# Patient Record
Sex: FEMALE | URBAN - NONMETROPOLITAN AREA
[De-identification: names, ages, dates, MRNs, and addresses within clinical notes are randomized per-mention and may not be internally consistent; named-entity substitution may affect disease eponyms.]

---

## 2017-03-22 ENCOUNTER — NURSE TRIAGE (OUTPATIENT)
Dept: ADMINISTRATIVE | Age: 62
End: 2017-03-22

## 2023-11-14 ENCOUNTER — TELEPHONE (OUTPATIENT)
Dept: NEUROSURGERY | Age: 68
End: 2023-11-14

## 2024-01-12 ENCOUNTER — HOSPITAL ENCOUNTER (OUTPATIENT)
Dept: CT IMAGING | Age: 69
Discharge: HOME | End: 2024-01-12
Attending: RADIOLOGY

## 2024-01-12 ENCOUNTER — HOSPITAL ENCOUNTER (OUTPATIENT)
Dept: MRI IMAGING | Age: 69
Discharge: HOME | End: 2024-01-12

## 2024-01-12 DIAGNOSIS — Z00.6 EXAMINATION FOR NORMAL COMPARISON FOR CLINICAL RESEARCH: ICD-10-CM

## 2024-01-15 ENCOUNTER — OFFICE VISIT (OUTPATIENT)
Dept: NEUROSURGERY | Age: 69
End: 2024-01-15
Payer: COMMERCIAL

## 2024-01-15 VITALS
DIASTOLIC BLOOD PRESSURE: 70 MMHG | SYSTOLIC BLOOD PRESSURE: 116 MMHG | WEIGHT: 107.2 LBS | HEIGHT: 65 IN | BODY MASS INDEX: 17.86 KG/M2

## 2024-01-15 DIAGNOSIS — Z98.1 ADJACENT SEGMENT DISEASE OF LUMBAR SPINE WITH HISTORY OF FUSION PROCEDURE: Primary | ICD-10-CM

## 2024-01-15 DIAGNOSIS — M51.36 ADJACENT SEGMENT DISEASE OF LUMBAR SPINE WITH HISTORY OF FUSION PROCEDURE: Primary | ICD-10-CM

## 2024-01-15 DIAGNOSIS — M51.26 LUMBAR DISC HERNIATION: ICD-10-CM

## 2024-01-15 DIAGNOSIS — M43.17 SPONDYLOLISTHESIS OF LUMBOSACRAL REGION: ICD-10-CM

## 2024-01-15 PROCEDURE — 99204 OFFICE O/P NEW MOD 45 MIN: CPT | Performed by: PHYSICIAN ASSISTANT

## 2024-01-15 PROCEDURE — 1123F ACP DISCUSS/DSCN MKR DOCD: CPT | Performed by: PHYSICIAN ASSISTANT

## 2024-01-15 RX ORDER — GABAPENTIN 300 MG/1
900 CAPSULE ORAL NIGHTLY
COMMUNITY
Start: 2021-05-06

## 2024-01-15 RX ORDER — ROPINIROLE 0.25 MG/1
TABLET, FILM COATED ORAL
COMMUNITY
Start: 2021-09-16

## 2024-01-15 RX ORDER — ASCORBIC ACID 500 MG
500 TABLET ORAL DAILY
COMMUNITY

## 2024-01-15 RX ORDER — FOLIC ACID 1 MG/1
1 TABLET ORAL DAILY
COMMUNITY

## 2024-01-15 RX ORDER — VITAMIN B COMPLEX
1 CAPSULE ORAL DAILY
COMMUNITY

## 2024-01-15 RX ORDER — DIPHENOXYLATE HYDROCHLORIDE AND ATROPINE SULFATE 2.5; .025 MG/1; MG/1
1 TABLET ORAL DAILY
COMMUNITY

## 2024-01-15 ASSESSMENT — ENCOUNTER SYMPTOMS
BACK PAIN: 1
SHORTNESS OF BREATH: 0
APNEA: 0
CHEST TIGHTNESS: 0

## 2024-01-15 NOTE — PROGRESS NOTES
[facility] NEUROSURGERY DEPARTMENT  [address] [place] [address]  [address]  [place] [id number]  Dept: [phone]  Dept Fax: [phone]      Patient Name:  Alpa Marte  Visit Date:  1/15/2024    HPI:     Ms. Marte is a 68 y.o. female that presents today at [facility] for evaluation of the following: A referral for service for evaluation of symptoms consistent with lumbar spinal stenosis    Chief Complaint   Patient presents with    New Patient     Lumbar stenosis         HPI   Mrs. Marte is a pleasant, active 68-year-old female, an every day smoker tobacco and 1/2 pack day history who admits to regular alcohol use and has a medical history significant for restless leg syndrome and urinary incontinence with a surgical history that includes prior lumbar fusions in 1989 and again in 2003 performed at [facility] resulting in pedicle screw and garima instrumentation at lumbar 4 spanning to S1.  She presents today unaccompanied and ambulating without assistance with complaints of chronic worsening low back pain radiating primarily to the left lower extremity all the way to the foot including chronic weakness and chronic partial left foot drop.  She does not take any medication other than over-the-counter medications and treats with ice and heat and rest.  In the past she has been treated by pain specialist who provided some injection therapies (greater than 20 years prior) that provided only transient relief.  She is not currently being seen or evaluated by pain specialist and is not currently undergoing any formal physical therapy.  She does relate that the first attempt fusion, in her words, did not take necessitating a follow-up procedure.  The procedures were performed by 2 different surgeons, neither of which does she follow with currently.       Medications:    Current Outpatient Medications:     gabapentin (NEURONTIN) 300 MG capsule, Take 3 capsules by

## 2024-03-11 ENCOUNTER — HOSPITAL ENCOUNTER (OUTPATIENT)
Dept: GENERAL RADIOLOGY | Age: 69
Discharge: HOME | End: 2024-03-11
Attending: RADIOLOGY

## 2024-03-11 ENCOUNTER — TELEPHONE (OUTPATIENT)
Dept: NEUROSURGERY | Age: 69
End: 2024-03-11

## 2024-03-11 ENCOUNTER — HOSPITAL ENCOUNTER (OUTPATIENT)
Dept: CT IMAGING | Age: 69
Discharge: HOME | End: 2024-03-11
Attending: RADIOLOGY

## 2024-03-11 ENCOUNTER — OFFICE VISIT (OUTPATIENT)
Dept: NEUROSURGERY | Age: 69
End: 2024-03-11
Payer: COMMERCIAL

## 2024-03-11 VITALS
SYSTOLIC BLOOD PRESSURE: 115 MMHG | WEIGHT: 107.14 LBS | BODY MASS INDEX: 17.85 KG/M2 | HEART RATE: 77 BPM | DIASTOLIC BLOOD PRESSURE: 71 MMHG | HEIGHT: 65 IN

## 2024-03-11 DIAGNOSIS — Z01.818 PRE-OP TESTING: ICD-10-CM

## 2024-03-11 DIAGNOSIS — R52 PAIN: ICD-10-CM

## 2024-03-11 DIAGNOSIS — Z98.1 ADJACENT SEGMENT DISEASE OF LUMBAR SPINE WITH HISTORY OF FUSION PROCEDURE: Primary | ICD-10-CM

## 2024-03-11 DIAGNOSIS — M51.36 ADJACENT SEGMENT DISEASE OF LUMBAR SPINE WITH HISTORY OF FUSION PROCEDURE: Primary | ICD-10-CM

## 2024-03-11 DIAGNOSIS — M51.26 LUMBAR DISC HERNIATION: ICD-10-CM

## 2024-03-11 DIAGNOSIS — M43.17 SPONDYLOLISTHESIS OF LUMBOSACRAL REGION: ICD-10-CM

## 2024-03-11 PROCEDURE — 99215 OFFICE O/P EST HI 40 MIN: CPT | Performed by: PHYSICIAN ASSISTANT

## 2024-03-11 PROCEDURE — 1123F ACP DISCUSS/DSCN MKR DOCD: CPT | Performed by: PHYSICIAN ASSISTANT

## 2024-03-11 RX ORDER — DULOXETIN HYDROCHLORIDE 20 MG/1
20 CAPSULE, DELAYED RELEASE ORAL DAILY
COMMUNITY
Start: 2024-02-20

## 2024-03-11 NOTE — TELEPHONE ENCOUNTER
SURGERY SCHEDULING FORM   [address] [address]      Phone *[phone] *[phone]   Surgical Scheduling Direct Line Phone *[phone] Fax *[phone]      Alpa Marte   1955   female    [address]  [place] [address]              Home Phone: [phone]    Cell Phone:    Telephone Information:   Mobile [phone]          Surgeon: Verenice   Surgery Date: 3/26/24  Time: 10:00 am    Procedure: Reexploration and revision of lumbar instrumented fusion for extension of the decompression and instrumentation to L3-Pelvis plus possible interbody implants     Diagnosis: Adjacent segment disease of lumbar spine with history of fusion procedure     Important Medical History:       Special Inst/Equip: Position: Prone, roxy table, microscope, neuropsyology, fluoro, neuronavigation, cell saver     Anesthesia:  Gen            Admission Type:  inpatient     Case Location:      Main OR         Need Preop Cardiac Clearance:       Does Patient have Cardiologist/physician?         Surgery Confirmation #: __________________________________________________    : ________________________   Date: __________________________     Insurance Company Name: [employer], Medicaid [place]  CPT: [id number]

## 2024-03-19 DIAGNOSIS — Z98.1 ADJACENT SEGMENT DISEASE OF LUMBAR SPINE WITH HISTORY OF FUSION PROCEDURE: Primary | ICD-10-CM

## 2024-03-19 DIAGNOSIS — M51.26 LUMBAR DISC HERNIATION: ICD-10-CM

## 2024-03-19 DIAGNOSIS — M43.17 SPONDYLOLISTHESIS OF LUMBOSACRAL REGION: ICD-10-CM

## 2024-03-19 DIAGNOSIS — M51.36 ADJACENT SEGMENT DISEASE OF LUMBAR SPINE WITH HISTORY OF FUSION PROCEDURE: Primary | ICD-10-CM

## 2024-03-19 DIAGNOSIS — Z01.818 PRE-OP TESTING: ICD-10-CM

## 2024-03-19 NOTE — PROGRESS NOTES
PAT Call Date:  3/19   Surgery Date: 3/26    Surgeon: Verenice   Surgery: revision of lumbar fusion    Any Isolation Precautions? No      Type of Isolation Precaution: Not Applicable   Is patient from a nursing home? No  Name of Nursing Home:   Any equipment assist needed for moving patient? No   Type of Equipment: Not Applicable  Patient last weight: 107 lb     Hard Copy on Chart  In EPIC Pending/Notes   Consent -   Within 30 days; signed, dated & timed by patient and physician     [] On Arrival     [] Blood      [] DNR   H&P -   Within 30 days  3/11 Santino  [] Physician To Do     Clearance -      []Medical     []Cardiac     [] Pulmonary    Orders -   Signed and Dated      [] Physician To Do    Labs -   Within 3 months  Doing pre op testing [facility] ORDERED  [x] CBC    [x] BMP   [x] GFR   [x] INR    [x] PTT    [] Urine    [] Liver Enzymes    [x] MRSA Nasal      Others:     Radiology Studies -   Within 1 year  ORDERED    2/21/24  [x] Chest X-Ray   [] MRI    [x] CT LUMBAR   [] Vascular   [] US     Pulmonary -     [] DARIA   [] CPAP     Cardiac Workup -   Stress Test, Echo, Cath within 18 months  ORDERED  [x] EKG      [] Cath                 [] Stress Test                      [] Echo/FABIOLA   [] CABG   [] Holter Monitor      [] Pacemaker/ICD        Brand:        Where does patient have checked:         Last check:         Rep Notified:

## 2024-03-19 NOTE — PROGRESS NOTES
PAT call attempted, patient unavailable, left message to please call us back at your earliest convenience; [phone]    Reminded to get pre op testing done as well.    Send message to  asking if they wanted to include nasal Mrsa swab and urinalysis with her pre op testing.    10:19 AM  responded that urine not needed but will order nasal Mrsa, labs [facility]

## 2024-03-20 NOTE — FLOWSHEET NOTE
Pt is having pain in  back that is .  It is 7/10 today.      Pain scale and pain management review with patient.     Follow all instructions given by your physician  Do not eat or drink anything after midnight prior to surgery(includes water, chewing gum, mints and ice chips)  Sips of water am of surgery with allowed medications  Do not use chewing tobacco after midnight prior to surgery  May brush teeth do not swallow water  Do not smoke, drink alcoholic beverages or use any illicit drugs for 24 hours prior to surgery  Bring insurance info and photo ID  Bring pertinent paperwork with you from Doctor or surgeons's office  Wear clean comfortable, loose-fitting clothing  No make-up, nail polish, jewelry, piercings, or contact lenses to be worn day of surgery  No glue on dentures morning of surgery; you will be asked to remove them for surgery. Case for glasses.  Shower the night before and the morning of surgery with cleansing soap provided or a liquid antibacterial soap, dry with new fresh clean towel after each shower, no lotions, creams or powder.  Clean sheets and pillowcase on bed night before surgery  Bring medications in original bottles, Bring rescue inhalers with you  Bring CPAP/BIPAP machine if you have one ( you may be charged if one is needed in recovery room ) no  CPAP machine  no   Pacemaker no    Our pharmacy has a Meds to Beds program where they will deliver any new prescriptions you may have to your room before you leave. Our Pharmacy will clear it through your insurance; for example (same co pay). This enables you to take your new RX as soon as you need when you get home and avoids stop/wait delays on the way home.  Please have a form of payment with you and have someone designated as your Pharmacy contact with their phone # as you may not feel well or still be under the influence of anesthesia.    Please refer to the SSI-Surgical Site Infection Flyer you hopefully received in the mail-together we

## 2024-03-25 ENCOUNTER — ANESTHESIA EVENT (OUTPATIENT)
Dept: OPERATING ROOM | Age: 69
End: 2024-03-25
Payer: MEDICARE

## 2024-03-25 NOTE — PROGRESS NOTES
Patient was seen and examined in the pre op area in conjunction with neurosurgery PA (Santino Stewart PA-C and Akilah Handley PA-C).      This is a 68-year-old female with long history of a progressive severe low back pain.  Patient underwent 2 lumbar spine surgeries in the past (that included lumbar spine instrumentation and fusion) the last 1 was in 2003.  Currently patient has severe low back pain that is up to 10/10.  The pain increases with standing and activity.  The pain goes to to both legs especially the left leg all the way down to her toes.  The pain is associated with numbness and tingling.  The pain is associated with weakness in her left leg.  Patient patient underwent several conservative treatment modalities including lumbar spine injections but without significant help.  Today neurological  exam was significant for anatlgic gait, spine postural abnormality,  resterction in spine movements,  bilateral positive Wood'stest. Tenderness over SI joints.  Patient also has general weakness in her left leg about 4/5 especially of the dorsiflexion of her foot (patient stated she has this weakness prolonged time and since her previous lumbar spine surgeries).       Patient underwent lumbar spine imaging studies that were:     1- Previous to lumbar spine instrumentation and fusion from L4-S1 (L5  screws were skipped in the previous surgery seems because of difficulty of placing pedical screws because of the degree of deformity and the spondylolisthesis).  2-lumbar spine disc disease at the level of L3-L4.  3-bilateral foraminal stenosis at the level of L3-L4.  4-adjustment segment disease at the level L3-L4.  5-high degree spondylolisthesis at the level of L4-L5.  6- degenerative change involving the sacroiliac joints                    Decision making:        Based on the patient's symptoms, her medical history, the finding her neurological exam and the findings of pateints spine imaging studies, I would

## 2024-03-25 NOTE — H&P
Alpa Marte is an 68 y.o.  female, an every day smoker tobacco and 1/2 pack day history who admits to regular alcohol use and has a medical history significant for restless leg syndrome and urinary incontinence with a surgical history that includes prior lumbar fusions in 1989 and again in 2003 performed at [facility] resulting in pedicle screw and garima instrumentation at lumbar 4 spanning to S1.  She presents today unaccompanied and ambulating without assistance with complaints of chronic worsening low back pain radiating primarily to the left lower extremity all the way to the foot including chronic weakness and chronic partial left foot drop.  She does not take any medication other than over-the-counter medications and treats with ice and heat and rest.  In the past she has been treated by pain specialist who provided some injection therapies (greater than 20 years prior) that provided only transient relief. A CT scan imaged on 2/21/2024 that reflect pedicle screws from L4-S1 along with laminectomies and a grade 3 anterior listhesis of L5 on S1 unchanged. The flexion-extension x-ray also shows the grade 3 to grade 4 anterior listhesis of L5 on S1 with no evidence of additional change in flexion or extension. SI joint imaging reveals some narrowing along with sclerosis for SI joints bilaterally.     Past Medical History:   Diagnosis Date    Arthritis     back    Restless leg syndrome        Allergies: No Known Allergies    Active Problems:    * No active hospital problems. *  Resolved Problems:    * No resolved hospital problems. *    Height 1.651 m (5' 5\"), weight 47.6 kg (105 lb).    Review of Systems    Constitutional:  Negative for activity change.   Respiratory:  Negative for apnea, chest tightness and shortness of breath.    Cardiovascular:  Negative for chest pain.   Musculoskeletal:  Positive for back pain and gait problem.   Neurological:  Positive for weakness and numbness. Negative  even death.  Understanding the risk associated with the procedure she is anxious and amicable to move forward with a consent offered for signature and the surgery scheduled at [facility] with Dr. Caldera/neurosurgeon on 3/26/2024.    Santino Stewart PA-C  3/25/2024

## 2024-03-26 ENCOUNTER — APPOINTMENT (OUTPATIENT)
Dept: GENERAL RADIOLOGY | Age: 69
End: 2024-03-26
Attending: NEUROLOGICAL SURGERY
Payer: MEDICARE

## 2024-03-26 ENCOUNTER — HOSPITAL ENCOUNTER (INPATIENT)
Age: 69
LOS: 24 days | Discharge: NURSING, REHAB OR LONG-TERM CARE | End: 2024-04-19
Attending: NEUROLOGICAL SURGERY | Admitting: INTERNAL MEDICINE
Payer: MEDICARE

## 2024-03-26 ENCOUNTER — ANESTHESIA (OUTPATIENT)
Dept: OPERATING ROOM | Age: 69
End: 2024-03-26
Payer: MEDICARE

## 2024-03-26 DIAGNOSIS — Z98.1 STATUS POST LUMBAR SPINAL FUSION: ICD-10-CM

## 2024-03-26 LAB
ABO: NORMAL
ANTIBODY SCREEN: NORMAL
RH FACTOR: NORMAL

## 2024-03-26 PROCEDURE — 0SG30K0 FUSION OF LUMBOSACRAL JOINT WITH NONAUTOLOGOUS TISSUE SUBSTITUTE, ANTERIOR APPROACH, ANTERIOR COLUMN, OPEN APPROACH: ICD-10-PCS | Performed by: NEUROLOGICAL SURGERY

## 2024-03-26 PROCEDURE — 7100000000 HC PACU RECOVERY - FIRST 15 MIN: Performed by: NEUROLOGICAL SURGERY

## 2024-03-26 PROCEDURE — 01NB0ZZ RELEASE LUMBAR NERVE, OPEN APPROACH: ICD-10-PCS | Performed by: PHYSICIAN ASSISTANT

## 2024-03-26 PROCEDURE — 3700000001 HC ADD 15 MINUTES (ANESTHESIA): Performed by: NEUROLOGICAL SURGERY

## 2024-03-26 PROCEDURE — 0QH104Z INSERTION OF INTERNAL FIXATION DEVICE INTO SACRUM, OPEN APPROACH: ICD-10-PCS | Performed by: PHYSICIAN ASSISTANT

## 2024-03-26 PROCEDURE — 6370000000 HC RX 637 (ALT 250 FOR IP): Performed by: NEUROLOGICAL SURGERY

## 2024-03-26 PROCEDURE — 3600000014 HC SURGERY LEVEL 4 ADDTL 15MIN: Performed by: NEUROLOGICAL SURGERY

## 2024-03-26 PROCEDURE — 0QH304Z INSERTION OF INTERNAL FIXATION DEVICE INTO LEFT PELVIC BONE, OPEN APPROACH: ICD-10-PCS | Performed by: PHYSICIAN ASSISTANT

## 2024-03-26 PROCEDURE — 2060000000 HC ICU INTERMEDIATE R&B

## 2024-03-26 PROCEDURE — 0QP004Z REMOVAL OF INTERNAL FIXATION DEVICE FROM LUMBAR VERTEBRA, OPEN APPROACH: ICD-10-PCS | Performed by: PHYSICIAN ASSISTANT

## 2024-03-26 PROCEDURE — 86850 RBC ANTIBODY SCREEN: CPT

## 2024-03-26 PROCEDURE — 63710 GRAFT REPAIR OF SPINE DEFECT: CPT | Performed by: PHYSICIAN ASSISTANT

## 2024-03-26 PROCEDURE — 86900 BLOOD TYPING SEROLOGIC ABO: CPT

## 2024-03-26 PROCEDURE — 3600000004 HC SURGERY LEVEL 4 BASE: Performed by: NEUROLOGICAL SURGERY

## 2024-03-26 PROCEDURE — 6360000002 HC RX W HCPCS: Performed by: PHYSICIAN ASSISTANT

## 2024-03-26 PROCEDURE — 0QH004Z INSERTION OF INTERNAL FIXATION DEVICE INTO LUMBAR VERTEBRA, OPEN APPROACH: ICD-10-PCS | Performed by: PHYSICIAN ASSISTANT

## 2024-03-26 PROCEDURE — 2720000010 HC SURG SUPPLY STERILE: Performed by: NEUROLOGICAL SURGERY

## 2024-03-26 PROCEDURE — 2580000003 HC RX 258: Performed by: NURSE ANESTHETIST, CERTIFIED REGISTERED

## 2024-03-26 PROCEDURE — 0QP104Z REMOVAL OF INTERNAL FIXATION DEVICE FROM SACRUM, OPEN APPROACH: ICD-10-PCS | Performed by: PHYSICIAN ASSISTANT

## 2024-03-26 PROCEDURE — 6360000002 HC RX W HCPCS: Performed by: NURSE ANESTHETIST, CERTIFIED REGISTERED

## 2024-03-26 PROCEDURE — 6360000002 HC RX W HCPCS: Performed by: NEUROLOGICAL SURGERY

## 2024-03-26 PROCEDURE — 22842 INSERT SPINE FIXATION DEVICE: CPT | Performed by: NEUROLOGICAL SURGERY

## 2024-03-26 PROCEDURE — 22842 INSERT SPINE FIXATION DEVICE: CPT | Performed by: PHYSICIAN ASSISTANT

## 2024-03-26 PROCEDURE — 0SG0071 FUSION OF LUMBAR VERTEBRAL JOINT WITH AUTOLOGOUS TISSUE SUBSTITUTE, POSTERIOR APPROACH, POSTERIOR COLUMN, OPEN APPROACH: ICD-10-PCS | Performed by: PHYSICIAN ASSISTANT

## 2024-03-26 PROCEDURE — 2500000003 HC RX 250 WO HCPCS: Performed by: NURSE ANESTHETIST, CERTIFIED REGISTERED

## 2024-03-26 PROCEDURE — 2580000003 HC RX 258: Performed by: PHYSICIAN ASSISTANT

## 2024-03-26 PROCEDURE — 36415 COLL VENOUS BLD VENIPUNCTURE: CPT

## 2024-03-26 PROCEDURE — 63710 GRAFT REPAIR OF SPINE DEFECT: CPT | Performed by: NEUROLOGICAL SURGERY

## 2024-03-26 PROCEDURE — C1713 ANCHOR/SCREW BN/BN,TIS/BN: HCPCS | Performed by: NEUROLOGICAL SURGERY

## 2024-03-26 PROCEDURE — 6360000002 HC RX W HCPCS

## 2024-03-26 PROCEDURE — 7100000001 HC PACU RECOVERY - ADDTL 15 MIN: Performed by: NEUROLOGICAL SURGERY

## 2024-03-26 PROCEDURE — 86901 BLOOD TYPING SEROLOGIC RH(D): CPT

## 2024-03-26 PROCEDURE — 6360000002 HC RX W HCPCS: Performed by: ANESTHESIOLOGY

## 2024-03-26 PROCEDURE — 2709999900 HC NON-CHARGEABLE SUPPLY: Performed by: NEUROLOGICAL SURGERY

## 2024-03-26 PROCEDURE — 61783 SCAN PROC SPINAL: CPT | Performed by: NEUROLOGICAL SURGERY

## 2024-03-26 PROCEDURE — 3700000000 HC ANESTHESIA ATTENDED CARE: Performed by: NEUROLOGICAL SURGERY

## 2024-03-26 PROCEDURE — 72100 X-RAY EXAM L-S SPINE 2/3 VWS: CPT

## 2024-03-26 PROCEDURE — 00UT07Z SUPPLEMENT SPINAL MENINGES WITH AUTOLOGOUS TISSUE SUBSTITUTE, OPEN APPROACH: ICD-10-PCS | Performed by: PHYSICIAN ASSISTANT

## 2024-03-26 PROCEDURE — 0SG10K0 FUSION OF 2 OR MORE LUMBAR VERTEBRAL JOINTS WITH NONAUTOLOGOUS TISSUE SUBSTITUTE, ANTERIOR APPROACH, ANTERIOR COLUMN, OPEN APPROACH: ICD-10-PCS | Performed by: NEUROLOGICAL SURGERY

## 2024-03-26 PROCEDURE — 63047 LAM FACETEC & FORAMOT LUMBAR: CPT | Performed by: PHYSICIAN ASSISTANT

## 2024-03-26 PROCEDURE — 63047 LAM FACETEC & FORAMOT LUMBAR: CPT | Performed by: NEUROLOGICAL SURGERY

## 2024-03-26 PROCEDURE — 0QH204Z INSERTION OF INTERNAL FIXATION DEVICE INTO RIGHT PELVIC BONE, OPEN APPROACH: ICD-10-PCS | Performed by: PHYSICIAN ASSISTANT

## 2024-03-26 DEVICE — POLYAXIAL SCREW
Type: IMPLANTABLE DEVICE | Site: SPINE LUMBAR | Status: FUNCTIONAL
Brand: XIA 3

## 2024-03-26 DEVICE — I-FACTOR™ PUTTY, 5.0 CC SYRINGE
Type: IMPLANTABLE DEVICE | Site: SPINE LUMBAR | Status: FUNCTIONAL
Brand: I-FACTOR™ PEPTIDE ENHANCED BONE GRAFT

## 2024-03-26 DEVICE — BIO DBM GEL
Type: IMPLANTABLE DEVICE | Site: SPINE LUMBAR | Status: FUNCTIONAL
Brand: BIO DBM

## 2024-03-26 DEVICE — FOAM STRIP
Type: IMPLANTABLE DEVICE | Site: SPINE LUMBAR | Status: FUNCTIONAL
Brand: VITOSS BIMODAL

## 2024-03-26 DEVICE — BLOCKER
Type: IMPLANTABLE DEVICE | Site: SPINE LUMBAR | Status: FUNCTIONAL
Brand: XIA 3

## 2024-03-26 DEVICE — POLYAXIAL SCREW
Type: IMPLANTABLE DEVICE | Site: SPINE LUMBAR | Status: FUNCTIONAL
Brand: XIA 3 SYSTEM - SERRATO

## 2024-03-26 DEVICE — TI ALLOY RAD ROD
Type: IMPLANTABLE DEVICE | Site: SPINE LUMBAR | Status: FUNCTIONAL
Brand: XIA 3

## 2024-03-26 DEVICE — STIMULAN® RAPID CURE PROVIDED STERILE FOR SINGLE PATIENT USE. STIMULAN® RAPID CURE CONTAINS CALCIUM SULFATE POWDER AND MIXING SOLUTION IN PRE-MEASURED QUANTITIES SO THAT WHEN MIXED TOGETHER IN A STERILE MIXING BOWL, THE RESULTANT PASTE IS TO BE DIGITALLY PACKED INTO OPEN BONE VOID/GAP TO SET INSITU OR PLACED INTO THE MOULD PROVIDED, THE MIXTURE SETS TO FORM BEADS. THE BIODEGRADABLE, RADIOPAQUE BEADS ARE RESORBED IN APPROXIMATELY 30 – 60 DAYS WHEN USED IN ACCORDANCE WITH THE DEVICE LABELLING. STIMULAN® RAPID CURE IS MANUFACTURED FROM SYNTHETIC IMPLANT GRADE CALCIUM SULFATE DIHYDRATE(CASO4.2H2O) THAT RESORBS AND IS REPLACED WITH BONE DURING THE HEALING PROCESS. ALSO, AS THE BONE VOID FILLER BEADS ARE BIODEGRADABLE AND BIOCOMPATIBLE, THEY MAY BE USED AT AN INFECTED SITE.
Type: IMPLANTABLE DEVICE | Site: SPINE LUMBAR | Status: FUNCTIONAL
Brand: STIMULAN® RAPID CURE

## 2024-03-26 RX ORDER — LABETALOL HYDROCHLORIDE 5 MG/ML
INJECTION INTRAVENOUS PRN
Status: DISCONTINUED | OUTPATIENT
Start: 2024-03-26 | End: 2024-03-26 | Stop reason: SDUPTHER

## 2024-03-26 RX ORDER — FENTANYL CITRATE 50 UG/ML
50 INJECTION, SOLUTION INTRAMUSCULAR; INTRAVENOUS EVERY 5 MIN PRN
Status: COMPLETED | OUTPATIENT
Start: 2024-03-26 | End: 2024-03-26

## 2024-03-26 RX ORDER — MORPHINE SULFATE 2 MG/ML
2 INJECTION, SOLUTION INTRAMUSCULAR; INTRAVENOUS
Status: DISCONTINUED | OUTPATIENT
Start: 2024-03-26 | End: 2024-04-02

## 2024-03-26 RX ORDER — ONDANSETRON 2 MG/ML
4 INJECTION INTRAMUSCULAR; INTRAVENOUS
Status: DISCONTINUED | OUTPATIENT
Start: 2024-03-26 | End: 2024-03-26 | Stop reason: HOSPADM

## 2024-03-26 RX ORDER — MORPHINE SULFATE 4 MG/ML
4 INJECTION, SOLUTION INTRAMUSCULAR; INTRAVENOUS
Status: DISCONTINUED | OUTPATIENT
Start: 2024-03-26 | End: 2024-04-02

## 2024-03-26 RX ORDER — HYDROMORPHONE HYDROCHLORIDE 2 MG/ML
INJECTION, SOLUTION INTRAMUSCULAR; INTRAVENOUS; SUBCUTANEOUS PRN
Status: DISCONTINUED | OUTPATIENT
Start: 2024-03-26 | End: 2024-03-26 | Stop reason: SDUPTHER

## 2024-03-26 RX ORDER — SODIUM CHLORIDE 0.9 % (FLUSH) 0.9 %
5-40 SYRINGE (ML) INJECTION EVERY 12 HOURS SCHEDULED
Status: DISCONTINUED | OUTPATIENT
Start: 2024-03-26 | End: 2024-03-26 | Stop reason: HOSPADM

## 2024-03-26 RX ORDER — LABETALOL HYDROCHLORIDE 5 MG/ML
10 INJECTION, SOLUTION INTRAVENOUS
Status: DISCONTINUED | OUTPATIENT
Start: 2024-03-26 | End: 2024-03-26 | Stop reason: HOSPADM

## 2024-03-26 RX ORDER — DULOXETIN HYDROCHLORIDE 20 MG/1
20 CAPSULE, DELAYED RELEASE ORAL NIGHTLY
Status: DISCONTINUED | OUTPATIENT
Start: 2024-03-26 | End: 2024-04-03

## 2024-03-26 RX ORDER — ROPINIROLE 1 MG/1
1 TABLET, FILM COATED ORAL NIGHTLY
Status: DISCONTINUED | OUTPATIENT
Start: 2024-03-26 | End: 2024-04-03

## 2024-03-26 RX ORDER — SODIUM CHLORIDE 9 MG/ML
INJECTION, SOLUTION INTRAVENOUS PRN
Status: DISCONTINUED | OUTPATIENT
Start: 2024-03-26 | End: 2024-03-26 | Stop reason: HOSPADM

## 2024-03-26 RX ORDER — GABAPENTIN 300 MG/1
900 CAPSULE ORAL NIGHTLY
Status: DISCONTINUED | OUTPATIENT
Start: 2024-03-27 | End: 2024-03-27

## 2024-03-26 RX ORDER — GINSENG 100 MG
CAPSULE ORAL PRN
Status: DISCONTINUED | OUTPATIENT
Start: 2024-03-26 | End: 2024-03-26 | Stop reason: ALTCHOICE

## 2024-03-26 RX ORDER — HYDRALAZINE HYDROCHLORIDE 20 MG/ML
10 INJECTION INTRAMUSCULAR; INTRAVENOUS
Status: DISCONTINUED | OUTPATIENT
Start: 2024-03-26 | End: 2024-03-26 | Stop reason: HOSPADM

## 2024-03-26 RX ORDER — NALOXONE HYDROCHLORIDE 0.4 MG/ML
INJECTION, SOLUTION INTRAMUSCULAR; INTRAVENOUS; SUBCUTANEOUS PRN
Status: DISCONTINUED | OUTPATIENT
Start: 2024-03-26 | End: 2024-03-26 | Stop reason: HOSPADM

## 2024-03-26 RX ORDER — SODIUM CHLORIDE 0.9 % (FLUSH) 0.9 %
5-40 SYRINGE (ML) INJECTION PRN
Status: DISCONTINUED | OUTPATIENT
Start: 2024-03-26 | End: 2024-03-26 | Stop reason: HOSPADM

## 2024-03-26 RX ORDER — SODIUM CHLORIDE 9 MG/ML
INJECTION, SOLUTION INTRAVENOUS PRN
Status: DISCONTINUED | OUTPATIENT
Start: 2024-03-26 | End: 2024-04-19 | Stop reason: HOSPADM

## 2024-03-26 RX ORDER — ROCURONIUM BROMIDE 10 MG/ML
INJECTION, SOLUTION INTRAVENOUS PRN
Status: DISCONTINUED | OUTPATIENT
Start: 2024-03-26 | End: 2024-03-26 | Stop reason: SDUPTHER

## 2024-03-26 RX ORDER — GLYCOPYRROLATE 0.2 MG/ML
INJECTION INTRAMUSCULAR; INTRAVENOUS PRN
Status: DISCONTINUED | OUTPATIENT
Start: 2024-03-26 | End: 2024-03-26 | Stop reason: SDUPTHER

## 2024-03-26 RX ORDER — FENTANYL CITRATE 50 UG/ML
INJECTION, SOLUTION INTRAMUSCULAR; INTRAVENOUS PRN
Status: DISCONTINUED | OUTPATIENT
Start: 2024-03-26 | End: 2024-03-26 | Stop reason: SDUPTHER

## 2024-03-26 RX ORDER — EPHEDRINE SULFATE/0.9% NACL/PF 25 MG/5 ML
SYRINGE (ML) INTRAVENOUS PRN
Status: DISCONTINUED | OUTPATIENT
Start: 2024-03-26 | End: 2024-03-26 | Stop reason: SDUPTHER

## 2024-03-26 RX ORDER — DEXAMETHASONE SODIUM PHOSPHATE 10 MG/ML
INJECTION, EMULSION INTRAMUSCULAR; INTRAVENOUS PRN
Status: DISCONTINUED | OUTPATIENT
Start: 2024-03-26 | End: 2024-03-26 | Stop reason: SDUPTHER

## 2024-03-26 RX ORDER — DEXAMETHASONE SODIUM PHOSPHATE 4 MG/ML
4 INJECTION, SOLUTION INTRA-ARTICULAR; INTRALESIONAL; INTRAMUSCULAR; INTRAVENOUS; SOFT TISSUE EVERY 6 HOURS
Status: DISCONTINUED | OUTPATIENT
Start: 2024-03-26 | End: 2024-04-05

## 2024-03-26 RX ORDER — CYCLOBENZAPRINE HCL 10 MG
10 TABLET ORAL EVERY 12 HOURS PRN
Status: DISCONTINUED | OUTPATIENT
Start: 2024-03-26 | End: 2024-04-12

## 2024-03-26 RX ORDER — SODIUM CHLORIDE 0.9 % (FLUSH) 0.9 %
5-40 SYRINGE (ML) INJECTION PRN
Status: DISCONTINUED | OUTPATIENT
Start: 2024-03-26 | End: 2024-04-19 | Stop reason: HOSPADM

## 2024-03-26 RX ORDER — MIDAZOLAM HYDROCHLORIDE 1 MG/ML
INJECTION INTRAMUSCULAR; INTRAVENOUS PRN
Status: DISCONTINUED | OUTPATIENT
Start: 2024-03-26 | End: 2024-03-26 | Stop reason: SDUPTHER

## 2024-03-26 RX ORDER — VANCOMYCIN HYDROCHLORIDE 1 G/20ML
INJECTION, POWDER, LYOPHILIZED, FOR SOLUTION INTRAVENOUS PRN
Status: DISCONTINUED | OUTPATIENT
Start: 2024-03-26 | End: 2024-03-26 | Stop reason: ALTCHOICE

## 2024-03-26 RX ORDER — PHENYLEPHRINE HCL IN 0.9% NACL 1 MG/10 ML
SYRINGE (ML) INTRAVENOUS PRN
Status: DISCONTINUED | OUTPATIENT
Start: 2024-03-26 | End: 2024-03-26 | Stop reason: SDUPTHER

## 2024-03-26 RX ORDER — PROPOFOL 10 MG/ML
INJECTION, EMULSION INTRAVENOUS PRN
Status: DISCONTINUED | OUTPATIENT
Start: 2024-03-26 | End: 2024-03-26 | Stop reason: SDUPTHER

## 2024-03-26 RX ORDER — FENTANYL CITRATE 50 UG/ML
INJECTION, SOLUTION INTRAMUSCULAR; INTRAVENOUS
Status: COMPLETED
Start: 2024-03-26 | End: 2024-03-26

## 2024-03-26 RX ORDER — SODIUM CHLORIDE 0.9 % (FLUSH) 0.9 %
5-40 SYRINGE (ML) INJECTION EVERY 12 HOURS SCHEDULED
Status: DISCONTINUED | OUTPATIENT
Start: 2024-03-26 | End: 2024-04-19 | Stop reason: HOSPADM

## 2024-03-26 RX ORDER — HYDROCODONE BITARTRATE AND ACETAMINOPHEN 5; 325 MG/1; MG/1
1 TABLET ORAL EVERY 4 HOURS PRN
Status: DISCONTINUED | OUTPATIENT
Start: 2024-03-26 | End: 2024-04-03

## 2024-03-26 RX ORDER — LIDOCAINE HCL/PF 100 MG/5ML
SYRINGE (ML) INJECTION PRN
Status: DISCONTINUED | OUTPATIENT
Start: 2024-03-26 | End: 2024-03-26 | Stop reason: SDUPTHER

## 2024-03-26 RX ORDER — SODIUM CHLORIDE 9 MG/ML
INJECTION, SOLUTION INTRAVENOUS CONTINUOUS PRN
Status: DISCONTINUED | OUTPATIENT
Start: 2024-03-26 | End: 2024-03-26 | Stop reason: SDUPTHER

## 2024-03-26 RX ADMIN — EPHEDRINE SULFATE 10 MG: 5 INJECTION INTRAVENOUS at 14:01

## 2024-03-26 RX ADMIN — ROCURONIUM BROMIDE 40 MG: 10 INJECTION INTRAVENOUS at 13:17

## 2024-03-26 RX ADMIN — SODIUM CHLORIDE 50 ML/HR: 9 INJECTION, SOLUTION INTRAVENOUS at 09:47

## 2024-03-26 RX ADMIN — MIDAZOLAM 2 MG: 1 INJECTION INTRAMUSCULAR; INTRAVENOUS at 13:13

## 2024-03-26 RX ADMIN — SODIUM CHLORIDE: 9 INJECTION, SOLUTION INTRAVENOUS at 14:58

## 2024-03-26 RX ADMIN — FENTANYL CITRATE 50 MCG: 50 INJECTION INTRAMUSCULAR; INTRAVENOUS at 20:58

## 2024-03-26 RX ADMIN — Medication 80 MG: at 13:16

## 2024-03-26 RX ADMIN — SODIUM CHLORIDE: 9 INJECTION, SOLUTION INTRAVENOUS at 11:53

## 2024-03-26 RX ADMIN — HYDROMORPHONE HYDROCHLORIDE 0.2 MG: 2 INJECTION INTRAMUSCULAR; INTRAVENOUS; SUBCUTANEOUS at 20:39

## 2024-03-26 RX ADMIN — SODIUM CHLORIDE, PRESERVATIVE FREE 10 ML: 5 INJECTION INTRAVENOUS at 23:38

## 2024-03-26 RX ADMIN — GLYCOPYRROLATE 0.2 MG: 0.2 INJECTION INTRAMUSCULAR; INTRAVENOUS at 14:04

## 2024-03-26 RX ADMIN — ROCURONIUM BROMIDE 10 MG: 10 INJECTION INTRAVENOUS at 15:51

## 2024-03-26 RX ADMIN — EPHEDRINE SULFATE 10 MG: 5 INJECTION INTRAVENOUS at 14:04

## 2024-03-26 RX ADMIN — ROCURONIUM BROMIDE 25 MG: 10 INJECTION INTRAVENOUS at 14:51

## 2024-03-26 RX ADMIN — Medication 100 MCG: at 14:17

## 2024-03-26 RX ADMIN — DEXAMETHASONE SODIUM PHOSPHATE 10 MG: 10 INJECTION, EMULSION INTRAMUSCULAR; INTRAVENOUS at 13:49

## 2024-03-26 RX ADMIN — Medication 100 MCG: at 14:10

## 2024-03-26 RX ADMIN — HYDROMORPHONE HYDROCHLORIDE 0.5 MG: 2 INJECTION INTRAMUSCULAR; INTRAVENOUS; SUBCUTANEOUS at 14:56

## 2024-03-26 RX ADMIN — FENTANYL CITRATE 50 MCG: 50 INJECTION INTRAMUSCULAR; INTRAVENOUS at 21:03

## 2024-03-26 RX ADMIN — MORPHINE SULFATE 4 MG: 4 INJECTION, SOLUTION INTRAMUSCULAR; INTRAVENOUS at 23:33

## 2024-03-26 RX ADMIN — HYDROMORPHONE HYDROCHLORIDE 0.2 MG: 2 INJECTION INTRAMUSCULAR; INTRAVENOUS; SUBCUTANEOUS at 19:16

## 2024-03-26 RX ADMIN — ROCURONIUM BROMIDE 10 MG: 10 INJECTION INTRAVENOUS at 13:56

## 2024-03-26 RX ADMIN — ROCURONIUM BROMIDE 20 MG: 10 INJECTION INTRAVENOUS at 17:41

## 2024-03-26 RX ADMIN — HYDROMORPHONE HYDROCHLORIDE 0.2 MG: 2 INJECTION INTRAMUSCULAR; INTRAVENOUS; SUBCUTANEOUS at 19:44

## 2024-03-26 RX ADMIN — SUGAMMADEX 200 MG: 100 INJECTION, SOLUTION INTRAVENOUS at 20:13

## 2024-03-26 RX ADMIN — SODIUM CHLORIDE: 9 INJECTION, SOLUTION INTRAVENOUS at 13:54

## 2024-03-26 RX ADMIN — HYDROMORPHONE HYDROCHLORIDE 0.5 MG: 2 INJECTION INTRAMUSCULAR; INTRAVENOUS; SUBCUTANEOUS at 15:28

## 2024-03-26 RX ADMIN — FENTANYL CITRATE 100 MCG: 50 INJECTION INTRAMUSCULAR; INTRAVENOUS at 13:58

## 2024-03-26 RX ADMIN — FENTANYL CITRATE 50 MCG: 50 INJECTION INTRAMUSCULAR; INTRAVENOUS at 14:40

## 2024-03-26 RX ADMIN — HYDROMORPHONE HYDROCHLORIDE 0.2 MG: 2 INJECTION INTRAMUSCULAR; INTRAVENOUS; SUBCUTANEOUS at 20:31

## 2024-03-26 RX ADMIN — FENTANYL CITRATE 100 MCG: 50 INJECTION INTRAMUSCULAR; INTRAVENOUS at 13:14

## 2024-03-26 RX ADMIN — SODIUM CHLORIDE: 9 INJECTION, SOLUTION INTRAVENOUS at 19:42

## 2024-03-26 RX ADMIN — GLYCOPYRROLATE 0.1 MG: 0.2 INJECTION INTRAMUSCULAR; INTRAVENOUS at 19:01

## 2024-03-26 RX ADMIN — LABETALOL HYDROCHLORIDE 5 MG: 5 INJECTION INTRAVENOUS at 15:58

## 2024-03-26 RX ADMIN — PROPOFOL 150 MG: 10 INJECTION, EMULSION INTRAVENOUS at 13:17

## 2024-03-26 RX ADMIN — HYDROMORPHONE HYDROCHLORIDE 0.5 MG: 1 INJECTION, SOLUTION INTRAMUSCULAR; INTRAVENOUS; SUBCUTANEOUS at 20:48

## 2024-03-26 RX ADMIN — HYDROMORPHONE HYDROCHLORIDE 0.5 MG: 1 INJECTION, SOLUTION INTRAMUSCULAR; INTRAVENOUS; SUBCUTANEOUS at 20:53

## 2024-03-26 RX ADMIN — HYDROMORPHONE HYDROCHLORIDE 0.2 MG: 2 INJECTION INTRAMUSCULAR; INTRAVENOUS; SUBCUTANEOUS at 19:24

## 2024-03-26 ASSESSMENT — PAIN SCALES - GENERAL
PAINLEVEL_OUTOF10: 10
PAINLEVEL_OUTOF10: 10
PAINLEVEL_OUTOF10: 0
PAINLEVEL_OUTOF10: 10

## 2024-03-26 ASSESSMENT — LIFESTYLE VARIABLES: SMOKING_STATUS: 1

## 2024-03-26 ASSESSMENT — PAIN DESCRIPTION - LOCATION
LOCATION: BACK

## 2024-03-26 ASSESSMENT — PAIN DESCRIPTION - ORIENTATION: ORIENTATION: LOWER

## 2024-03-26 ASSESSMENT — PAIN - FUNCTIONAL ASSESSMENT: PAIN_FUNCTIONAL_ASSESSMENT: 0-10

## 2024-03-26 NOTE — ANESTHESIA PRE PROCEDURE
Department of Anesthesiology  Preprocedure Note       Name:  Alpa Marte   Age:  68 y.o.  :  1955                                          MRN:  [id number]         Date:  3/26/2024      Surgeon: Surgeon(s):  Charlie Caldera MD    Procedure: Procedure(s):  Reexploration and Revision of Lumbar Instrumented Fusion for Extension of the Decompression and Instrumentation to L3-Pelvis Plus Poss Interbody Implants    Medications prior to admission:   Prior to Admission medications    Medication Sig Start Date End Date Taking? Authorizing Provider   DULoxetine (CYMBALTA) 20 MG extended release capsule Take 1 capsule by mouth at bedtime 24   Ruba Kerns MD   gabapentin (NEURONTIN) 300 MG capsule Take 3 capsules by mouth nightly. 21   Ruba Kerns MD   rOPINIRole (REQUIP) 0.25 MG tablet TAKE 1 TABLET BY MOUTH ONCE DAILY AT BEDTIME FOR 30 DAYS 21   Ruba Kerns MD   Multiple Vitamin (MULTI-VITAMINS) TABS Take 1 tablet by mouth daily    Ruba Kerns MD   folic acid (FOLVITE) 1 MG tablet Take 1 tablet by mouth daily    Ruba Kerns MD   vitamin D (CHOLECALCIFEROL) 125 MCG (5000 UT) CAPS capsule Take 1 capsule by mouth daily    Ruba Kerns MD   B Complex CAPS Take 1 capsule by mouth daily    Ruba Kerns MD   ascorbic acid (VITAMIN C) 500 MG tablet Take 1 tablet by mouth daily    Ruba Kerns MD       Current medications:    Current Facility-Administered Medications   Medication Dose Route Frequency Provider Last Rate Last Admin   • sodium chloride flush 0.9 % injection 5-40 mL  5-40 mL IntraVENous 2 times per day Santino Stewart PA-C       • sodium chloride flush 0.9 % injection 5-40 mL  5-40 mL IntraVENous PRN Santino Stewart PA-C       • 0.9 % sodium chloride infusion   IntraVENous PRN Santino Stewart PA-C 50 mL/hr at 24 0947 50 mL/hr at 24 0947   • ceFAZolin (ANCEF) 2,000 mg in sterile water 20 mL IV syringe

## 2024-03-26 NOTE — ANESTHESIA PROCEDURE NOTES
Arterial Line:    An arterial line was placed using surface landmarks, in the OR for the following indication(s): continuous blood pressure monitoring and blood sampling needed.    A 20 gauge (size) (length), Arrow (type) catheter was placed, Seldinger technique used, into the right radial artery, secured by tape and Tegaderm.  Anesthesia type: General    Events:  patient tolerated procedure well with no complications.  Anesthesiologist: Glynn Harry MD  Performed: Anesthesiologist   Preanesthetic Checklist  Completed: patient identified, IV checked, site marked, risks and benefits discussed, surgical/procedural consents, equipment checked, pre-op evaluation, timeout performed, anesthesia consent given, oxygen available, monitors applied/VS acknowledged, fire risk safety assessment completed and verbalized and blood product R/B/A discussed and consented

## 2024-03-27 PROBLEM — E44.0 MODERATE MALNUTRITION (HCC): Status: ACTIVE | Noted: 2024-03-27

## 2024-03-27 LAB
ALBUMIN SERPL BCG-MCNC: 4 G/DL (ref 3.5–5.1)
ALP SERPL-CCNC: 75 U/L (ref 38–126)
ALT SERPL W/O P-5'-P-CCNC: 44 U/L (ref 11–66)
ANION GAP SERPL CALC-SCNC: 16 MEQ/L (ref 8–16)
AST SERPL-CCNC: 39 U/L (ref 5–40)
BASOPHILS ABSOLUTE: 0 THOU/MM3 (ref 0–0.1)
BASOPHILS NFR BLD AUTO: 0.2 %
BILIRUB SERPL-MCNC: 0.5 MG/DL (ref 0.3–1.2)
BUN SERPL-MCNC: 17 MG/DL (ref 7–22)
CALCIUM SERPL-MCNC: 8.1 MG/DL (ref 8.5–10.5)
CHLORIDE SERPL-SCNC: 105 MEQ/L (ref 98–111)
CO2 SERPL-SCNC: 16 MEQ/L (ref 23–33)
CREAT SERPL-MCNC: 0.7 MG/DL (ref 0.4–1.2)
DEPRECATED RDW RBC AUTO: 47.8 FL (ref 35–45)
EOSINOPHIL NFR BLD AUTO: 1.8 %
EOSINOPHILS ABSOLUTE: 0.4 THOU/MM3 (ref 0–0.4)
ERYTHROCYTE [DISTWIDTH] IN BLOOD BY AUTOMATED COUNT: 12.9 % (ref 11.5–14.5)
GFR SERPL CREATININE-BSD FRML MDRD: > 90 ML/MIN/1.73M2
GLUCOSE SERPL-MCNC: 246 MG/DL (ref 70–108)
HCT VFR BLD AUTO: 37.4 % (ref 37–47)
HGB BLD-MCNC: 12.5 GM/DL (ref 12–16)
IMM GRANULOCYTES # BLD AUTO: 0.11 THOU/MM3 (ref 0–0.07)
IMM GRANULOCYTES NFR BLD AUTO: 0.5 %
LYMPHOCYTES ABSOLUTE: 0.9 THOU/MM3 (ref 1–4.8)
LYMPHOCYTES NFR BLD AUTO: 4.1 %
MCH RBC QN AUTO: 33.5 PG (ref 26–33)
MCHC RBC AUTO-ENTMCNC: 33.4 GM/DL (ref 32.2–35.5)
MCV RBC AUTO: 100.3 FL (ref 81–99)
MONOCYTES ABSOLUTE: 0.9 THOU/MM3 (ref 0.4–1.3)
MONOCYTES NFR BLD AUTO: 4 %
NEUTROPHILS NFR BLD AUTO: 89.4 %
NRBC BLD AUTO-RTO: 0 /100 WBC
PLATELET # BLD AUTO: 223 THOU/MM3 (ref 130–400)
PMV BLD AUTO: 11.5 FL (ref 9.4–12.4)
POTASSIUM SERPL-SCNC: 4.2 MEQ/L (ref 3.5–5.2)
PROT SERPL-MCNC: 6.6 G/DL (ref 6.1–8)
RBC # BLD AUTO: 3.73 MILL/MM3 (ref 4.2–5.4)
SEGMENTED NEUTROPHILS ABSOLUTE COUNT: 19.2 THOU/MM3 (ref 1.8–7.7)
SODIUM SERPL-SCNC: 137 MEQ/L (ref 135–145)
WBC # BLD AUTO: 21.5 THOU/MM3 (ref 4.8–10.8)

## 2024-03-27 PROCEDURE — 85025 COMPLETE CBC W/AUTO DIFF WBC: CPT

## 2024-03-27 PROCEDURE — APPSS30 APP SPLIT SHARED TIME 16-30 MINUTES

## 2024-03-27 PROCEDURE — 99223 1ST HOSP IP/OBS HIGH 75: CPT | Performed by: INTERNAL MEDICINE

## 2024-03-27 PROCEDURE — 2580000003 HC RX 258: Performed by: PHYSICIAN ASSISTANT

## 2024-03-27 PROCEDURE — 1200000003 HC TELEMETRY R&B

## 2024-03-27 PROCEDURE — 6370000000 HC RX 637 (ALT 250 FOR IP): Performed by: PHYSICIAN ASSISTANT

## 2024-03-27 PROCEDURE — 6360000002 HC RX W HCPCS: Performed by: NURSE PRACTITIONER

## 2024-03-27 PROCEDURE — 6360000002 HC RX W HCPCS: Performed by: PHYSICIAN ASSISTANT

## 2024-03-27 PROCEDURE — 80053 COMPREHEN METABOLIC PANEL: CPT

## 2024-03-27 PROCEDURE — 6370000000 HC RX 637 (ALT 250 FOR IP): Performed by: NURSE PRACTITIONER

## 2024-03-27 PROCEDURE — 36415 COLL VENOUS BLD VENIPUNCTURE: CPT

## 2024-03-27 RX ORDER — LABETALOL HYDROCHLORIDE 5 MG/ML
5 INJECTION, SOLUTION INTRAVENOUS EVERY 4 HOURS PRN
Status: DISCONTINUED | OUTPATIENT
Start: 2024-03-27 | End: 2024-04-19 | Stop reason: HOSPADM

## 2024-03-27 RX ORDER — ONDANSETRON 2 MG/ML
4 INJECTION INTRAMUSCULAR; INTRAVENOUS EVERY 6 HOURS PRN
Status: DISCONTINUED | OUTPATIENT
Start: 2024-03-27 | End: 2024-04-19 | Stop reason: HOSPADM

## 2024-03-27 RX ORDER — FAMOTIDINE 20 MG/1
20 TABLET, FILM COATED ORAL 2 TIMES DAILY
Status: DISCONTINUED | OUTPATIENT
Start: 2024-03-27 | End: 2024-04-03 | Stop reason: ALTCHOICE

## 2024-03-27 RX ORDER — GABAPENTIN 300 MG/1
900 CAPSULE ORAL NIGHTLY
Status: DISCONTINUED | OUTPATIENT
Start: 2024-03-27 | End: 2024-04-19 | Stop reason: HOSPADM

## 2024-03-27 RX ORDER — GABAPENTIN 300 MG/1
600 CAPSULE ORAL EVERY MORNING
Status: DISCONTINUED | OUTPATIENT
Start: 2024-03-27 | End: 2024-04-19 | Stop reason: HOSPADM

## 2024-03-27 RX ADMIN — DEXAMETHASONE SODIUM PHOSPHATE 4 MG: 4 INJECTION, SOLUTION INTRA-ARTICULAR; INTRALESIONAL; INTRAMUSCULAR; INTRAVENOUS; SOFT TISSUE at 05:15

## 2024-03-27 RX ADMIN — WATER 2000 MG: 1 INJECTION INTRAMUSCULAR; INTRAVENOUS; SUBCUTANEOUS at 01:43

## 2024-03-27 RX ADMIN — DULOXETINE HYDROCHLORIDE 20 MG: 20 CAPSULE, DELAYED RELEASE ORAL at 21:01

## 2024-03-27 RX ADMIN — SODIUM CHLORIDE, PRESERVATIVE FREE 10 ML: 5 INJECTION INTRAVENOUS at 21:01

## 2024-03-27 RX ADMIN — MORPHINE SULFATE 4 MG: 4 INJECTION, SOLUTION INTRAMUSCULAR; INTRAVENOUS at 09:08

## 2024-03-27 RX ADMIN — DEXAMETHASONE SODIUM PHOSPHATE 4 MG: 4 INJECTION, SOLUTION INTRA-ARTICULAR; INTRALESIONAL; INTRAMUSCULAR; INTRAVENOUS; SOFT TISSUE at 17:08

## 2024-03-27 RX ADMIN — GABAPENTIN 600 MG: 300 CAPSULE ORAL at 09:08

## 2024-03-27 RX ADMIN — ROPINIROLE HYDROCHLORIDE 1 MG: 1 TABLET, FILM COATED ORAL at 21:00

## 2024-03-27 RX ADMIN — WATER 2000 MG: 1 INJECTION INTRAMUSCULAR; INTRAVENOUS; SUBCUTANEOUS at 17:08

## 2024-03-27 RX ADMIN — DEXAMETHASONE SODIUM PHOSPHATE 4 MG: 4 INJECTION, SOLUTION INTRA-ARTICULAR; INTRALESIONAL; INTRAMUSCULAR; INTRAVENOUS; SOFT TISSUE at 13:10

## 2024-03-27 RX ADMIN — DEXAMETHASONE SODIUM PHOSPHATE 4 MG: 4 INJECTION, SOLUTION INTRA-ARTICULAR; INTRALESIONAL; INTRAMUSCULAR; INTRAVENOUS; SOFT TISSUE at 22:41

## 2024-03-27 RX ADMIN — DEXAMETHASONE SODIUM PHOSPHATE 4 MG: 4 INJECTION, SOLUTION INTRA-ARTICULAR; INTRALESIONAL; INTRAMUSCULAR; INTRAVENOUS; SOFT TISSUE at 00:12

## 2024-03-27 RX ADMIN — FAMOTIDINE 20 MG: 20 TABLET, FILM COATED ORAL at 21:01

## 2024-03-27 RX ADMIN — GABAPENTIN 900 MG: 300 CAPSULE ORAL at 21:00

## 2024-03-27 RX ADMIN — FAMOTIDINE 20 MG: 20 TABLET, FILM COATED ORAL at 09:08

## 2024-03-27 RX ADMIN — MORPHINE SULFATE 4 MG: 4 INJECTION, SOLUTION INTRAMUSCULAR; INTRAVENOUS at 03:38

## 2024-03-27 RX ADMIN — LABETALOL HYDROCHLORIDE 5 MG: 5 INJECTION, SOLUTION INTRAVENOUS at 01:57

## 2024-03-27 RX ADMIN — MORPHINE SULFATE 4 MG: 4 INJECTION, SOLUTION INTRAMUSCULAR; INTRAVENOUS at 06:26

## 2024-03-27 RX ADMIN — SODIUM CHLORIDE, PRESERVATIVE FREE 10 ML: 5 INJECTION INTRAVENOUS at 09:08

## 2024-03-27 RX ADMIN — CYCLOBENZAPRINE 10 MG: 10 TABLET, FILM COATED ORAL at 03:35

## 2024-03-27 RX ADMIN — ONDANSETRON 4 MG: 2 INJECTION INTRAMUSCULAR; INTRAVENOUS at 01:42

## 2024-03-27 RX ADMIN — WATER 2000 MG: 1 INJECTION INTRAMUSCULAR; INTRAVENOUS; SUBCUTANEOUS at 09:07

## 2024-03-27 RX ADMIN — MORPHINE SULFATE 2 MG: 2 INJECTION, SOLUTION INTRAMUSCULAR; INTRAVENOUS at 01:45

## 2024-03-27 ASSESSMENT — PAIN SCALES - GENERAL
PAINLEVEL_OUTOF10: 3
PAINLEVEL_OUTOF10: 3
PAINLEVEL_OUTOF10: 5
PAINLEVEL_OUTOF10: 10
PAINLEVEL_OUTOF10: 10
PAINLEVEL_OUTOF10: 8
PAINLEVEL_OUTOF10: 5
PAINLEVEL_OUTOF10: 0
PAINLEVEL_OUTOF10: 3

## 2024-03-27 ASSESSMENT — PAIN DESCRIPTION - FREQUENCY: FREQUENCY: CONTINUOUS

## 2024-03-27 ASSESSMENT — PAIN DESCRIPTION - LOCATION
LOCATION: BACK

## 2024-03-27 ASSESSMENT — PAIN DESCRIPTION - ORIENTATION: ORIENTATION: LOWER

## 2024-03-27 ASSESSMENT — PAIN - FUNCTIONAL ASSESSMENT: PAIN_FUNCTIONAL_ASSESSMENT: PREVENTS OR INTERFERES WITH MANY ACTIVE NOT PASSIVE ACTIVITIES

## 2024-03-27 ASSESSMENT — PAIN DESCRIPTION - PAIN TYPE: TYPE: CHRONIC PAIN

## 2024-03-27 ASSESSMENT — PAIN DESCRIPTION - ONSET: ONSET: ON-GOING

## 2024-03-27 ASSESSMENT — PAIN DESCRIPTION - DESCRIPTORS: DESCRIPTORS: ACHING;STABBING

## 2024-03-27 NOTE — CARE COORDINATION
Case Management Assessment  Initial Evaluation    Date/Time of Evaluation: 3/27/2024 12:03 PM  Assessment Completed by: Chata Plunkett RN    If patient is discharged prior to next notation, then this note serves as note for discharge by case management.    Patient Name: Alpa Marte                   YOB: 1955  Diagnosis: Adjacent segment disease of lumbar spine with history of fusion procedure [M51.36, Z98.1]  S/P lumbar fusion [Z98.1]                   Date / Time: 3/26/2024  7:58 AM  Location: [address] [place] [address]     Patient Admission Status: Inpatient   Readmission Risk Low 0-14, Mod 15-19), High > 20: Readmission Risk Score: 9.1    Current PCP: Connie Augustin PA  PCP verified by CM? Yes (updated in EPIC)    Chart Reviewed: Yes      History Provided by: Patient  Patient Orientation: Alert and Oriented    Patient Cognition: Alert    Hospitalization in the last 30 days (Readmission):  No    If yes, Readmission Assessment in CM Navigator will be completed.    Advance Directives:      Code Status: Full Code   Patient's Primary Decision Maker is: Legal Next of Kin (States she has HCPOA paperwork and her brother Ja Montoya is her HCPOA)      Discharge Planning:    Patient lives with: Alone Type of Home: Trailer/Mobile Home  Primary Care Giver: Self  Patient Support Systems include: Family Members, Friends/Neighbors   Current Financial resources: Medicare, Medicaid  Current community resources: None  Current services prior to admission: None            Current DME:              Type of Home Care services:  None    ADLS  Prior functional level: Independent in ADLs/IADLs  Current functional level: Other (see comment) (Therapy to eval)    Family can provide assistance at DC: No (States she has friends/neighbors that can help)  Would you like Case Management to discuss the discharge plan with any other family members/significant others, and if so, who? No  Plans to Return to Present Housing: Yes  Other  needs.     Transportation/Food Security/Housekeeping Addressed: No issues identified.     Chata Plunkett RN  Case Management Department

## 2024-03-27 NOTE — PLAN OF CARE
Problem: Discharge Planning  Goal: Discharge to home or other facility with appropriate resources  3/27/2024 1224 by Bonny Kennedy RN  Outcome: Progressing  Flowsheets (Taken 3/26/2024 2233 by Eloisa Aguirre, RN)  Discharge to home or other facility with appropriate resources:   Identify barriers to discharge with patient and caregiver   Arrange for needed discharge resources and transportation as appropriate   Identify discharge learning needs (meds, wound care, etc)     Problem: Pain  Goal: Verbalizes/displays adequate comfort level or baseline comfort level  3/27/2024 1224 by Bonny Kennedy RN  Outcome: Progressing     Problem: ABCDS Injury Assessment  Goal: Absence of physical injury  3/27/2024 1224 by Bonny Kennedy RN  Outcome: Progressing     Problem: Safety - Adult  Goal: Free from fall injury  3/27/2024 1224 by Bonny Kennedy RN  Outcome: Progressing    Care plan reviewed with patient.  Patient verbalizes understanding of the care plan and contributed to goal setting.

## 2024-03-27 NOTE — OP NOTE
[facility]  RECORD OF OPERATION    Patient name: Alpa Marte  Medical Record Number: [id number]  Account Number: [id number]      Date of Procedure: 3/26/2024      Pre-operative Diagnosis:       Previous to lumbar spine instrumentation and fusion from L4-S1 (L5  screws were skipped in the previous surgery seems because of difficulty of placing pedical screws because of the degree of deformity and the spondylolisthesis).  Lumbar spine disc disease at the level of L3-L4.  Bilateral foraminal stenosis at the level of L3-L4.  Adjustment segment disease at the level L3-L4.  high degree spondylolisthesis at the level of L4-L5.   degenerative change involving the sacroiliac joints  Lumbar spine deformity.  SI joint degenerative disease.  Progressive severe low back pain.  Progressive severe bilateral leg pain.  Failed back syndrome.  Postlaminectomy syndrome.  Left leg  leg weakness  chronic pain disorders.    Post-operative Diagnosis: Same    PROCEDURE:    1- Posterior lumbar spine decompression from L3-L4.  2-partial bilateral facetectomy at the level of L3-L4.  3-bilateral foraminotomy at the level of L3-L4.  4-micro surgical repair of 2 dural lacerations due too severe dural adhesions scar tissues utilizing muscle autograft and dura stitches.  5-reexploration and removal of previous lumbar spine instrumentation and fusion (from L4-S1).  8-new posterior instrumentation and fusion from L3 to the S1 as follow ( by utilizing [employer] posterior lumbar spine instrumentation system):    Placement of 5.5 x 50 mm pedicle screw at the level of L3 in both sides.  Placement of 6.5 x 50 mm pedicle screw at the level of L4 in both sides.  Placement of 6.5 x 50 mm pedicle screw at the level of L5 in both sides.   Placement of 7.5 x 35 mm pedicle screw at the level of S1 in both sides.   Placement of 8.5 x 80 mm screw at the level of S2/alar(pelvis) in both sides.  Placement of 120 mm rods in both sides.     9-  that required extra time for microsurgical repair.  Hence, modifier 22 needs to be applied.ered.     Charlie Caldera MD, MD  Electronically signed by me on 3/27/2024 at 8:30 AM

## 2024-03-27 NOTE — PROGRESS NOTES
2028: Pt arrives to pacu, unresponsive post anesthesia. Pt on simple mask with opa in place, respirations easy and unlabored. VSS  2031: Pt awakens on her own and opa removed. Pt groaning and moving in bed, dilaudid administered per crna  2039: Pt continues to move around in bed, moving all 4 extremities, dilaudid given per crna  2048: Pt screaming out and pain and yelling, 0.5mg of dilaudid given  2053: No change in patient, 0.5mg of dilaudid given  2058: Pt continues to yell and c/o pain, 50mcg of fentanyl given  2103: No change, 50mcg of fentanyl given  2105: Art line removed from right radial  2115: Pt sleeping at this time, awakens to voice and immediately drifts back to sleep  2125: Report given to Eloisa on 4B 2135: Pt transported to  in stable condition. VSS

## 2024-03-27 NOTE — PROGRESS NOTES
Neurosurgery Progress Note     Date: 3/27/2024   Patient:  Alpa Marte  YOB: 1955  Age: 68 y.o.  MRN: [id number]       Chief Complaint:   No chief complaint on file.    Subjective     HPI -  68 y.o. female who presented to [facility] for a planned surgical intervention. She presented with complaints of chronic worsening low back pain radiating primarily to the left lower extremity all the way to the foot including chronic weakness and chronic partial left foot drop.  She does not take any medication other than over-the-counter medications and treats with ice and heat and rest.  In the past she has been treated by pain specialist who provided some injection therapies (greater than 20 years prior) that provided only transient relief. A CT scan imaged on 2/21/2024 that reflect pedicle screws from L4-S1 along with laminectomies and a grade 3 anterior listhesis of L5 on S1 unchanged. The flexion-extension x-ray also shows the grade 3 to grade 4 anterior listhesis of L5 on S1 with no evidence of additional change in flexion or extension. SI joint imaging reveals some narrowing along with sclerosis for SI joints bilaterally.      Interval History -   3/27/24: POD #1 s/p lumbar fusion. Resting in bed. Notes her pain is 8/10 at the moment, localized to her back without radiation. She continues to feel week, specifically in her left leg. She also notes some numbness, which was present prior to surgery. Of note, patient was having some odd behaviors and was messing with her portable tele-monitor stating \"its hot in her, I'm trying to get it to zero\". She was redirectable.     Review of Systems   Review of Systems   Constitutional:  Negative for chills and fever.   Musculoskeletal:  Positive for back pain and gait problem.   Skin:  Positive for wound.   Neurological:  Positive for weakness and numbness.   Psychiatric/Behavioral:  Negative for agitation.       Medications     Current  Facility-Administered Medications:     famotidine (PEPCID) tablet 20 mg, 20 mg, Oral, BID, Kelly Lassiter APRN - CNP    ondansetron (ZOFRAN) injection 4 mg, 4 mg, IntraVENous, Q6H PRN, Kelly Lassiter APRN - CNP, 4 mg at 03/27/24 0142    labetalol (NORMODYNE;TRANDATE) injection 5 mg, 5 mg, IntraVENous, Q4H PRN, Kelly Lassiter APRN - CNP, 5 mg at 03/27/24 0157    gabapentin (NEURONTIN) capsule 900 mg, 900 mg, Oral, Nightly, Santino Stewart PA-C    rOPINIRole (REQUIP) tablet 0.25 mg, 0.25 mg, Oral, Nightly, Santino Stewart PA-C    DULoxetine (CYMBALTA) extended release capsule 20 mg, 20 mg, Oral, Nightly, Santino Stewart PA-C    sodium chloride flush 0.9 % injection 5-40 mL, 5-40 mL, IntraVENous, 2 times per day, Santino Stewart PA-C, 10 mL at 03/26/24 2338    sodium chloride flush 0.9 % injection 5-40 mL, 5-40 mL, IntraVENous, PRN, Santino Stewart PA-C    0.9 % sodium chloride infusion, , IntraVENous, PRN, Santino Stewart PA-C    morphine (PF) injection 2 mg, 2 mg, IntraVENous, Q2H PRN, 2 mg at 03/27/24 0145 **OR** morphine injection 4 mg, 4 mg, IntraVENous, Q2H PRN, Santino tSewart PA-C, 4 mg at 03/27/24 0626    HYDROcodone-acetaminophen (NORCO) 5-325 MG per tablet 1 tablet, 1 tablet, Oral, Q4H PRN, Santino Stewart PA-C    cyclobenzaprine (FLEXERIL) tablet 10 mg, 10 mg, Oral, Q12H PRN, Santino Stewart PA-C, 10 mg at 03/27/24 0335    dexAMETHasone (DECADRON) injection 4 mg, 4 mg, IntraVENous, Q6H, Santino Stewart PA-C, 4 mg at 03/27/24 0515    ceFAZolin (ANCEF) 2,000 mg in sterile water 20 mL IV syringe, 2,000 mg, IntraVENous, Q8H, Santino Stewart PA-C, 2,000 mg at 03/27/24 0143     Past History      Past Medical History:          Diagnosis Date    Arthritis     back    Restless leg syndrome        Past Surgical History:          Procedure Laterality Date    APPENDECTOMY  1978    [facility]    CHOLECYSTECTOMY  2006    [facility]     LUMBAR FUSION  1998    [facility]    LUMBAR FUSION  2003    [facility]    WRIST SURGERY Left 2004    [facility]       Family History:        Problem Relation Age of Onset    High Blood Pressure Mother     Diabetes Mother     Cancer Father     High Blood Pressure Brother     High Cholesterol Brother     Cancer Paternal Grandmother        Medications Prior to Admission:      Prior to Admission medications    Medication Sig Start Date End Date Taking? Authorizing Provider   DULoxetine (CYMBALTA) 20 MG extended release capsule Take 1 capsule by mouth at bedtime 2/20/24   Ruba Kerns MD   gabapentin (NEURONTIN) 300 MG capsule Take 2 capsules by mouth in the morning and 3 capsules by mouth in the evening. 5/6/21   Ruba Kerns MD   rOPINIRole (REQUIP) 1 MG tablet Take 1 tablet by mouth nightly 9/16/21   Ruba Kerns MD   Multiple Vitamin (MULTI-VITAMINS) TABS Take 1 tablet by mouth daily    Ruba Kerns MD   folic acid (FOLVITE) 1 MG tablet Take 1 tablet by mouth daily    Ruba Kerns MD   vitamin D (CHOLECALCIFEROL) 125 MCG (5000 UT) CAPS capsule Take 1 capsule by mouth daily    Ruba Kerns MD   B Complex CAPS Take 1 capsule by mouth daily    Ruba Kerns MD   ascorbic acid (VITAMIN C) 500 MG tablet Take 1 tablet by mouth daily    Ruba Kerns MD       Allergies:  Patient has no known allergies.    Social History:    TOBACCO:   reports that she has been smoking cigarettes. She has never used smokeless tobacco.  ETOH:   reports current alcohol use.      Physical Examination      /87   Pulse 83   Temp 96.8 °F (36 °C) (Axillary)   Resp 18   Ht 1.651 m (5' 5\")   Wt 47 kg (103 lb 9.6 oz)   SpO2 99%   BMI 17.24 kg/m²   I/O last 3 completed shifts:  In: 3161 [I.V.:3000; Blood:161]  Out: 1985 [Urine:1145; Drains:440; Blood:400]  No intake/output data recorded.    Physical Exam   General: Lying flat in bed.   Eyes:

## 2024-03-27 NOTE — PROGRESS NOTES
Pharmacy Medication History Note      List of current medications patient is taking is complete.    Source of information: patient, dispense history    Changes made to medication list:  Medications removed (include reason, ex. therapy complete or physician discontinued):  None     Medications added/doses adjusted:  Adjust gabapentin to 2 capsules in the morning and 3 capsules in the evening  Adjust dose of ropinirole from 0.25 mg to 1 mg at bedtime    Other notes (ex. Recent course of antibiotics, Coumadin dosing):  Patient reports last taking her medications the night of 3/25/24  Patient had a pill bottle of the duloxetine 20 mg daily; could not find the rest  Denies use of other OTC or herbal medications.    Allergies reviewed    Electronically signed by Janneth Awad on 3/27/2024 at 7:57 AM     Gonzalez MattsonD   Pharmacy Resident  3/27/2024 8:41 AM

## 2024-03-27 NOTE — ANESTHESIA POSTPROCEDURE EVALUATION
Department of Anesthesiology  Postprocedure Note    Patient: Alpa Marte  MRN: [id number]  YOB: 1955  Date of evaluation: 3/27/2024    Procedure Summary       Date: 03/26/24 Room / Location: [facility] OR  / [facility] OR    Anesthesia Start: 1311 Anesthesia Stop: 2040    Procedure: Reexploration and Revision of Lumbar Instrumented Fusion for Extension of the Decompression and Instrumentation to L3-Pelvis (Head) Diagnosis:       Adjacent segment disease of lumbar spine with history of fusion procedure      (Adjacent segment disease of lumbar spine with history of fusion procedure [M51.36, Z98.1])    Surgeons: Charlie Caldera MD Responsible Provider: David Christy DO    Anesthesia Type: general ASA Status: 3            Anesthesia Type: No value filed.    Katharina Phase I: Katharina Score: 8    Katharina Phase II:      Anesthesia Post Evaluation    Patient location during evaluation: ICU  Patient participation: complete - patient participated  Level of consciousness: responsive to verbal stimuli  Airway patency: patent  Nausea & Vomiting: no vomiting and no nausea  Cardiovascular status: hemodynamically stable  Respiratory status: spontaneous ventilation  Hydration status: stable  Pain management: adequate        No notable events documented.

## 2024-03-27 NOTE — CONSULTS
Department of Critical Care Medicine                  Consult Notes        Patient:  Alpa Marte    Unit/Bed:4B-07/007-A  YOB: 1955  MRN: [id number]   PCP: Marimar Gastelum APRN - CNP  Date of Admission: 3/26/2024  Chief Complaint: Post-Op state    Assessment and Plan:    Reexploration and revision of Lumbar instrumented fusion for extension of the decompression and instrumentation to L3-Pelvis, POD # 0: Secondary to chronic lumbar back pain. Pt had lumbar fusion in 1989 and again in 2003 done at [facility] resulting in pedicle screw and garima instrumentation at lumbar 4 spanning to S1.  ccVSS. No fever. Pain is relatively controlled. Hemodynamically stable. Neurologically intact. 1 closed wound HemoVac drainage system in placed with moderate amount of bloody output. Continue/Received Ancef for surgical prophylaxis. Continue Decadron. Incentive spirometry Q4H while awakeMonitor lab works in AM. Control pain. Remove anaya cath when appropriate. Neurosurgery team following. Follow up morning lab works.  Hypertension: Could be related to pain. SBP >180s. PRN Labetalol with SBP parameter. Control pain. Closely monitor BP trends.  Restless leg syndrome: Noted. Continue home dose Requip.  Tobacco abuse: Reported everyday 1/2 pack per day smoker tobacco. Tobacco cessation education, cessation advised.  Alcohol. Regularly user: Noted.  Arthritis: Noted.                                                                                                                                                                                                                                                                                                                    HISTORY OF PRESENT ILLNESS AND ICU COURSE:    From H&P:    Alpa Marte is a 68 y.o. female with past medical history of chronic lumbar back pain, restless leg syndrome, urinary incontinence, tobacco abuse, regular  tenderness.   GI:  -Abdomen is soft, round, non-distended, no significant tenderness. No masses or guarding. + BS in all quadrants.   :  -Lama catheter is present.  LYMPH:  -No palpable cervical lymphadenopathy and no hepatosplenomegaly. No petechiae or ecchymoses.  MS:  -B/L extremities strong muscles strength. Full movements. No gross joint deformities. No swelling, intact sensation symmetrical. 1 closed wound HemoVac drainage system with moderate amount of bloody output.  SKIN:  -Normal coloration, warm, dry. No open wounds or ulcers.  NEURO:  -CN 2-12 appear grossly intact, normal speech, no lateralizing weakness. No focal deficit. No seizures.  PSYC:  -Awake, alert, oriented x 4. Appropriate affect.     Data: (All radiographs, tracings, PFTs, and imaging are personally viewed and interpreted unless otherwise noted).   Telemetry: bedside monitor (03/27/24): shows SR-ST, rate 90s-100s    DIET: Diet NPO Exceptions are: Sips of Water with Meds   DVT prophylaxis: on SCDs  GI prophylaxis: on Famotidine  CODE STATUS: Full Code     Seen with multidisciplinary ICU team.  Meets Continued ICU Level Care Criteria:    [x] Yes   [] No - Transfer Planned to listed location:  [] HOSPITALIST CONTACTED-        Case and plan discussed with Dr. Brannon Castaneda.    Electronically signed by JOSE MANUEL Anderson - [facility]  CRITICAL CARE SPECIALIST  Patient seen by me including key components of medical care.  Case discussed with Np.  Estimated NP time is 30 minutes.  Case discussed with Dr. Caldera.  WBC 21.5, Hgb 12.5, Plt 223.  BUN 17, creatinine 0.7. Bicarb 16. AG 16.  Continue with IVF administration.  Trending acidosis.  Trending H/H..  Italicized font, if present,  represents changes to the note made by me.  CC time 35 minutes.  Time was discontiguous. Time does not include procedure. Time does include my direct assessment of the patient and coordination of care.  Time represents more than 50% of the time involved with patient care  by the CC team.  Electronically signed by Brannon Castaneda MD.

## 2024-03-27 NOTE — BRIEF OP NOTE
Brief Postoperative Note      Patient: Alpa Marte  YOB: 1955  MRN: [id number]    Date of Procedure: 3/26/2024    Pre-Op Diagnosis Codes:     * Adjacent segment disease of lumbar spine with history of fusion procedure [M51.36, Z98.1]    Post-Op Diagnosis: Same       Procedure(s):  Reexploration and Revision of Lumbar Instrumented Fusion for Extension of the Decompression and Instrumentation to L3-Pelvis    Surgeon(s):  Charlie Caldera MD    Assistant:  Physician Assistant: Santino Stewart PA-C    Anesthesia: General    Estimated Blood Loss (mL): 500    Complications: None    Specimens:   * No specimens in log *    Implants:  Implant Name Type Inv. Item Serial No.  Lot No. LRB No. Used Action   BLOCKER SPNL L50MM DIA6MM TI 1 LEV GILBERTO 3 - [id number]  BLOCKER SPNL L50MM DIA6MM TI 1 LEV GILBERTO 3  [employer] HOW-  N/A 10 Implanted   SCREW SPNL L50MM DIA5.5MM POLYAX ARAIZA - [id number]  SCREW SPNL L50MM DIA5.5MM POLYAX ARAIZA  [employer] HOW-WD  N/A 2 Implanted   SCREW SPNL L50MM DIA6.5MM POLYAX ARAIZA - [id number]  SCREW SPNL L50MM DIA6.5MM POLYAX ARAIZA  [employer] HOW-WD  N/A 4 Implanted   SCREW SPNL L35MM DIA7.5MM POLYAX ARAIZA - [id number]  SCREW SPNL L35MM DIA7.5MM POLYAX ARAIZA  [employer] HOW-WD  N/A 2 Implanted   SCREW SPNL L80MM OD85MM TI CANC POLYAX ST DAVID ANG - [id number]  SCREW SPNL L80MM OD85MM TI CANC POLYAX ST DAVID ANG  [employer] HOW-WD  N/A 2 Implanted   GRAFT BNE SUB 10CC BEAD 25CC CA SULPHATE RAP SET W/ INDIV - [id number]  GRAFT BNE SUB 10CC BEAD 25CC CA SULPHATE RAP SET W/ INDIV  [employer]WD [id number] N/A 1 Implanted   ALLOGRAFT BNE GEL 10 CC DBM BIO - [id number]  ALLOGRAFT BNE GEL 10 CC DBM BIO  [employer] HOW- [id number] N/A 1 Implanted   PUTTY BONE GRAFT FACTOR 5ML PEPTIDE ENHANCED W/SYRINGE - [id number]  PUTTY BONE GRAFT FACTOR 5ML PEPTIDE ENHANCED W/SYRINGE  [facility] [id number] N/A 1 Implanted   GRAFT BNE SUB 20CC  [id number] FOAM STRP COMPR RESIST FLX - [id number]  GRAFT BNE SUB 20CC [id number] FOAM STRP COMPR RESIST FLX  [employer] [facility] [id number] N/A 1 Implanted   MARITA SPNL L120MM DIA6MM ANT POST TI SMOOTH CRV GILBERTO III - [id number]  MARITA SPNL L120MM DIA6MM ANT POST TI SMOOTH CRV GILBERTO III  [employer] [facility]  N/A 2 Implanted         Drains: Hemovac  Closed/Suction Drain Left Back Accordion (Active)       Urinary Catheter 03/26/24 2 Way (Active)       Findings: Post reexploration and revision of lumbar instrumented fusion with extension of the decompression instrumentation to lumbar 3 into the pelvis      Electronically signed by Santino Stewart PA-C on 3/26/2024 at 8:21 PM

## 2024-03-27 NOTE — PROGRESS NOTES
[facility]  OCCUPATIONAL THERAPY MISSED TREATMENT NOTE  STRZ CVICU 4B  4B-07/007-A      Date: 3/27/2024  Patient Name: Alpa Marte        CSN: [id number]   : 1955  (68 y.o.)  Gender: female                REASON FOR MISSED TREATMENT:  Pt on bedrest today d/t dura leak. Will check back 3/28.

## 2024-03-27 NOTE — PROGRESS NOTES
[facility]  PHYSICAL THERAPY MISSED TREATMENT NOTE  STRZ CVICU 4B    Date: 3/27/2024  Patient Name: Alpa Marte        MRN: [id number]   : 1955  (68 y.o.)  Gender: female                REASON FOR MISSED TREATMENT:  Hold treatment per nursing request.  Pt is to be on bedrest today due to dura leak, will check back tomorrow.

## 2024-03-27 NOTE — PROGRESS NOTES
Comprehensive Nutrition Assessment    Type and Reason for Visit: Initial (RD assessment d/t low BMI)    Nutrition Recommendations/Plan:   Advance diet as medically appropriate.   Initiate Ensure Clear TID- switch to Ensure Plus when diet advances.   Recommend MVI and continue at discharge.      Malnutrition Assessment:  Malnutrition Status: Moderate malnutrition  Context: Chronic Illness       Findings of the 6 clinical characteristics of malnutrition:  Energy Intake:  75% or less estimated energy requirements for 1 month or longer  Weight Loss:  No significant weight loss (13.4% (16 lb) in the past 10 months)     Body Fat Loss:   (moderate fat losses to orbital, buccal, and triceps) Orbital, Triceps, Buccal region   Muscle Mass Loss:  Mild muscle mass loss Temples (temporalis), Clavicles (pectoralis & deltoids)  Fluid Accumulation:  No significant fluid accumulation     Strength:  Not Performed    Nutrition Assessment:    Pt. moderately malnourished AEB criteria as listed above.  At risk for further nutrition compromise r/t admitted d/t complaints of chronic worsening low back pain radiating primarily to the left lower extremity all the way to the foot including chronic weakness and chronic partial left foot drop and underlying medical condition (arthritis, tobacco use, alcohol use, restless leg syndrome, urinary incontinence).  Patient is POD #1 s/p lumbar fusion.      Nutrition Related Findings:    Patient/ Family Comments: Per patient her appetite is good. She denies any nausea/ vomiting/ diarrhea/ constipation. She has her top dentures here. She usually eats 3 small meals per day at home and she makes a protein powder shake every day.   Edema: none,per flowsheet  Wound: Surgical Incision (on back)     GI Function: LBM prior to admission per flowsheet    Labs:   Recent Labs     03/27/24  0326      K 4.2      GLUCOSE 246*   BUN 17   CREATININE 0.7     No components found for:

## 2024-03-27 NOTE — PROGRESS NOTES
Pt was in bed and was alone at the time of the visit. She was dealing with lumbar fusion and was very restless. She was encouraged   03/27/24 1210   Encounter Summary   Encounter Overview/Reason  Initial Encounter   Service Provided For: Patient   Referral/Consult From: Demetra   Last Encounter  03/27/24  (Anointed)   Complexity of Encounter Low   Begin Time 1020   End Time  1026   Total Time Calculated 6 min   Spiritual/Emotional needs   Type Spiritual Support   Rituals, Rites and Sacraments   Type Anointing   Assessment/Intervention/Outcome   Assessment Anxious   Intervention Empowerment      and anointed.

## 2024-03-27 NOTE — CARE COORDINATION
3/27/24, 1:42 PM EDT    DISCHARGE PLANNING EVALUATION     Order received to arrange HH post back surgery. Rosemary SAUCEDO suggested sw call pts brother to get a baseline on pt, Rosemary had a difficult time keeping pt focused on discharge planning questions.    LANCE called pts brother and left message to return my call.    PC received from pts brother Ja, he states pt is not like this at home, pt stays on task, is able to answer questions and was willing to go to [facility] prior to this surgery. Ja states pt was very active and independent pta to surgery. Pt did not use dme, does her own cooking, cleaning, personal care and drives to grocery store etc. Ja did mention he does not really know pts neighbors and if they are able to do daily dressing changes as pt had told Rosemary SAUCEDO. Ja states he is not able to help due to he still works and travels Monday thru Thursday.

## 2024-03-28 LAB — GLUCOSE BLD STRIP.AUTO-MCNC: 155 MG/DL (ref 70–108)

## 2024-03-28 PROCEDURE — 82948 REAGENT STRIP/BLOOD GLUCOSE: CPT

## 2024-03-28 PROCEDURE — 99222 1ST HOSP IP/OBS MODERATE 55: CPT | Performed by: NURSE PRACTITIONER

## 2024-03-28 PROCEDURE — 6360000002 HC RX W HCPCS: Performed by: PHYSICIAN ASSISTANT

## 2024-03-28 PROCEDURE — 2580000003 HC RX 258: Performed by: PHYSICIAN ASSISTANT

## 2024-03-28 PROCEDURE — 97110 THERAPEUTIC EXERCISES: CPT

## 2024-03-28 PROCEDURE — 97162 PT EVAL MOD COMPLEX 30 MIN: CPT

## 2024-03-28 PROCEDURE — 97530 THERAPEUTIC ACTIVITIES: CPT

## 2024-03-28 PROCEDURE — 99232 SBSQ HOSP IP/OBS MODERATE 35: CPT | Performed by: INTERNAL MEDICINE

## 2024-03-28 PROCEDURE — 1200000000 HC SEMI PRIVATE

## 2024-03-28 PROCEDURE — 6370000000 HC RX 637 (ALT 250 FOR IP): Performed by: PHYSICIAN ASSISTANT

## 2024-03-28 PROCEDURE — APPSS30 APP SPLIT SHARED TIME 16-30 MINUTES

## 2024-03-28 PROCEDURE — 6370000000 HC RX 637 (ALT 250 FOR IP): Performed by: NURSE PRACTITIONER

## 2024-03-28 PROCEDURE — 6360000002 HC RX W HCPCS: Performed by: NURSE PRACTITIONER

## 2024-03-28 RX ADMIN — LABETALOL HYDROCHLORIDE 5 MG: 5 INJECTION, SOLUTION INTRAVENOUS at 00:35

## 2024-03-28 RX ADMIN — GABAPENTIN 900 MG: 300 CAPSULE ORAL at 21:35

## 2024-03-28 RX ADMIN — MORPHINE SULFATE 4 MG: 4 INJECTION, SOLUTION INTRAMUSCULAR; INTRAVENOUS at 01:52

## 2024-03-28 RX ADMIN — WATER 2000 MG: 1 INJECTION INTRAMUSCULAR; INTRAVENOUS; SUBCUTANEOUS at 00:30

## 2024-03-28 RX ADMIN — SODIUM CHLORIDE, PRESERVATIVE FREE 10 ML: 5 INJECTION INTRAVENOUS at 07:38

## 2024-03-28 RX ADMIN — SODIUM CHLORIDE, PRESERVATIVE FREE 10 ML: 5 INJECTION INTRAVENOUS at 16:34

## 2024-03-28 RX ADMIN — ONDANSETRON 4 MG: 2 INJECTION INTRAMUSCULAR; INTRAVENOUS at 01:52

## 2024-03-28 RX ADMIN — CYCLOBENZAPRINE 10 MG: 10 TABLET, FILM COATED ORAL at 07:37

## 2024-03-28 RX ADMIN — DEXAMETHASONE SODIUM PHOSPHATE 4 MG: 4 INJECTION, SOLUTION INTRA-ARTICULAR; INTRALESIONAL; INTRAMUSCULAR; INTRAVENOUS; SOFT TISSUE at 04:09

## 2024-03-28 RX ADMIN — DEXAMETHASONE SODIUM PHOSPHATE 4 MG: 4 INJECTION, SOLUTION INTRA-ARTICULAR; INTRALESIONAL; INTRAMUSCULAR; INTRAVENOUS; SOFT TISSUE at 16:35

## 2024-03-28 RX ADMIN — MORPHINE SULFATE 2 MG: 2 INJECTION, SOLUTION INTRAMUSCULAR; INTRAVENOUS at 04:09

## 2024-03-28 RX ADMIN — SODIUM CHLORIDE, PRESERVATIVE FREE 10 ML: 5 INJECTION INTRAVENOUS at 22:27

## 2024-03-28 RX ADMIN — GABAPENTIN 600 MG: 300 CAPSULE ORAL at 07:38

## 2024-03-28 RX ADMIN — FAMOTIDINE 20 MG: 20 TABLET, FILM COATED ORAL at 07:37

## 2024-03-28 RX ADMIN — FAMOTIDINE 20 MG: 20 TABLET, FILM COATED ORAL at 21:36

## 2024-03-28 RX ADMIN — DULOXETINE HYDROCHLORIDE 20 MG: 20 CAPSULE, DELAYED RELEASE ORAL at 21:36

## 2024-03-28 RX ADMIN — ROPINIROLE HYDROCHLORIDE 1 MG: 1 TABLET, FILM COATED ORAL at 21:36

## 2024-03-28 RX ADMIN — WATER 2000 MG: 1 INJECTION INTRAMUSCULAR; INTRAVENOUS; SUBCUTANEOUS at 07:37

## 2024-03-28 RX ADMIN — DEXAMETHASONE SODIUM PHOSPHATE 4 MG: 4 INJECTION, SOLUTION INTRA-ARTICULAR; INTRALESIONAL; INTRAMUSCULAR; INTRAVENOUS; SOFT TISSUE at 11:18

## 2024-03-28 RX ADMIN — CYCLOBENZAPRINE 10 MG: 10 TABLET, FILM COATED ORAL at 21:36

## 2024-03-28 RX ADMIN — WATER 2000 MG: 1 INJECTION INTRAMUSCULAR; INTRAVENOUS; SUBCUTANEOUS at 16:34

## 2024-03-28 ASSESSMENT — PAIN SCALES - GENERAL
PAINLEVEL_OUTOF10: 5
PAINLEVEL_OUTOF10: 0
PAINLEVEL_OUTOF10: 10
PAINLEVEL_OUTOF10: 0
PAINLEVEL_OUTOF10: 4

## 2024-03-28 ASSESSMENT — PAIN SCALES - WONG BAKER
WONGBAKER_NUMERICALRESPONSE: NO HURT

## 2024-03-28 ASSESSMENT — PAIN DESCRIPTION - ONSET
ONSET: ON-GOING
ONSET: ON-GOING

## 2024-03-28 ASSESSMENT — PAIN DESCRIPTION - DESCRIPTORS
DESCRIPTORS: ACHING;STABBING
DESCRIPTORS: ACHING;STABBING
DESCRIPTORS: SPASM;TIGHTNESS

## 2024-03-28 ASSESSMENT — PAIN DESCRIPTION - LOCATION
LOCATION: BACK

## 2024-03-28 ASSESSMENT — PAIN DESCRIPTION - ORIENTATION
ORIENTATION: LOWER
ORIENTATION: LOWER

## 2024-03-28 ASSESSMENT — PAIN DESCRIPTION - FREQUENCY
FREQUENCY: CONTINUOUS
FREQUENCY: CONTINUOUS

## 2024-03-28 ASSESSMENT — PAIN DESCRIPTION - PAIN TYPE
TYPE: CHRONIC PAIN
TYPE: CHRONIC PAIN;SURGICAL PAIN

## 2024-03-28 ASSESSMENT — PAIN - FUNCTIONAL ASSESSMENT
PAIN_FUNCTIONAL_ASSESSMENT: PREVENTS OR INTERFERES WITH MANY ACTIVE NOT PASSIVE ACTIVITIES
PAIN_FUNCTIONAL_ASSESSMENT: PREVENTS OR INTERFERES WITH MANY ACTIVE NOT PASSIVE ACTIVITIES
PAIN_FUNCTIONAL_ASSESSMENT: PREVENTS OR INTERFERES SOME ACTIVE ACTIVITIES AND ADLS

## 2024-03-28 NOTE — PROGRESS NOTES
Department of Critical Care Medicine                  Consult Notes        Patient:  Alpa Marte    Unit/Bed:7K-04/004-A  YOB: 1955  MRN: [id number]   PCP: Connie Augustin PA  Date of Admission: 3/26/2024  Chief Complaint: Post-Op state    Assessment and Plan:    Reexploration and revision of Lumbar instrumented fusion for extension of the decompression and instrumentation to L3-Pelvis, POD # 2: Secondary to chronic lumbar back pain. Prior hx of lumbar fusions. HemoVac drainage system in placed with mild amount of bloody output. Continue/Received Ancef for surgical prophylaxis. Continue Decadron. Incentive spirometry Q4H. Control pain. Remove anaya cath when appropriate. Neurosurgery team following.   Acute leukocytosis: likely reactive in setting of post surgery. No fevers. Continue ancef and monitor vitals and CBC daily.   Restless leg syndrome: Noted. Continue home dose Requip.  Tobacco abuse: Reported everyday 1/2 pack per day smoker tobacco. Tobacco cessation education, cessation advised.  Alcohol. Regularly user: Noted.  Arthritis: Noted.                                                                                                                                                                                                                                                                                                                    HISTORY OF PRESENT ILLNESS AND ICU COURSE:    From H&P:    Alpa Marte is a 68 y.o. female with past medical history of chronic lumbar back pain, restless leg syndrome, urinary incontinence, tobacco abuse, regular alcohol use, and arthritis who came in to [facility] for her lumbar surgical intervention with neurosurgery, Dr. Caldera. Chart review reveals that patient had surgical history significant for lumbar fusions in 1989 and again in 2003 done at [facility] resulting in pedicle screw and garima instrumentation at lumbar 4 spanning  fever, and respiratory status stable on 2 L oxygen support via nasal cannula. No acute labored breathing noted on examination. Patient denied pain and discomfort. Patient confirmed full code status at this time, primary nurse at bedside.    3/28: no acute events over night. Pain controlled with PRN medication. No fevers. Will to transfer patient to medicine floors.     ROS: denied headache, SOB/chest pain, positive back pain, no weakness of extremities.     Past Medical History:  Past Surgery HX:  Patient  has a past surgical history that includes Appendectomy (1978); lumbar fusion (1998); lumbar fusion (2003); Cholecystectomy (2006); Wrist surgery (Left, 2004); and lumbar fusion (N/A, 3/26/2024).  Family HX: Assessed: family history includes Cancer in her father and paternal grandmother; Diabetes in her mother; High Blood Pressure in her brother and mother; High Cholesterol in her brother.  Social HX: tobacco uses         Scheduled Meds:   famotidine  20 mg Oral BID    gabapentin  600 mg Oral QAM    And    gabapentin  900 mg Oral Nightly    rOPINIRole  1 mg Oral Nightly    DULoxetine  20 mg Oral Nightly    sodium chloride flush  5-40 mL IntraVENous 2 times per day    dexAMETHasone  4 mg IntraVENous Q6H    ceFAZolin  2,000 mg IntraVENous Q8H         PHYSICAL EXAMINATION: 03/28/24    Vital Signs:  Temp: 98.2 RR: 16 HR: 99 BP: 130/73 Spo2: 98% room air I/O: 60/1685 net -1625     GCS:   15  Oxygen:    nasal canula at 2 L/min  General:   acutely ill appearing female   HEENT:  normocephalic and atraumatic.  No scleral icterus. PERR  Neck: supple.  No Thyromegaly.  Lungs: clear to auscultation.  No retractions  Cardiac: RRR.  No JVD.  Abdomen: soft.  Nontender.  Extremities:  No clubbing, cyanosis, or edema x 4.    Vasculature: capillary refill < 3 seconds. Palpable dorsalis pedis pulses.  MS: One closed wound HemoVac drainage system with moderate amount of bloody output.  Skin:  warm and dry.  Psych:  Alert and oriented

## 2024-03-28 NOTE — PROGRESS NOTES
Rounded on unit, introduced self to patient.  Primary RN present at time of visit.  Patient had difficulty staying on track with conversation.  Will follow patient clinical progress and therapy progress for rehab referral consideration.

## 2024-03-28 NOTE — CONSULTS
Physical Medicine & Rehabilitation   Consult Note      Admitting Physician: Brannon Castaneda MD    Primary Care Provider: Connie Augustin PA     Reason for Consult:  lumbar surgery with dura tear. Rehab needs    History of Present Illness:  Alpa Marte is a 68 y.o. female admitted to [facility] on 3/26/2024. Patient  has a past medical history of Arthritis and Restless leg syndrome.  Patient presented to [facility] for planned lumbar intervention. patient had lumbar fusion in 1989 and again in 2003 done at [facility] resulting in pedicle screw and garima instrumentation at lumbar 4 spanning to S1. Patient presented to Dr Caldera for recurring lumbar pain with radicular pain into the left leg. Patient noted left leg weakness and partial left foot drop. CT scan imaged on 2/21/2024 that reflect pedicle screws from L4-S1 along with laminectomies and a grade 3 anterior listhesis of L5 on S1 unchanged. The flexion-extension x-ray also shows the grade 3 to grade 4 anterior listhesis of L5 on S1 with no evidence of additional change in flexion or extension. SI joint imaging revealed narrowing along with sclerosis for SI joints bilaterally.  JHON lumbar spine indicated \"Previous lumbar spine fusion from L4 through S1.  Grade 2 spondylolisthesis with spondylolysis of L5 on S1.  Disc and osteophytes narrow the neural foramina throughout the lumbar region as discussed in detail above.  No significant stenosis of the thecal sac in the lumbar region. \"  Patient elected to move forward with lumbar revision. Patient underwent Reexploration and Revision of Lumbar Instrumented Fusion for Extension of the Decompression and Instrumentation to L3-Pelvis with Dr Caldera on 3/26/24. Patient with intra-op dura leak that was repaired. Patient was placed on bedrest POD #1. Patient participated with PT 3/28 but with complaints of throbbing neck pain with movement and trouble tolerating.     3/28/24: Patient seen today in     Problem Relation Age of Onset    High Blood Pressure Mother     Diabetes Mother     Cancer Father     High Blood Pressure Brother     High Cholesterol Brother     Cancer Paternal Grandmother        Review of Systems:  CONSTITUTIONAL:  positive for  fatigue  EYES:  negative for  double vision and blurred vision  HEENT:  negative  RESPIRATORY:  negative for  dry cough, cough with sputum, and dyspnea  CARDIOVASCULAR:  negative for  chest pain, dyspnea  GASTROINTESTINAL:  negative for constipation  GENITOURINARY:  negative  SKIN:  lumbar incision  HEMATOLOGIC/LYMPHATIC:  negative  MUSCULOSKELETAL:  positive for  pain  NEUROLOGICAL:  positive for memory problems, coordination problems, gait problems, weakness, pain, and occasional twitching  BEHAVIOR/PSYCH:  positive for increased sleep, poor concentration, and fatigue  System review otherwise negative    Physical Exam:  /79   Pulse 85   Temp 98.1 °F (36.7 °C) (Oral)   Resp 18   Ht 1.651 m (5' 5\")   Wt 58.3 kg (128 lb 8.5 oz)   SpO2 92%   BMI 21.39 kg/m²   awake  Orientation:   person, place, time, not date  Mood: within normal limits  Affect: calm  General appearance: Patient is well nourished, well developed, well groomed and in no acute distress, appearing older than stated age, thin    Memory:   abnormal - deficits noted,   Attention/Concentration: normal for majority, moments of inattention  Language:  normal    Cranial Nerves:  no obvious deficits noted  ROM:  normal  Motor Exam:  Motor exam is symmetrical 4 out of 5 all extremities bilaterally  Tone:  normal  Muscle bulk: within normal limits  Sensory:  Sensory intact    Heart: normal rate and regular rhythm, no shortness of breath, extremities well perfused  Lungs: no audible wheezing or crackles, no increased WOB  Abdomen: soft, non-tender and non-distended    Skin: warm and dry, no rash or erythema and lumbar incision with dry dressing in place and hemovac drain noted  Peripheral vascular:  narrowing of the neural foramina   bilaterally.  The thecal sac is not stenotic. There is mild facet and   ligamentum flavum hypertrophy.   L4-L5: The thecal sac is not stenotic The thecal sac and neural foramina are   intact.   L5-S1: The thecal sac is generous. Disc and/or osteophytes result in   narrowing of the neural foramina bilaterally.  Mild progression of the degenerative changes when compared to the prior study.   IMPRESSION:   Previous lumbar spine fusion from L4 through S1.  Grade 2 spondylolisthesis   with spondylolysis of L5 on S1.  Disc and osteophytes narrow the neural   foramina throughout the lumbar region as discussed in detail above.  No   significant stenosis of the thecal sac in the lumbar region.       Impression:  Grade 2 spondylolisthesis with spondylolysis of L5 on S1.  S/p Reexploration and revision of Lumbar instrumented fusion for extension of the decompression and instrumentation to L3-Pelvis 3/26/24    S/p bed rest 3/27 for dura leak repair intra-op  Hypertension  Restless leg syndrome  Tobacco abuse  Alcohol use  Arthritis    Recommendations:  Continue current therapies as tolerated  Patient with difficulty tolerating therapy this AM due to \"throbbing neck pain\" when up. OT held due to symptoms  EEG ordered for twitching and staring episodes. Twitching stops when patient resting with eyes closed. Patient verbalizes only drinking wine, not daily, and only a glass when she does drink. Unsure of this accuracy and discussed with RN about monitoring for DTs  Will follow for appropriateness for IPR. Patient will likely need slow initiation of therapy so appropriateness of 3 hours a day of therapy will need to be considered depending on when discharge is.  Patient will be a precert for next level of IP therapy. Discussed SNF vs IPR with patient.     It was my pleasure to evaluate Alpa Marte today.  Please call with questions.    Gaye Caballero, APRN - CNP

## 2024-03-28 NOTE — PROGRESS NOTES
[facility]  INPATIENT REHABILITATION  ADMISSIONS COORDINATOR CONSULT    Referral Type: internal    Patient Name: Alpa Marte      MRN: [id number]    : 1955  (68 y.o.)  Gender: female   Race:White (non-)     Payor Source: Payor: [employer] MEDICARE / Plan: [employer] CHOICE-PPO MEDICARE / Product Type: *No Product type* /   Secondary Payor Source:  MEDICAID [place]    Isolation Status: No active isolations    Lives With: Alone  Type of Home: Trailer  Home Layout: One level  Home Access: Stairs to enter with rails  Receives Help From: Friend(s)       Disciplines Required upon Admission to Inpatient Rehabilitation: Physical Therapy and Occupational Therapy  Post operative: Yes  Fall: No  Dialysis: No  Diet: ADULT ORAL NUTRITION SUPPLEMENT; Breakfast, Lunch, Dinner; Clear Liquid Oral Supplement  ADULT DIET; Easy to Chew  Discussed patient with  and PM&R provider: Await medical work up completion.  Patient requires precert for post acute care.

## 2024-03-28 NOTE — PLAN OF CARE
Problem: Discharge Planning  Goal: Discharge to home or other facility with appropriate resources  Outcome: Progressing  Flowsheets (Taken 3/28/2024 0315)  Discharge to home or other facility with appropriate resources:   Identify barriers to discharge with patient and caregiver   Arrange for needed discharge resources and transportation as appropriate   Identify discharge learning needs (meds, wound care, etc)   Arrange for interpreters to assist at discharge as needed   Refer to discharge planning if patient needs post-hospital services based on physician order or complex needs related to functional status, cognitive ability or social support system     Problem: Pain  Goal: Verbalizes/displays adequate comfort level or baseline comfort level  Outcome: Progressing  Flowsheets (Taken 3/28/2024 0315)  Verbalizes/displays adequate comfort level or baseline comfort level:   Encourage patient to monitor pain and request assistance   Assess pain using appropriate pain scale   Administer analgesics based on type and severity of pain and evaluate response   Implement non-pharmacological measures as appropriate and evaluate response   Consider cultural and social influences on pain and pain management   Notify Licensed Independent Practitioner if interventions unsuccessful or patient reports new pain     Problem: ABCDS Injury Assessment  Goal: Absence of physical injury  Outcome: Progressing  Flowsheets (Taken 3/28/2024 0315)  Absence of Physical Injury: Implement safety measures based on patient assessment     Problem: Safety - Adult  Goal: Free from fall injury  Outcome: Progressing  Flowsheets (Taken 3/28/2024 0315)  Free From Fall Injury: Instruct family/caregiver on patient safety     Problem: Nutrition Deficit:  Goal: Optimize nutritional status  Outcome: Progressing  Flowsheets (Taken 3/28/2024 0315)  Nutrient intake appropriate for improving, restoring, or maintaining nutritional needs:   Assess nutritional status

## 2024-03-28 NOTE — PLAN OF CARE
Problem: Discharge Planning  Goal: Discharge to home or other facility with appropriate resources  3/28/2024 1136 by Mishel Moreno RN  Outcome: Progressing  Flowsheets (Taken 3/28/2024 0315 by Ling Oconnor, RN)  Discharge to home or other facility with appropriate resources:   Identify barriers to discharge with patient and caregiver   Arrange for needed discharge resources and transportation as appropriate   Identify discharge learning needs (meds, wound care, etc)   Arrange for interpreters to assist at discharge as needed   Refer to discharge planning if patient needs post-hospital services based on physician order or complex needs related to functional status, cognitive ability or social support system     Problem: Pain  Goal: Verbalizes/displays adequate comfort level or baseline comfort level  3/28/2024 1136 by Mishel Moreno RN  Outcome: Progressing  Flowsheets (Taken 3/28/2024 0315 by Ling Oconnor, RN)  Verbalizes/displays adequate comfort level or baseline comfort level:   Encourage patient to monitor pain and request assistance   Assess pain using appropriate pain scale   Administer analgesics based on type and severity of pain and evaluate response   Implement non-pharmacological measures as appropriate and evaluate response   Consider cultural and social influences on pain and pain management   Notify Licensed Independent Practitioner if interventions unsuccessful or patient reports new pain     Problem: ABCDS Injury Assessment  Goal: Absence of physical injury  3/28/2024 1136 by Mishel Moreno RN  Outcome: Progressing  Flowsheets (Taken 3/28/2024 0315 by Ling Oconnor, RN)  Absence of Physical Injury: Implement safety measures based on patient assessment     Problem: Safety - Adult  Goal: Free from fall injury  3/28/2024 1136 by Mishel Moreno, RN  Outcome: Progressing  Flowsheets (Taken 3/28/2024 0315 by Ling Oconnor, RN)  Free From Fall Injury: Instruct family/caregiver on

## 2024-03-28 NOTE — PROGRESS NOTES
Patient ambulated to bathroom with standby assist,brace, gait belt and front wheeled walker. Required frequent verbal direction. Voided large amount. Denied headache while up. Returned to bed. Bed alarm on. Call light within reach.

## 2024-03-28 NOTE — PLAN OF CARE
Problem: Discharge Planning  Goal: Discharge to home or other facility with appropriate resources  3/28/2024 1158 by Mary Kate Hartley LSW  Outcome: Progressing       Consult received. Please see SW note dated 3/27.

## 2024-03-28 NOTE — PROGRESS NOTES
[facility]  INPATIENT PHYSICAL THERAPY  EVALUATION  [facility] CVICU 4B - 4B-07/007-A    Time In: 0904  Time Out: 0940  Timed Code Treatment Minutes: 25 Minutes  Minutes: 36          Date: 3/28/2024  Patient Name: Alpa Marte,  Gender:  female        MRN: [id number]  : 1955  (68 y.o.)      Referring Practitioner: Santino Stewart PA-C  Diagnosis: Adjacent segment disease of lumbar spine with history of fusion procedure  Additional Pertinent Hx: Per HPI, \"Alpa Marte is an 68 y.o.  female, an every day smoker tobacco and 1/2 pack day history who admits to regular alcohol use and has a medical history significant for restless leg syndrome and urinary incontinence with a surgical history that includes prior lumbar fusions in  and again in  performed at [facility] resulting in pedicle screw and garima instrumentation at lumbar 4 spanning to S1.  She presents today unaccompanied and ambulating without assistance with complaints of chronic worsening low back pain radiating primarily to the left lower extremity all the way to the foot including chronic weakness and chronic partial left foot drop.  She does not take any medication other than over-the-counter medications and treats with ice and heat and rest.  In the past she has been treated by pain specialist who provided some injection therapies (greater than 20 years prior) that provided only transient relief. A CT scan imaged on 2024 that reflect pedicle screws from L4-S1 along with laminectomies and a grade 3 anterior listhesis of L5 on S1 unchanged. The flexion-extension x-ray also shows the grade 3 to grade 4 anterior listhesis of L5 on S1 with no evidence of additional change in flexion or extension. SI joint imaging reveals some narrowing along with sclerosis for SI joints bilaterally.\"  Pt is now s/p Reexploration and Revision of Lumbar Instrumented Fusion for Extension of the Decompression and Instrumentation

## 2024-03-28 NOTE — PROGRESS NOTES
Neurosurgery Progress Note     Date: 3/28/2024   Patient:  Alpa Marte  YOB: 1955  Age: 68 y.o.  MRN: [id number]       Chief Complaint:   No chief complaint on file.    Subjective     HPI -  68 y.o. female who presented to [facility] for a planned surgical intervention. She presented with complaints of chronic worsening low back pain radiating primarily to the left lower extremity all the way to the foot including chronic weakness and chronic partial left foot drop.  She does not take any medication other than over-the-counter medications and treats with ice and heat and rest.  In the past she has been treated by pain specialist who provided some injection therapies (greater than 20 years prior) that provided only transient relief. A CT scan imaged on 2/21/2024 that reflect pedicle screws from L4-S1 along with laminectomies and a grade 3 anterior listhesis of L5 on S1 unchanged. The flexion-extension x-ray also shows the grade 3 to grade 4 anterior listhesis of L5 on S1 with no evidence of additional change in flexion or extension. SI joint imaging reveals some narrowing along with sclerosis for SI joints bilaterally.      Interval History -   3/27/24: POD #1 s/p lumbar fusion. Resting in bed. Notes her pain is 8/10 at the moment, localized to her back without radiation. She continues to feel week, specifically in her left leg. She also notes some numbness, which was present prior to surgery. Of note, patient was having some odd behaviors and was messing with her portable tele-monitor stating \"its hot in her, I'm trying to get it to zero\". She was redirectable.     3/28/24: POD #2. Per nursing patient had 2 episodes of emesis overnight and increased back pain when rolling to get cleaned up. Today patient reports she is feeling much better, her pain is well controlled. She denies any headaches.       Review of Systems   Review of Systems   Constitutional:  Negative for chills and fever.  MG tablet Take 1 tablet by mouth daily    Provider, MD Ruba       Allergies:  Patient has no known allergies.    Social History:    TOBACCO:   reports that she has been smoking cigarettes. She has never used smokeless tobacco.  ETOH:   reports current alcohol use.      Physical Examination      BP (!) 146/68   Pulse 65   Temp 98.1 °F (36.7 °C) (Oral)   Resp 18   Ht 1.651 m (5' 5\")   Wt 58.3 kg (128 lb 8.5 oz)   SpO2 94%   BMI 21.39 kg/m²   I/O last 3 completed shifts:  In: 1211 [P.O.:50; I.V.:1000; Blood:161]  Out: 3260 [Urine:2125; Drains:1085; Blood:50]  No intake/output data recorded.    Physical Exam   General: Lying flat in bed.   Eyes:  PER  HENT: Normocephalic, atraumatic. Oral mucosa moist. Hearing intact.   Neck: Trachea midline.  No gross JVD appreciated.  Respiratory:  Normal effort.   Musculoskeletal:  No abnormal movements.   Skin: Warm and dry. Surgical dressing was dry and flat. Underlying sutures were intact, dry gauze re-applied. drain functioning appropriately.   Neuro Exam: Awake, alert and oriented x 3.  Face is symmetric, speech is clear. 4/5 strength of bilateral plantarflexion/dorsiflexion and hip flexion (plantarflexion slightly weaker).     Labs/Imaging/Diagnostics     Recent Labs     03/27/24  0326   WBC 21.5*   HGB 12.5   HCT 37.4          Recent Labs     03/27/24  0326      K 4.2      CO2 16*   BUN 17   CREATININE 0.7   CALCIUM 8.1*       Recent Labs     03/27/24  0326   AST 39   ALT 44   BILITOT 0.5   ALKPHOS 75       No results for input(s): \"INR\" in the last 72 hours.  No results for input(s): \"CKTOTAL\", \"TROPONINI\" in the last 72 hours.    Assessment and Plan:        Active Hospital Problems    Diagnosis Date Noted    Moderate malnutrition (HCC) [E44.0] 03/27/2024    S/P lumbar fusion [Z98.1] 03/26/2024     PLAN:  - Medical management per critical care   - Maintain drain today, continue abx while drain is in place.   - Dressings changed to dry gauze

## 2024-03-28 NOTE — PROGRESS NOTES
[facility]  OCCUPATIONAL THERAPY MISSED TREATMENT NOTE  [facility] ORTHOPEDICS 7K  7K-04/004-A      Date: 3/28/2024  Patient Name: Alpa Marte        CSN: [id number]   : 1955  (68 y.o.)  Gender: female                REASON FOR MISSED TREATMENT:  Per PT, neurosx requesting to hold further therapy intervention this date as pt developed onset of head/neck pain with increased activity during PT session earlier. Will check back as able

## 2024-03-29 LAB
ANION GAP SERPL CALC-SCNC: 17 MEQ/L (ref 8–16)
BUN SERPL-MCNC: 21 MG/DL (ref 7–22)
CALCIUM SERPL-MCNC: 8.9 MG/DL (ref 8.5–10.5)
CHLORIDE SERPL-SCNC: 102 MEQ/L (ref 98–111)
CO2 SERPL-SCNC: 23 MEQ/L (ref 23–33)
CREAT SERPL-MCNC: 0.6 MG/DL (ref 0.4–1.2)
DEPRECATED RDW RBC AUTO: 46.5 FL (ref 35–45)
ERYTHROCYTE [DISTWIDTH] IN BLOOD BY AUTOMATED COUNT: 13.1 % (ref 11.5–14.5)
GFR SERPL CREATININE-BSD FRML MDRD: > 90 ML/MIN/1.73M2
GLUCOSE SERPL-MCNC: 134 MG/DL (ref 70–108)
HCT VFR BLD AUTO: 38.4 % (ref 37–47)
HGB BLD-MCNC: 13.4 GM/DL (ref 12–16)
MCH RBC QN AUTO: 33.5 PG (ref 26–33)
MCHC RBC AUTO-ENTMCNC: 34.9 GM/DL (ref 32.2–35.5)
MCV RBC AUTO: 96 FL (ref 81–99)
PLATELET # BLD AUTO: 216 THOU/MM3 (ref 130–400)
PMV BLD AUTO: 12.1 FL (ref 9.4–12.4)
POTASSIUM SERPL-SCNC: 3.5 MEQ/L (ref 3.5–5.2)
RBC # BLD AUTO: 4 MILL/MM3 (ref 4.2–5.4)
SODIUM SERPL-SCNC: 142 MEQ/L (ref 135–145)
WBC # BLD AUTO: 19.6 THOU/MM3 (ref 4.8–10.8)

## 2024-03-29 PROCEDURE — 95816 EEG AWAKE AND DROWSY: CPT | Performed by: PSYCHIATRY & NEUROLOGY

## 2024-03-29 PROCEDURE — 95819 EEG AWAKE AND ASLEEP: CPT

## 2024-03-29 PROCEDURE — 97166 OT EVAL MOD COMPLEX 45 MIN: CPT

## 2024-03-29 PROCEDURE — 80048 BASIC METABOLIC PNL TOTAL CA: CPT

## 2024-03-29 PROCEDURE — 99232 SBSQ HOSP IP/OBS MODERATE 35: CPT | Performed by: HOSPITALIST

## 2024-03-29 PROCEDURE — 6360000002 HC RX W HCPCS: Performed by: NURSE PRACTITIONER

## 2024-03-29 PROCEDURE — 99231 SBSQ HOSP IP/OBS SF/LOW 25: CPT | Performed by: NURSE PRACTITIONER

## 2024-03-29 PROCEDURE — 85027 COMPLETE CBC AUTOMATED: CPT

## 2024-03-29 PROCEDURE — 97530 THERAPEUTIC ACTIVITIES: CPT

## 2024-03-29 PROCEDURE — 1200000000 HC SEMI PRIVATE

## 2024-03-29 PROCEDURE — APPSS60 APP SPLIT SHARED TIME 46-60 MINUTES

## 2024-03-29 PROCEDURE — 36415 COLL VENOUS BLD VENIPUNCTURE: CPT

## 2024-03-29 PROCEDURE — 6370000000 HC RX 637 (ALT 250 FOR IP): Performed by: PHYSICIAN ASSISTANT

## 2024-03-29 PROCEDURE — 6370000000 HC RX 637 (ALT 250 FOR IP): Performed by: NURSE PRACTITIONER

## 2024-03-29 PROCEDURE — 2580000003 HC RX 258: Performed by: PHYSICIAN ASSISTANT

## 2024-03-29 PROCEDURE — 6360000002 HC RX W HCPCS: Performed by: PHYSICIAN ASSISTANT

## 2024-03-29 RX ADMIN — DEXAMETHASONE SODIUM PHOSPHATE 4 MG: 4 INJECTION, SOLUTION INTRA-ARTICULAR; INTRALESIONAL; INTRAMUSCULAR; INTRAVENOUS; SOFT TISSUE at 01:17

## 2024-03-29 RX ADMIN — DULOXETINE HYDROCHLORIDE 20 MG: 20 CAPSULE, DELAYED RELEASE ORAL at 19:59

## 2024-03-29 RX ADMIN — DEXAMETHASONE SODIUM PHOSPHATE 4 MG: 4 INJECTION, SOLUTION INTRA-ARTICULAR; INTRALESIONAL; INTRAMUSCULAR; INTRAVENOUS; SOFT TISSUE at 11:01

## 2024-03-29 RX ADMIN — FAMOTIDINE 20 MG: 20 TABLET, FILM COATED ORAL at 08:39

## 2024-03-29 RX ADMIN — DEXAMETHASONE SODIUM PHOSPHATE 4 MG: 4 INJECTION, SOLUTION INTRA-ARTICULAR; INTRALESIONAL; INTRAMUSCULAR; INTRAVENOUS; SOFT TISSUE at 17:29

## 2024-03-29 RX ADMIN — DEXAMETHASONE SODIUM PHOSPHATE 4 MG: 4 INJECTION, SOLUTION INTRA-ARTICULAR; INTRALESIONAL; INTRAMUSCULAR; INTRAVENOUS; SOFT TISSUE at 06:12

## 2024-03-29 RX ADMIN — SODIUM CHLORIDE, PRESERVATIVE FREE 10 ML: 5 INJECTION INTRAVENOUS at 08:40

## 2024-03-29 RX ADMIN — HYDROCODONE BITARTRATE AND ACETAMINOPHEN 1 TABLET: 5; 325 TABLET ORAL at 02:17

## 2024-03-29 RX ADMIN — GABAPENTIN 900 MG: 300 CAPSULE ORAL at 19:59

## 2024-03-29 RX ADMIN — DEXAMETHASONE SODIUM PHOSPHATE 4 MG: 4 INJECTION, SOLUTION INTRA-ARTICULAR; INTRALESIONAL; INTRAMUSCULAR; INTRAVENOUS; SOFT TISSUE at 23:10

## 2024-03-29 RX ADMIN — ONDANSETRON 4 MG: 2 INJECTION INTRAMUSCULAR; INTRAVENOUS at 01:26

## 2024-03-29 RX ADMIN — WATER 2000 MG: 1 INJECTION INTRAMUSCULAR; INTRAVENOUS; SUBCUTANEOUS at 01:17

## 2024-03-29 RX ADMIN — FAMOTIDINE 20 MG: 20 TABLET, FILM COATED ORAL at 19:59

## 2024-03-29 RX ADMIN — SODIUM CHLORIDE, PRESERVATIVE FREE 10 ML: 5 INJECTION INTRAVENOUS at 19:58

## 2024-03-29 RX ADMIN — GABAPENTIN 600 MG: 300 CAPSULE ORAL at 08:40

## 2024-03-29 RX ADMIN — ROPINIROLE HYDROCHLORIDE 1 MG: 1 TABLET, FILM COATED ORAL at 19:59

## 2024-03-29 ASSESSMENT — PAIN SCALES - GENERAL: PAINLEVEL_OUTOF10: 6

## 2024-03-29 ASSESSMENT — PAIN DESCRIPTION - DESCRIPTORS: DESCRIPTORS: ACHING

## 2024-03-29 ASSESSMENT — PAIN DESCRIPTION - ORIENTATION: ORIENTATION: MID;LOWER

## 2024-03-29 ASSESSMENT — PAIN DESCRIPTION - LOCATION: LOCATION: BACK

## 2024-03-29 NOTE — PROGRESS NOTES
Neurosurgery Progress Note     Date: 3/29/2024   Patient:  Alpa Marte  YOB: 1955  Age: 68 y.o.  MRN: [id number]       Chief Complaint:   No chief complaint on file.    Subjective     HPI -  68 y.o. female who presented to [facility] for a planned surgical intervention. She presented with complaints of chronic worsening low back pain radiating primarily to the left lower extremity all the way to the foot including chronic weakness and chronic partial left foot drop.  She does not take any medication other than over-the-counter medications and treats with ice and heat and rest.  In the past she has been treated by pain specialist who provided some injection therapies (greater than 20 years prior) that provided only transient relief. A CT scan imaged on 2/21/2024 that reflect pedicle screws from L4-S1 along with laminectomies and a grade 3 anterior listhesis of L5 on S1 unchanged. The flexion-extension x-ray also shows the grade 3 to grade 4 anterior listhesis of L5 on S1 with no evidence of additional change in flexion or extension. SI joint imaging reveals some narrowing along with sclerosis for SI joints bilaterally.      Interval History -   3/27/24: POD #1 s/p lumbar fusion. Resting in bed. Notes her pain is 8/10 at the moment, localized to her back without radiation. She continues to feel week, specifically in her left leg. She also notes some numbness, which was present prior to surgery. Of note, patient was having some odd behaviors and was messing with her portable tele-monitor stating \"its hot in her, I'm trying to get it to zero\". She was redirectable.     3/28/24: POD #2. Per nursing patient had 2 episodes of emesis overnight and increased back pain when rolling to get cleaned up. Today patient reports she is feeling much better, her pain is well controlled. She denies any headaches.       3/29/24: POD #3. Patient transferred to  yesterday. No acute events overnight. Nursing

## 2024-03-29 NOTE — PLAN OF CARE
Problem: Discharge Planning  Goal: Discharge to home or other facility with appropriate resources  Outcome: Progressing  Flowsheets (Taken 3/29/2024 1253)  Discharge to home or other facility with appropriate resources:   Identify barriers to discharge with patient and caregiver   Identify discharge learning needs (meds, wound care, etc)   Arrange for needed discharge resources and transportation as appropriate     Problem: Pain  Goal: Verbalizes/displays adequate comfort level or baseline comfort level  Outcome: Progressing  Flowsheets (Taken 3/29/2024 1253)  Verbalizes/displays adequate comfort level or baseline comfort level:   Administer analgesics based on type and severity of pain and evaluate response   Assess pain using appropriate pain scale   Implement non-pharmacological measures as appropriate and evaluate response     Problem: Safety - Adult  Goal: Free from fall injury  Outcome: Progressing  Flowsheets (Taken 3/29/2024 1253)  Free From Fall Injury: Instruct family/caregiver on patient safety

## 2024-03-29 NOTE — PROGRESS NOTES
Physician Progress Note      PATIENT:               FAYE MENESES  CSN #:                  [id number]  :                       1955  ADMIT DATE:       3/26/2024 7:58 AM  DISCH DATE:  RESPONDING  PROVIDER #:        Santino Stewart PA-C          QUERY TEXT:    Santino Butcher Valerie, Dr Castaneda, Dr Beavers,    Pt admitted for spinal procedure. Following extubation 3/26 pt weaned from 8L   Simple mask to current continuous rate 3/28 of 2LNC to maintain SPO2 >94%. No   COPD/DARIA history. Please document in progress notes and discharge summary if   you are evaluating/treating any of the following:    The medical record reflects the following:  Risk Factors: extubated 3/26 2026  Clinical Indicators: reexploration and revision of her lumbar instrumented   fusion for extension of the decompression and instrumentation to L3 and of the   pelvis  Treatment: Titration of 02 from 8LSM to 2LNC continuous 02    Teena Bhat BSN, RN  Options provided:  -- Acute Postoperative Pulmonary Insufficiency  -- Hypoxia.  -- Acute Postoperative Pulmonary Insufficiency ruled out  -- Other - I will add my own diagnosis  -- Disagree - Not applicable / Not valid  -- Disagree - Clinically unable to determine / Unknown  -- Refer to Clinical Documentation Reviewer    PROVIDER RESPONSE TEXT:    Provider disagreed with this query.  Not the attending. We are the surgery subspecialty.    Query created by: Trupti Bhat on 3/28/2024 8:17 AM      Electronically signed by:  Santino Stewart PA-C 3/29/2024 1:34 PM

## 2024-03-29 NOTE — PROGRESS NOTES
Physical Medicine & Rehabilitation   Progress Note    Chief Complaint:  lumbar surgery with dura tear. Rehab needs    History of Present Illness:  Alpa Marte is a 68 y.o. female admitted to [facility] on 3/26/2024. Patient  has a past medical history of Arthritis and Restless leg syndrome.  Patient presented to [facility] for planned lumbar intervention. patient had lumbar fusion in 1989 and again in 2003 done at [facility] resulting in pedicle screw and garima instrumentation at lumbar 4 spanning to S1. Patient presented to Dr Caldera for recurring lumbar pain with radicular pain into the left leg. Patient noted left leg weakness and partial left foot drop. CT scan imaged on 2/21/2024 that reflect pedicle screws from L4-S1 along with laminectomies and a grade 3 anterior listhesis of L5 on S1 unchanged. The flexion-extension x-ray also shows the grade 3 to grade 4 anterior listhesis of L5 on S1 with no evidence of additional change in flexion or extension. SI joint imaging revealed narrowing along with sclerosis for SI joints bilaterally.  JHON lumbar spine indicated \"Previous lumbar spine fusion from L4 through S1.  Grade 2 spondylolisthesis with spondylolysis of L5 on S1.  Disc and osteophytes narrow the neural foramina throughout the lumbar region as discussed in detail above.  No significant stenosis of the thecal sac in the lumbar region. \"  Patient elected to move forward with lumbar revision. Patient underwent Reexploration and Revision of Lumbar Instrumented Fusion for Extension of the Decompression and Instrumentation to L3-Pelvis with Dr Caldera on 3/26/24. Patient with intra-op dura leak that was repaired. Patient was placed on bedrest POD #1. Patient participated with PT 3/28 but with complaints of throbbing neck pain with movement and trouble tolerating.     3/28/24: Patient seen today in consult. Patient was just transferred from  to [facility].  Patient is resting in bed. Patient  responds to questioning and with good attention at times. Patient with slight twitching in the BUE and right eye. Throughout assessment and conversation patient would stop responding and stare. Patient would quickly re-attend to conversation and assessment when directed. Patient reports she does drink wine, patient reports not drinking daily and usually only a glass when she does drink. Patient reports of pain in her mid back and neck. PT completed today but hold on OT due to symptoms of throbbing neck pain when up with PT. Patient does not recall working with therapy earlier today.     Discussed with JAMES Weldon regarding patients scant twitching/staring and whether she is being honest about her alcohol use. Patient POD#2.  Unsure at this time which provider from Hospitalist is taking over from ICU. Admitting center did not know which provider and the RN caring for patient on 4B was also unaware who was taking over the case. Message was sent via [employer] to ICU Provider, Dr Castaneda, who was caring for patient on 4B, about EEG ordered and concern for the twitching and staring. Upon re-entering the room, patient was resting on her side, no twitching noted.     3/29/24: patient seen today, up in bathroom with RN and returning to bed. Patient with decrease in twitching today and no noted staring episodes. Patient continues to be easily distracted and not always making sense with conversation. Patient with significant amount of drainage from hemovac insertion site. Hemovac was removed this AM. Site actively draining serosanguinous fluid while patient sitting EOB. RN replacing ABD, saturated after just a couple hours. Encouraged RN to keep NSGY updated with drainage. Patient has not had therapy yet today. Will monitor therapy for discharge needs.       Current Rehabilitation Assessments:  PT:    Balance:  Static Sitting Balance:  Stand By Assistance  Dynamic Sitting Balance: Stand By Assistance, Contact Guard

## 2024-03-29 NOTE — PROGRESS NOTES
PROGRESS NOTE      Patient:  Alpa Marte  Unit/Bed:7K-04/004-A  YOB: 1955  MRN: [id number]   Acct: [id number]    PCP: Connie Augustin PA    Date of Admission: 3/26/2024 LOS: 3    Date of Evaluation:  3/29/2024    Anticipated Discharge: pending clinical course, likely 1-2 days    Assessment/Plan:    Reexploration and revision of lumbar instrumented fusion for extension of the decompression and instrumentation to L3 to pelvis on 3/26/2024 by Dr. Caldera  2/2 chronic lumbar back pain  History of lumbar fusions  POD 3  Neurosurgery following and removed Hemovac 3/29  S/p Ancef (3/27 -3/29)  Continue Decadron  Pain medications as needed per neurosurgery  PTOT   IPR following for consideration, pending progress. Pt requires precert   Leukocytosis, improving  Likely reactive to surgery  Afebrile  WBC 21.5 on 3/27  WBC 19.6 this a.m.  Episodes of staring and twitching   EEG completed on 3/29, will follow up results   Restless leg syndrome  Continue home Requip  Depression  On home cymbalta   Arthritis  Noted.   Tobacco abuse  Tobacco cessation  Regular alcohol use  Not a daily drinker  Continue to monitor sxs of withdrawal   Malnutrition  Underweight  Body mass index is 16.51 kg/m².   Work up currently being done OP            Chief Complaint: post-op state    Hospital Course: per initial HPI, \"Alpa Marte is a 68 y.o. female with past medical history of chronic lumbar back pain, restless leg syndrome, urinary incontinence, tobacco abuse, regular alcohol use, and arthritis who came in to [facility] for her lumbar surgical intervention with neurosurgery, Dr. Caldera. Chart review reveals that patient had surgical history significant for lumbar fusions in 1989 and again in 2003 done at [facility] resulting in pedicle screw and garima instrumentation at lumbar 4 spanning to S1. Reported that patient was recently seen and evaluated by Dr. Caldera as a referral on 1/15/2024. Her complaint was significant to  MD Nancy PGY-1 on 3/29/2024 at 3:03 PM

## 2024-03-29 NOTE — PROGRESS NOTES
[facility]  INPATIENT OCCUPATIONAL THERAPY  [facility] ORTHOPEDICS 7K  EVALUATION    Time:   Time In: 1415  Time Out: 1438  Timed Code Treatment Minutes: 15 Minutes  Minutes: 23          Date: 3/29/2024  Patient Name: Alpa Marte,   Gender: female      MRN: [id number]  : 1955  (68 y.o.)  Referring Practitioner: Santino Stewart PA-C  Diagnosis: Adjacent segment disease of lumbar spine with history of fusion procedure  Additional Pertinent Hx: Per MD H & P:68 y.o.  female, an every day smoker tobacco and 1/2 pack day history who admits to regular alcohol use and has a medical history significant for restless leg syndrome and urinary incontinence with a surgical history that includes prior lumbar fusions in  and again in  performed at [facility] resulting in pedicle screw and garima instrumentation at lumbar 4 spanning to S1.  She presents today unaccompanied and ambulating without assistance with complaints of chronic worsening low back pain radiating primarily to the left lower extremity all the way to the foot including chronic weakness and chronic partial left foot drop.  She does not take any medication other than over-the-counter medications and treats with ice and heat and rest.  In the past she has been treated by pain specialist who provided some injection therapies (greater than 20 years prior) that provided only transient relief. A CT scan imaged on 2024 that reflect pedicle screws from L4-S1 along with laminectomies and a grade 3 anterior listhesis of L5 on S1 unchanged. The flexion-extension x-ray also shows the grade 3 to grade 4 anterior listhesis of L5 on S1 with no evidence of additional change in flexion or extension. SI joint imaging reveals some narrowing along with sclerosis for SI joints bilaterally.  s/p Reexploration and Revision of Lumbar Instrumented Fusion for Extension of the Decompression and Instrumentation to L3-Pelvis on

## 2024-03-29 NOTE — PROGRESS NOTES
[facility]     Neurodiagnostic Laboratory Technician Worksheet      EEG Date: 3/29/2024    Name: Alpa Marte  : 1955  Age: 68 y.o.  Sex: female  MRN: [id number]  CSN: [id number]    Room/Location: [facility]  Ordering Provider: LASHAWN Caballero    EEG Number: [id number]    Time In: 0954  Time Out: 1015  Total Treatment Time: 20 min test    Clinical History: pt has a HX of restless leg, pt had episodes of staring and twitching     Hand Dominance: Right   Sedation: No   H.V. Completed: No, age protocol   Photic: Yes   Sleep: Yes   Drowsy: Yes   Sleep Deprived: No   Seizures Observed: No   Mentality: alert     Technician: Joya Moore 3/29/2024

## 2024-03-29 NOTE — PROGRESS NOTES
Continue to follow patient clinical course and therapy progress for IPR consideration.  Patient does require precert for post acute care.     Update:  1310 Rounded on unit, spoke with patient and primary RN in attendance with SAMANTHA Mcwilliams at time of visit Primary RN was doing dressing change.  Patient with a large about of drainage from dressing on back. SHERYL Michel updated on visit with the patient.

## 2024-03-29 NOTE — PROGRESS NOTES
[facility]  INPATIENT PHYSICAL THERAPY  DAILY NOTE  [facility] ORTHOPEDICS 7K - 7K-04/004-A     Time In: 1631  Time Out: 1646  Timed Code Treatment Minutes: 15 Minutes  Minutes: 15          Date: 3/29/2024  Patient Name: Alpa Marte,  Gender:  female        MRN: [id number]  : 1955  (68 y.o.)     Referring Practitioner: Santino Stewart PA-C  Diagnosis: Adjacent segment disease of lumbar spine with history of fusion procedure  Additional Pertinent Hx: Per HPI, \"Alpa Marte is an 68 y.o.  female, an every day smoker tobacco and 1/2 pack day history who admits to regular alcohol use and has a medical history significant for restless leg syndrome and urinary incontinence with a surgical history that includes prior lumbar fusions in  and again in  performed at [facility] resulting in pedicle screw and garima instrumentation at lumbar 4 spanning to S1.  She presents today unaccompanied and ambulating without assistance with complaints of chronic worsening low back pain radiating primarily to the left lower extremity all the way to the foot including chronic weakness and chronic partial left foot drop.  She does not take any medication other than over-the-counter medications and treats with ice and heat and rest.  In the past she has been treated by pain specialist who provided some injection therapies (greater than 20 years prior) that provided only transient relief. A CT scan imaged on 2024 that reflect pedicle screws from L4-S1 along with laminectomies and a grade 3 anterior listhesis of L5 on S1 unchanged. The flexion-extension x-ray also shows the grade 3 to grade 4 anterior listhesis of L5 on S1 with no evidence of additional change in flexion or extension. SI joint imaging reveals some narrowing along with sclerosis for SI joints bilaterally.\"  Pt is now s/p Reexploration and Revision of Lumbar Instrumented Fusion for Extension of the Decompression and  Instrumentation to L3-Pelvis. Pt was noted to have dura leak during surgery and has been on bedrest since surgery on 3/26/24     Prior Level of Function:  Lives With: Alone  Type of Home: Trailer  Home Layout: One level  Home Access: Stairs to enter with rails  Home Equipment: Cane, Walker, rolling   Bathroom Shower/Tub: Tub/Shower unit  Bathroom Toilet: Standard  Bathroom Equipment: Tub transfer bench  Bathroom Accessibility: Accessible    Receives Help From: Friend(s)  ADL Assistance: Independent  Homemaking Assistance: Independent  Homemaking Responsibilities: Yes  Ambulation Assistance: Independent  Transfer Assistance: Independent  Active : Yes    Restrictions/Precautions:  Restrictions/Precautions: General Precautions, Fall Risk  Required Braces or Orthoses  Spinal: Lumbar Corset  Position Activity Restriction  Spinal Precautions: No Bending, No Lifting, No Twisting      SUBJECTIVE: Pt sitting up in room chair and agrees to therapy. Pt denies headache and has been able to ambulate with nursing and OT today.    PAIN: denied      Vitals: Vitals not assessed per clinical judgement, see nursing flowsheet    OBJECTIVE:  Bed Mobility:  Not Tested    Transfers:  Sit to Stand: Stand By Assistance  Stand to Sit:Stand By Assistance    Ambulation:  Stand By Assistance  Distance: 40 ft   Surface: Level Tile  Device:Rolling Walker  Gait Deviations:  Decreased Step Length Bilaterally and Decreased Gait Speed    Balance:  Static Sitting Balance:  Supervision  Dynamic Sitting Balance: Supervision, Stand By Assistance  Static Standing Balance: Stand By Assistance  Dynamic Standing Balance: Stand By Assistance    Exercise:  Patient was guided in 1 set(s) 10 reps of exercise to both lower extremities.  Ankle pumps, Seated marches, and Long arc quads.  Exercises were completed for increased independence with functional mobility.    Functional Outcome Measures: Completed  AM-PAC Inpatient Mobility Raw Score : 16  AM-PAC

## 2024-03-29 NOTE — CARE COORDINATION
3/29/24, 7:31 AM EDT    DISCHARGE ON GOING EVALUATION    [facility] [employer] [employer] [facility] [employer] day: 3  Location: 7K-04/004-A Reason for admit: Adjacent segment disease of lumbar spine with history of fusion procedure [M51.36, Z98.1]  S/P lumbar fusion [Z98.1]   Procedure:   3/26 Reexploration and Revision of Lumbar Instrumented Fusion for Extension of the Decompression and Instrumentation to L3-Pelvis    3/29 lumbar drain removed  Barriers to Discharge: POD 3. Tx to 7K 04 from 4B07, monitor lumbar drain output x 1, Ancef IV, Hgb 13.4. PT/OT slow progression due to dura leak. LSO brace. Afebrile.  Post drain removal pt has noted drainage present, less twitching today, inappropriate conversation. Up with PT/OT and RW. WBC 19.6 (21.5). Decadron IV, Ancef stopped. Afebrile.  PCP: Connie Augustin PA  Readmission Risk Score: 10.6%  Patient Goals/Plan/Treatment Preferences: IP rehab CNP saw pt; will recheck for appropriateness to go to IPR. Pt will be precert for IP rehab when med ready.

## 2024-03-30 ENCOUNTER — APPOINTMENT (OUTPATIENT)
Dept: CT IMAGING | Age: 69
End: 2024-03-30
Attending: NEUROLOGICAL SURGERY
Payer: MEDICARE

## 2024-03-30 ENCOUNTER — APPOINTMENT (OUTPATIENT)
Dept: MRI IMAGING | Age: 69
End: 2024-03-30
Attending: NEUROLOGICAL SURGERY
Payer: MEDICARE

## 2024-03-30 PROBLEM — R40.4 TRANSIENT ALTERATION OF AWARENESS: Status: ACTIVE | Noted: 2024-03-30

## 2024-03-30 LAB
ANION GAP SERPL CALC-SCNC: 13 MEQ/L (ref 8–16)
BUN SERPL-MCNC: 20 MG/DL (ref 7–22)
CALCIUM SERPL-MCNC: 9.4 MG/DL (ref 8.5–10.5)
CHLORIDE SERPL-SCNC: 97 MEQ/L (ref 98–111)
CO2 SERPL-SCNC: 26 MEQ/L (ref 23–33)
CREAT SERPL-MCNC: 0.5 MG/DL (ref 0.4–1.2)
DEPRECATED RDW RBC AUTO: 45.8 FL (ref 35–45)
ERYTHROCYTE [DISTWIDTH] IN BLOOD BY AUTOMATED COUNT: 12.9 % (ref 11.5–14.5)
GFR SERPL CREATININE-BSD FRML MDRD: > 90 ML/MIN/1.73M2
GLUCOSE SERPL-MCNC: 115 MG/DL (ref 70–108)
HCT VFR BLD AUTO: 38.3 % (ref 37–47)
HGB BLD-MCNC: 13.1 GM/DL (ref 12–16)
MCH RBC QN AUTO: 33.1 PG (ref 26–33)
MCHC RBC AUTO-ENTMCNC: 34.2 GM/DL (ref 32.2–35.5)
MCV RBC AUTO: 96.7 FL (ref 81–99)
PLATELET # BLD AUTO: 225 THOU/MM3 (ref 130–400)
PMV BLD AUTO: 12.1 FL (ref 9.4–12.4)
POTASSIUM SERPL-SCNC: 4.8 MEQ/L (ref 3.5–5.2)
RBC # BLD AUTO: 3.96 MILL/MM3 (ref 4.2–5.4)
SODIUM SERPL-SCNC: 136 MEQ/L (ref 135–145)
WBC # BLD AUTO: 18.5 THOU/MM3 (ref 4.8–10.8)

## 2024-03-30 PROCEDURE — 70551 MRI BRAIN STEM W/O DYE: CPT

## 2024-03-30 PROCEDURE — 80048 BASIC METABOLIC PNL TOTAL CA: CPT

## 2024-03-30 PROCEDURE — 70470 CT HEAD/BRAIN W/O & W/DYE: CPT

## 2024-03-30 PROCEDURE — 97530 THERAPEUTIC ACTIVITIES: CPT

## 2024-03-30 PROCEDURE — 99024 POSTOP FOLLOW-UP VISIT: CPT | Performed by: NEUROLOGICAL SURGERY

## 2024-03-30 PROCEDURE — 36415 COLL VENOUS BLD VENIPUNCTURE: CPT

## 2024-03-30 PROCEDURE — 2580000003 HC RX 258: Performed by: PHYSICIAN ASSISTANT

## 2024-03-30 PROCEDURE — 1200000000 HC SEMI PRIVATE

## 2024-03-30 PROCEDURE — 6370000000 HC RX 637 (ALT 250 FOR IP): Performed by: NURSE PRACTITIONER

## 2024-03-30 PROCEDURE — 85027 COMPLETE CBC AUTOMATED: CPT

## 2024-03-30 PROCEDURE — APPSS60 APP SPLIT SHARED TIME 46-60 MINUTES: Performed by: PHYSICIAN ASSISTANT

## 2024-03-30 PROCEDURE — 6360000002 HC RX W HCPCS: Performed by: NURSE PRACTITIONER

## 2024-03-30 PROCEDURE — 6360000002 HC RX W HCPCS: Performed by: PHYSICIAN ASSISTANT

## 2024-03-30 PROCEDURE — 97535 SELF CARE MNGMENT TRAINING: CPT

## 2024-03-30 PROCEDURE — 6370000000 HC RX 637 (ALT 250 FOR IP): Performed by: PHYSICIAN ASSISTANT

## 2024-03-30 PROCEDURE — 97110 THERAPEUTIC EXERCISES: CPT

## 2024-03-30 PROCEDURE — 6360000004 HC RX CONTRAST MEDICATION

## 2024-03-30 PROCEDURE — 99223 1ST HOSP IP/OBS HIGH 75: CPT | Performed by: NURSE PRACTITIONER

## 2024-03-30 PROCEDURE — 99232 SBSQ HOSP IP/OBS MODERATE 35: CPT | Performed by: HOSPITALIST

## 2024-03-30 RX ADMIN — DEXAMETHASONE SODIUM PHOSPHATE 4 MG: 4 INJECTION, SOLUTION INTRA-ARTICULAR; INTRALESIONAL; INTRAMUSCULAR; INTRAVENOUS; SOFT TISSUE at 11:19

## 2024-03-30 RX ADMIN — DEXAMETHASONE SODIUM PHOSPHATE 4 MG: 4 INJECTION, SOLUTION INTRA-ARTICULAR; INTRALESIONAL; INTRAMUSCULAR; INTRAVENOUS; SOFT TISSUE at 18:15

## 2024-03-30 RX ADMIN — IOPAMIDOL 80 ML: 755 INJECTION, SOLUTION INTRAVENOUS at 14:05

## 2024-03-30 RX ADMIN — ONDANSETRON 4 MG: 2 INJECTION INTRAMUSCULAR; INTRAVENOUS at 08:13

## 2024-03-30 RX ADMIN — SODIUM CHLORIDE, PRESERVATIVE FREE 10 ML: 5 INJECTION INTRAVENOUS at 08:15

## 2024-03-30 RX ADMIN — HYDROCODONE BITARTRATE AND ACETAMINOPHEN 1 TABLET: 5; 325 TABLET ORAL at 05:02

## 2024-03-30 RX ADMIN — GABAPENTIN 600 MG: 300 CAPSULE ORAL at 08:13

## 2024-03-30 RX ADMIN — DULOXETINE HYDROCHLORIDE 20 MG: 20 CAPSULE, DELAYED RELEASE ORAL at 20:11

## 2024-03-30 RX ADMIN — DEXAMETHASONE SODIUM PHOSPHATE 4 MG: 4 INJECTION, SOLUTION INTRA-ARTICULAR; INTRALESIONAL; INTRAMUSCULAR; INTRAVENOUS; SOFT TISSUE at 05:19

## 2024-03-30 RX ADMIN — FAMOTIDINE 20 MG: 20 TABLET, FILM COATED ORAL at 20:11

## 2024-03-30 RX ADMIN — GABAPENTIN 900 MG: 300 CAPSULE ORAL at 20:10

## 2024-03-30 RX ADMIN — DEXAMETHASONE SODIUM PHOSPHATE 4 MG: 4 INJECTION, SOLUTION INTRA-ARTICULAR; INTRALESIONAL; INTRAMUSCULAR; INTRAVENOUS; SOFT TISSUE at 23:21

## 2024-03-30 RX ADMIN — SODIUM CHLORIDE, PRESERVATIVE FREE 10 ML: 5 INJECTION INTRAVENOUS at 20:11

## 2024-03-30 RX ADMIN — CYCLOBENZAPRINE 10 MG: 10 TABLET, FILM COATED ORAL at 08:14

## 2024-03-30 RX ADMIN — FAMOTIDINE 20 MG: 20 TABLET, FILM COATED ORAL at 08:15

## 2024-03-30 RX ADMIN — HYDROCODONE BITARTRATE AND ACETAMINOPHEN 1 TABLET: 5; 325 TABLET ORAL at 09:34

## 2024-03-30 RX ADMIN — ROPINIROLE HYDROCHLORIDE 1 MG: 1 TABLET, FILM COATED ORAL at 20:11

## 2024-03-30 RX ADMIN — SODIUM CHLORIDE, PRESERVATIVE FREE 10 ML: 5 INJECTION INTRAVENOUS at 18:15

## 2024-03-30 ASSESSMENT — PAIN SCALES - GENERAL
PAINLEVEL_OUTOF10: 10
PAINLEVEL_OUTOF10: 2
PAINLEVEL_OUTOF10: 7
PAINLEVEL_OUTOF10: 10

## 2024-03-30 ASSESSMENT — PAIN DESCRIPTION - LOCATION
LOCATION: BACK

## 2024-03-30 ASSESSMENT — PAIN DESCRIPTION - DESCRIPTORS: DESCRIPTORS: ACHING

## 2024-03-30 ASSESSMENT — PAIN DESCRIPTION - ORIENTATION
ORIENTATION: LOWER;MID
ORIENTATION: LOWER;MID
ORIENTATION: MID;LOWER

## 2024-03-30 ASSESSMENT — PAIN - FUNCTIONAL ASSESSMENT
PAIN_FUNCTIONAL_ASSESSMENT: ACTIVITIES ARE NOT PREVENTED
PAIN_FUNCTIONAL_ASSESSMENT: ACTIVITIES ARE NOT PREVENTED

## 2024-03-30 NOTE — PLAN OF CARE
Problem: Discharge Planning  Goal: Discharge to home or other facility with appropriate resources  3/30/2024 0032 by Keely Alamo RN  Outcome: Progressing  Flowsheets (Taken 3/30/2024 0032)  Discharge to home or other facility with appropriate resources:   Identify barriers to discharge with patient and caregiver   Arrange for needed discharge resources and transportation as appropriate   Identify discharge learning needs (meds, wound care, etc)     Problem: Pain  Goal: Verbalizes/displays adequate comfort level or baseline comfort level  3/30/2024 0032 by Keely Alamo RN  Outcome: Progressing  Flowsheets (Taken 3/30/2024 0032)  Verbalizes/displays adequate comfort level or baseline comfort level:   Encourage patient to monitor pain and request assistance   Assess pain using appropriate pain scale   Administer analgesics based on type and severity of pain and evaluate response   Implement non-pharmacological measures as appropriate and evaluate response     Problem: ABCDS Injury Assessment  Goal: Absence of physical injury  Outcome: Progressing  Flowsheets (Taken 3/30/2024 0032)  Absence of Physical Injury: Implement safety measures based on patient assessment     Problem: Safety - Adult  Goal: Free from fall injury  3/30/2024 0032 by Keely Alamo RN  Outcome: Progressing  Flowsheets (Taken 3/30/2024 0032)  Free From Fall Injury: Instruct family/caregiver on patient safety     Problem: Nutrition Deficit:  Goal: Optimize nutritional status  Outcome: Progressing  Flowsheets (Taken 3/30/2024 0032)  Nutrient intake appropriate for improving, restoring, or maintaining nutritional needs:   Assess nutritional status and recommend course of action   Monitor oral intake, labs, and treatment plans     Problem: Skin/Tissue Integrity  Goal: Absence of new skin breakdown  Description: 1.  Monitor for areas of redness and/or skin breakdown  2.  Assess vascular access sites hourly  3.  Every 4-6 hours minimum:  Change

## 2024-03-30 NOTE — PROGRESS NOTES
PROGRESS NOTE      Patient:  Alpa Marte  Unit/Bed:7K-04/004-A  YOB: 1955  MRN: [id number]   Acct: [id number]    PCP: Connie Augustin PA    Date of Admission: 3/26/2024 LOS: 4    Date of Evaluation:  3/30/2024    Anticipated Discharge:  pending clinical course    Assessment/Plan:    Reexploration and revision of lumbar instrumented fusion for extension of the decompression and instrumentation to L3 to pelvis on 3/26/2024 by Dr. Caldera  2/2 chronic lumbar back pain  History of lumbar fusions  POD 4  Neurosurgery following and removed Hemovac 3/29.  Per neurosurg, plan for possible lumbar drain placement if discharge from incision continues, will reassess in the morning.    I personally spoke to neurosurgery on 3/30 in regards to DVT prophylaxis and when they recommend restart.  Per Santino from neurosurgery , he states hold anticoagulation for possible lumbar drain placement tomorrow.  SCDs in the meantime  S/p Ancef (3/27 -3/29)  Continue Decadron  Pain medications as needed per neurosurgery  PTOT   IPR following for consideration, pending progress. Pt requires precert if IPR is needed  Leukocytosis, improving  Likely reactive to surgery  Afebrile  WBC 21.5 on 3/27  WBC 18.5 on 3/30  Episodes of staring and twitching   EEG completed on 3/29 and was abnormal suggesting mild to moderate encephalopathy of nonspecific etiology  Patient has no history of seizures  Neurology consulted on 3/30, will follow-up recommendations  Per nurse on 3/30 patient has no further episodes of staring off however does have episodes of confusion.  Patient had visitors evening of 3/29 and state patient was not at her baseline.  On morning of 3/30 patient states she gets confused at times because she has not had much sleep while being in the hospital only getting about 3 to 5 hours.  Will order CT head, will follow-up  Restless leg syndrome  Continue home Requip  Depression  On home cymbalta   Arthritis  Noted.   Tobacco  in flexion or extension. SI joint imaging reveals some narrowing along with sclerosis for SI joints bilaterally. Due to severity of pain that progressively getting worse and having failed medications treatment, patient decided to move forward for another surgical intervention. Today, patient underwent reexploration and revision of lumbar instrumented fusion for extension of the decompression and instrumentation to L3-Pelvis, surgical procedure done by Dr. Caldera with EBL of 500 cc. From PACU, patient got admitted to stepdown for post-op stated close hemodynamic monitoring, and ICU team got consulted for medical management care.     On ICU arrival, patient is awake, alert, oriented x 4, and able to follow commands, no family member at bedside. VS stable, no fever, and respiratory status stable on 2 L oxygen support via nasal cannula. No acute labored breathing noted on examination. Patient denied pain and discomfort. Patient confirmed full code status at this time, primary nurse at bedside.    Subjective/HPI:   Alpa Marte feels well overall, but admits to 7/10 back pain, improving and stable on pain medications. She denies HA, SOB, CP, N/V/D, dysuria hematuria urinary frequency/urgency.  Discussed PT/OT recommendations for IPR, patient states she was not aware of possible need for inpatient rehab.  Rest continuing working with PT/OT and we will reassess daily.  Patient understands and agrees with plan.  All questions answered      PMH, SURGICAL HX, FH, SOCIAL HX reviewed and updated as needed.    Medications:  Reviewed    Infusion Medications    sodium chloride       Scheduled Medications    famotidine  20 mg Oral BID    gabapentin  600 mg Oral QAM    And    gabapentin  900 mg Oral Nightly    rOPINIRole  1 mg Oral Nightly    DULoxetine  20 mg Oral Nightly    sodium chloride flush  5-40 mL IntraVENous 2 times per day    dexAMETHasone  4 mg IntraVENous Q6H     PRN Meds: ondansetron, labetalol, sodium chloride flush,  radiology images and reports reviewed and interpreted by me:  Radiology:  FLUORO FOR SURGICAL PROCEDURES   Final Result      XR LUMBAR SPINE (2-3 VIEWS)   Final Result    Intraoperative images for level localization.               **This report has been created using voice recognition software. It may contain minor errors which are inherent in voice recognition technology.**      Final report electronically signed by Dr. Luz Marina Robison on 3/27/2024 11:52 AM      CT HEAD W WO CONTRAST    (Results Pending)       Diet: ADULT ORAL NUTRITION SUPPLEMENT; Breakfast, Lunch, Dinner; Clear Liquid Oral Supplement  ADULT DIET; Easy to Chew  ADULT ORAL NUTRITION SUPPLEMENT; Breakfast, Lunch, Dinner; Other Oral Supplement; Activia and hot beverage    Lama: no    Microbiology: no      DVT prophylaxis: [] Lovenox                                 [x] SCDs                                 [] SQ Heparin                                 [] Encourage ambulation           [] Already on Anticoagulation     Disposition:    [] Home       [] TCU       [] Rehab       [] Psych       [] SNF       [] Long Term Care Facility       [x] Other-pending progress. Possibly IPR    Code Status: Full Code    PT/OT Eval Status: following      An electronic signature was used to authenticate this note  - Venus Cruz MD PGY-1 on 3/30/2024 at 2:00 PM

## 2024-03-30 NOTE — PROGRESS NOTES
[facility]  INPATIENT PHYSICAL THERAPY  DAILY NOTE  [facility] ORTHOPEDICS 7K - 7K-04/004-A    Time In: 0712  Time Out: 0737  Timed Code Treatment Minutes: 25 Minutes  Minutes: 25        Date: 3/30/2024  Patient Name: Alpa Marte,  Gender:  female        MRN: [id number]  : 1955  (68 y.o.)     Referring Practitioner: Santino Stewart PA-C  Diagnosis: Adjacent segment disease of lumbar spine with history of fusion procedure  Additional Pertinent Hx: Per HPI, \"Alpa Marte is an 68 y.o.  female, an every day smoker tobacco and 1/2 pack day history who admits to regular alcohol use and has a medical history significant for restless leg syndrome and urinary incontinence with a surgical history that includes prior lumbar fusions in  and again in  performed at [facility] resulting in pedicle screw and garima instrumentation at lumbar 4 spanning to S1.  She presents today unaccompanied and ambulating without assistance with complaints of chronic worsening low back pain radiating primarily to the left lower extremity all the way to the foot including chronic weakness and chronic partial left foot drop.  She does not take any medication other than over-the-counter medications and treats with ice and heat and rest.  In the past she has been treated by pain specialist who provided some injection therapies (greater than 20 years prior) that provided only transient relief. A CT scan imaged on 2024 that reflect pedicle screws from L4-S1 along with laminectomies and a grade 3 anterior listhesis of L5 on S1 unchanged. The flexion-extension x-ray also shows the grade 3 to grade 4 anterior listhesis of L5 on S1 with no evidence of additional change in flexion or extension. SI joint imaging reveals some narrowing along with sclerosis for SI joints bilaterally.\"  Pt is now s/p Reexploration and Revision of Lumbar Instrumented Fusion for Extension of the Decompression and  Inpatient Therapy Stay  Plan: Current Treatment Recommendations: Strengthening, Balance training, Functional mobility training, Gait training, Transfer training, ADL/Self-care training, Stair training, Home exercise program, Safety education & training, Patient/Caregiver education & training, Endurance training, Therapeutic activities  General Plan:  (6x N)    Education  Education:  Learners: Patient  Patient Education: Bed Mobility, Transfers, Gait, Verbal Exercise Instruction,  - Patient Verbalized Understanding    Goals:  Patient Goals : go home  Short Term Goals  Time Frame for Short Term Goals: at discharge  Short Term Goal 1: Pt to be Mod I for supine <> sit to get in/out of bed  Short Term Goal 2: Pt to be Mod I for sit <> stand to get up to ambulate  Short Term Goal 3: Pt to ambulate >100 ft with RW with Supervision for household distances  Short Term Goal 4: Pt to negotiate 3-4 steps with 1 rail with Supervision for home access  Long Term Goals  Time Frame for Long Term Goals : not set due to short ELOS    Following session, patient left in safe position with all fall risk precautions in place.

## 2024-03-30 NOTE — PROGRESS NOTES
[facility] ORTHOPEDICS 7K  Occupational Therapy  Daily Note  Time:   Time In: 1315  Time Out: 1343  Timed Code Treatment Minutes: 28 Minutes  Minutes: 28          Date: 3/30/2024  Patient Name: Alpa Marte,   Gender: female      Room: [facility]04004-A  MRN: [id number]  : 1955  (68 y.o.)  Referring Practitioner: Santino Stewart PA-C  Diagnosis: Adjacent segment disease of lumbar spine with history of fusion procedure  Additional Pertinent Hx: Per MD H & P:68 y.o.  female, an every day smoker tobacco and 1/2 pack day history who admits to regular alcohol use and has a medical history significant for restless leg syndrome and urinary incontinence with a surgical history that includes prior lumbar fusions in  and again in  performed at [facility] resulting in pedicle screw and garima instrumentation at lumbar 4 spanning to S1.  She presents today unaccompanied and ambulating without assistance with complaints of chronic worsening low back pain radiating primarily to the left lower extremity all the way to the foot including chronic weakness and chronic partial left foot drop.  She does not take any medication other than over-the-counter medications and treats with ice and heat and rest.  In the past she has been treated by pain specialist who provided some injection therapies (greater than 20 years prior) that provided only transient relief. A CT scan imaged on 2024 that reflect pedicle screws from L4-S1 along with laminectomies and a grade 3 anterior listhesis of L5 on S1 unchanged. The flexion-extension x-ray also shows the grade 3 to grade 4 anterior listhesis of L5 on S1 with no evidence of additional change in flexion or extension. SI joint imaging reveals some narrowing along with sclerosis for SI joints bilaterally.  s/p Reexploration and Revision of Lumbar Instrumented Fusion for Extension of the Decompression and Instrumentation to L3-Pelvis on  3/26    Restrictions/Precautions:  Restrictions/Precautions: General Precautions, Fall Risk  Required Braces or Orthoses  Spinal: Lumbar Corset  Position Activity Restriction  Spinal Precautions: No Bending, No Lifting, No Twisting     Social/Functional History:  Lives With: Alone  Type of Home: Trailer  Home Layout: One level  Home Access: Stairs to enter with rails  Home Equipment: Cane, Walker, rolling   Bathroom Shower/Tub: Tub/Shower unit  Bathroom Toilet: Standard  Bathroom Equipment: Tub transfer bench  Bathroom Accessibility: Accessible    Receives Help From: Friend(s)  ADL Assistance: Independent  Homemaking Assistance: Independent  Homemaking Responsibilities: Yes  Ambulation Assistance: Independent  Transfer Assistance: Independent    Active : Yes          SUBJECTIVE: pt sitting up in recliner when arrived.Pt agreeable to OT   back of gown was saturated and chucks pad in recliner was saturated of fluid told RN and she to change her back dressing.  Did change pts gown for her     PAIN: pt stated she has back pain and headache    Vitals: Vitals not assessed per clinical judgement, see nursing flowsheet    COGNITION: Decreased Insight, Inattention, Decreased Problem Solving, and Decreased Safety Awareness  Pt able to recall 2/3 back precatuions   ADL:   Grooming: Stand By Assistance.  Standing at sink to wash hands   Toileting: Contact Guard Assistance.  Able to complete all care   Toilet Transfer: Stand By Assistance. To STS with grab bar  .    IADL:   Not Tested    BALANCE:  Standing Balance: Contact Guard Assistance. At RW     BED MOBILITY:  Sit to Supine: Contact Guard Assistance HOB flat     TRANSFERS:  Sit to Stand:  Contact Guard Assistance. From recliner  Stand to Sit: Contact Guard Assistance. To EOB and STS     FUNCTIONAL MOBILITY:  Assistive Device: Rolling Walker  Assist Level:  Contact Guard Assistance.   Distance: To and from bathroom and into hallway of unit   Pt had no LOB and demo fair

## 2024-03-30 NOTE — PROGRESS NOTES
Attending Note:     Patient was seen and examined by me in floor in conjunction with neurosurgery PA (Santino Stewart PA-C).  Discussed with patient, her nurse as well.  All data and imaging reviewed by myself. I agree with examination assessment and plan as documented below.     Incisional pain issues however he reported significant improvement in her preop pain (the pain that used to have before surgery).  Concern about possible CSF leak from the wound (will observe till tomorrow and if it is persistent then we will consider lumbar drain placement then we may consider wound revision).  I explained the above recommendation treatment plan from neurosurgical perspective to the patient in detail in front of the neurosurgery PA.  Neurosurgery will follow.     Charlie Caldera MD      Neurosurgery Progress Note    Patient:  Alpa Marte      Unit/Bed:[facility]04/004-A    YOB: 1955    MRN: [id number]     Acct: [id number]     Admit date: 3/26/2024    No chief complaint on file.      Patient Seen, Chart, Physician notes, Labs, Radiology studies reviewed.    Subjective: Patient is seen and evaluated on the floor postoperative day 4 from lumbar decompression and instrumented fusion revision as performed by Dr. Caldera/neurosurgeon, without complication.  Pain is only moderately controlled today.        Past, Family, Social History unchanged from admission.    Diet:  ADULT ORAL NUTRITION SUPPLEMENT; Breakfast, Lunch, Dinner; Clear Liquid Oral Supplement  ADULT DIET; Easy to Chew  ADULT ORAL NUTRITION SUPPLEMENT; Breakfast, Lunch, Dinner; Other Oral Supplement; Activia and hot beverage    Medications:  Scheduled Meds:   famotidine  20 mg Oral BID    gabapentin  600 mg Oral QAM    And    gabapentin  900 mg Oral Nightly    rOPINIRole  1 mg Oral Nightly    DULoxetine  20 mg Oral Nightly    sodium chloride flush  5-40 mL IntraVENous 2 times per day    dexAMETHasone  4 mg IntraVENous Q6H     Continuous Infusions:   sodium  03/30/24  0528   CALCIUM 9.4     Magnesium:No results for input(s): \"MG\" in the last 72 hours.  Glucose:  Recent Labs     03/28/24  1816   POCGLU 155*     HgbA1C: No results for input(s): \"LABA1C\" in the last 72 hours.  Lipids: No results for input(s): \"CHOL\", \"TRIG\", \"HDL\", \"LDL\", \"LDLCALC\" in the last 72 hours.    Radiology reports as per the Radiologist  Radiology: XR LUMBAR SPINE (2-3 VIEWS)    Result Date: 3/27/2024  PROCEDURE: XR LUMBAR SPINE (2-3 VIEWS) CLINICAL INFORMATION: Reexploration and Revision of Lumbar Instrumented Fusion for Extension of the Decompression and Instrumentation to L3-Pelvis (Head), COMPARISON: No prior study. TECHNIQUE: AP and lateral views of the lumbar spine were obtained intraoperatively. FINDINGS: There are bilateral pedicle screws in L3, L4, L5, S1 and traversing both sacroiliac joints. The hardware appears intact.      Intraoperative images for level localization. **This report has been created using voice recognition software. It may contain minor errors which are inherent in voice recognition technology.** Final report electronically signed by Dr. Luz Marina Robison on 3/27/2024 11:52 AM    FLUORO FOR SURGICAL PROCEDURES    Result Date: 3/26/2024  Radiology exam is complete. No Radiologist dictation. Please follow up with ordering provider.                    Assessment: Postoperative day 4 from lumbar decompression and instrumented fusion revision and extension as performed by Dr. Caldera/neurosurgeon.    Principal Problem:    S/P lumbar fusion  Active Problems:    Moderate malnutrition (HCC)  Resolved Problems:    * No resolved hospital problems. *        Plan: The patient is seen and evaluated on the floor with evaluation and exam findings reviewed and discussed with Dr. Caldera/neurosurgeon and the nursing.  Patient is sitting up in a chair with pain moderately controlled.  Complains of incisional site discomfort remain with improvement noted for lower extremity pain following the

## 2024-03-30 NOTE — PROCEDURES
EEG REPORT       Patient: Alpa Marte Age: 68 y.o.  MRN: [id number]      Referring Provider: No ref. provider found    History: This routine 30 minute scalp EEG was recorded with video- monitoring for a 68 y.o.. female who presented with encephalopathy. This EEG was performed to evaluate for focal and epileptiform abnormalities.     Alpa Marte   Current Facility-Administered Medications   Medication Dose Route Frequency Provider Last Rate Last Admin    famotidine (PEPCID) tablet 20 mg  20 mg Oral BID Kelly Lassiter APRN - CNP   20 mg at 03/29/24 1959    ondansetron (ZOFRAN) injection 4 mg  4 mg IntraVENous Q6H PRN Kelly Lassiter APRN - CNP   4 mg at 03/29/24 0126    labetalol (NORMODYNE;TRANDATE) injection 5 mg  5 mg IntraVENous Q4H PRN Kelly Lassiter APRN - CNP   5 mg at 03/28/24 0035    gabapentin (NEURONTIN) capsule 600 mg  600 mg Oral QAM Santino Stewart PA-C   600 mg at 03/29/24 0840    And    gabapentin (NEURONTIN) capsule 900 mg  900 mg Oral Nightly Santino Stewart PA-C   900 mg at 03/29/24 1959    rOPINIRole (REQUIP) tablet 1 mg  1 mg Oral Nightly Santino Stewart PA-C   1 mg at 03/29/24 1959    DULoxetine (CYMBALTA) extended release capsule 20 mg  20 mg Oral Nightly Santino Stewart PA-C   20 mg at 03/29/24 1959    sodium chloride flush 0.9 % injection 5-40 mL  5-40 mL IntraVENous 2 times per day Santino Stewart PA-C   10 mL at 03/29/24 1958    sodium chloride flush 0.9 % injection 5-40 mL  5-40 mL IntraVENous PRN Santino Stewart PA-C   10 mL at 03/28/24 1634    0.9 % sodium chloride infusion   IntraVENous PRN Santino Stewart PA-C        morphine (PF) injection 2 mg  2 mg IntraVENous Q2H PRN Santino Stewart PA-C   2 mg at 03/28/24 0409    Or    morphine injection 4 mg  4 mg IntraVENous Q2H PRN Santino Stewart PA-C   4 mg at 03/28/24 0152    HYDROcodone-acetaminophen (NORCO) 5-325 MG per tablet 1 tablet  1 tablet Oral Q4H PRN Santino Stewart PA-C   1

## 2024-03-31 ENCOUNTER — APPOINTMENT (OUTPATIENT)
Dept: CT IMAGING | Age: 69
End: 2024-03-31
Attending: NEUROLOGICAL SURGERY
Payer: MEDICARE

## 2024-03-31 PROBLEM — I61.4 CEREBELLAR HEMORRHAGE (HCC): Status: ACTIVE | Noted: 2024-03-31

## 2024-03-31 PROBLEM — I62.9 INTRACRANIAL BLEEDING (HCC): Status: ACTIVE | Noted: 2024-03-31

## 2024-03-31 LAB
ANION GAP SERPL CALC-SCNC: 16 MEQ/L (ref 8–16)
BUN SERPL-MCNC: 20 MG/DL (ref 7–22)
CALCIUM SERPL-MCNC: 9.1 MG/DL (ref 8.5–10.5)
CHLORIDE SERPL-SCNC: 94 MEQ/L (ref 98–111)
CO2 SERPL-SCNC: 23 MEQ/L (ref 23–33)
CREAT SERPL-MCNC: 0.6 MG/DL (ref 0.4–1.2)
DEPRECATED RDW RBC AUTO: 46 FL (ref 35–45)
ERYTHROCYTE [DISTWIDTH] IN BLOOD BY AUTOMATED COUNT: 13 % (ref 11.5–14.5)
GFR SERPL CREATININE-BSD FRML MDRD: > 90 ML/MIN/1.73M2
GLUCOSE SERPL-MCNC: 116 MG/DL (ref 70–108)
HCT VFR BLD AUTO: 36.9 % (ref 37–47)
HGB BLD-MCNC: 12.7 GM/DL (ref 12–16)
MCH RBC QN AUTO: 33.1 PG (ref 26–33)
MCHC RBC AUTO-ENTMCNC: 34.4 GM/DL (ref 32.2–35.5)
MCV RBC AUTO: 96.1 FL (ref 81–99)
PLATELET # BLD AUTO: 241 THOU/MM3 (ref 130–400)
PMV BLD AUTO: 11.8 FL (ref 9.4–12.4)
POTASSIUM SERPL-SCNC: 3.9 MEQ/L (ref 3.5–5.2)
RBC # BLD AUTO: 3.84 MILL/MM3 (ref 4.2–5.4)
SODIUM SERPL-SCNC: 133 MEQ/L (ref 135–145)
WBC # BLD AUTO: 14.8 THOU/MM3 (ref 4.8–10.8)

## 2024-03-31 PROCEDURE — 99223 1ST HOSP IP/OBS HIGH 75: CPT | Performed by: INTERNAL MEDICINE

## 2024-03-31 PROCEDURE — 93005 ELECTROCARDIOGRAM TRACING: CPT | Performed by: HOSPITALIST

## 2024-03-31 PROCEDURE — 80048 BASIC METABOLIC PNL TOTAL CA: CPT

## 2024-03-31 PROCEDURE — 6370000000 HC RX 637 (ALT 250 FOR IP): Performed by: NURSE PRACTITIONER

## 2024-03-31 PROCEDURE — APPSS60 APP SPLIT SHARED TIME 46-60 MINUTES: Performed by: PHYSICIAN ASSISTANT

## 2024-03-31 PROCEDURE — 99232 SBSQ HOSP IP/OBS MODERATE 35: CPT | Performed by: HOSPITALIST

## 2024-03-31 PROCEDURE — 6360000002 HC RX W HCPCS: Performed by: PHYSICIAN ASSISTANT

## 2024-03-31 PROCEDURE — 85027 COMPLETE CBC AUTOMATED: CPT

## 2024-03-31 PROCEDURE — 2580000003 HC RX 258: Performed by: PHYSICIAN ASSISTANT

## 2024-03-31 PROCEDURE — 6370000000 HC RX 637 (ALT 250 FOR IP): Performed by: PHYSICIAN ASSISTANT

## 2024-03-31 PROCEDURE — 6360000004 HC RX CONTRAST MEDICATION: Performed by: PSYCHIATRY & NEUROLOGY

## 2024-03-31 PROCEDURE — 99232 SBSQ HOSP IP/OBS MODERATE 35: CPT | Performed by: NURSE PRACTITIONER

## 2024-03-31 PROCEDURE — 70496 CT ANGIOGRAPHY HEAD: CPT

## 2024-03-31 PROCEDURE — 36415 COLL VENOUS BLD VENIPUNCTURE: CPT

## 2024-03-31 PROCEDURE — 70498 CT ANGIOGRAPHY NECK: CPT

## 2024-03-31 PROCEDURE — 93010 ELECTROCARDIOGRAM REPORT: CPT | Performed by: INTERNAL MEDICINE

## 2024-03-31 PROCEDURE — 2100000000 HC CCU R&B

## 2024-03-31 RX ADMIN — DULOXETINE HYDROCHLORIDE 20 MG: 20 CAPSULE, DELAYED RELEASE ORAL at 22:38

## 2024-03-31 RX ADMIN — DEXAMETHASONE SODIUM PHOSPHATE 4 MG: 4 INJECTION, SOLUTION INTRA-ARTICULAR; INTRALESIONAL; INTRAMUSCULAR; INTRAVENOUS; SOFT TISSUE at 17:24

## 2024-03-31 RX ADMIN — GABAPENTIN 600 MG: 300 CAPSULE ORAL at 08:40

## 2024-03-31 RX ADMIN — SODIUM CHLORIDE, PRESERVATIVE FREE 10 ML: 5 INJECTION INTRAVENOUS at 08:40

## 2024-03-31 RX ADMIN — HYDROCODONE BITARTRATE AND ACETAMINOPHEN 1 TABLET: 5; 325 TABLET ORAL at 22:46

## 2024-03-31 RX ADMIN — DEXAMETHASONE SODIUM PHOSPHATE 4 MG: 4 INJECTION, SOLUTION INTRA-ARTICULAR; INTRALESIONAL; INTRAMUSCULAR; INTRAVENOUS; SOFT TISSUE at 22:39

## 2024-03-31 RX ADMIN — SODIUM CHLORIDE, PRESERVATIVE FREE 10 ML: 5 INJECTION INTRAVENOUS at 22:39

## 2024-03-31 RX ADMIN — DEXAMETHASONE SODIUM PHOSPHATE 4 MG: 4 INJECTION, SOLUTION INTRA-ARTICULAR; INTRALESIONAL; INTRAMUSCULAR; INTRAVENOUS; SOFT TISSUE at 05:03

## 2024-03-31 RX ADMIN — DEXAMETHASONE SODIUM PHOSPHATE 4 MG: 4 INJECTION, SOLUTION INTRA-ARTICULAR; INTRALESIONAL; INTRAMUSCULAR; INTRAVENOUS; SOFT TISSUE at 11:20

## 2024-03-31 RX ADMIN — FAMOTIDINE 20 MG: 20 TABLET, FILM COATED ORAL at 22:39

## 2024-03-31 RX ADMIN — HYDROCODONE BITARTRATE AND ACETAMINOPHEN 1 TABLET: 5; 325 TABLET ORAL at 11:20

## 2024-03-31 RX ADMIN — ROPINIROLE HYDROCHLORIDE 1 MG: 1 TABLET, FILM COATED ORAL at 22:38

## 2024-03-31 RX ADMIN — IOPAMIDOL 80 ML: 755 INJECTION, SOLUTION INTRAVENOUS at 13:22

## 2024-03-31 RX ADMIN — FAMOTIDINE 20 MG: 20 TABLET, FILM COATED ORAL at 08:40

## 2024-03-31 RX ADMIN — GABAPENTIN 900 MG: 300 CAPSULE ORAL at 22:38

## 2024-03-31 ASSESSMENT — PAIN DESCRIPTION - LOCATION
LOCATION: HIP
LOCATION: BACK

## 2024-03-31 ASSESSMENT — PAIN DESCRIPTION - DESCRIPTORS
DESCRIPTORS: ACHING
DESCRIPTORS: SORE;DISCOMFORT

## 2024-03-31 ASSESSMENT — PAIN SCALES - GENERAL
PAINLEVEL_OUTOF10: 6
PAINLEVEL_OUTOF10: 9

## 2024-03-31 ASSESSMENT — PAIN - FUNCTIONAL ASSESSMENT: PAIN_FUNCTIONAL_ASSESSMENT: ACTIVITIES ARE NOT PREVENTED

## 2024-03-31 ASSESSMENT — PAIN DESCRIPTION - ORIENTATION
ORIENTATION: RIGHT
ORIENTATION: MID

## 2024-03-31 NOTE — CONSULTS
CRITICAL CARE CONSULT NOTE      Patient:  Alpa Marte    Unit/Bed:3A-03/003-A  YOB: 1955  MRN: [id number]   PCP: Connie Augustin PA  Date of Admission: 3/26/2024  Chief Complaint:-Acute hemorrhage    Assessment and Plan:    Acute hemorrhage in left cerebellar hemisphere: 12 x 7 mm area of acute hemorrhage in left cerebral hemisphere noted on MRI brain without contrast from today.  Neurosurgery is following and recommend ICU transfer.  No trauma or falls while hospitalized.  Repeat CT head without contrast ordered for the morning.  Reexploration and revision of lumbar fusion for extension of the decompression intervention to L3 to pelvis: POD #5. Performed by Dr. Caldera on 3/26/2024.  Secondary to chronic lumbar back pain.  Patient reports having history of 2 lumbar fusions in the past.  Neurosurgery following.  Hemovac removed on 3/29.  Per neurosurgery, plan for possible lumbar drain placement if discharged from surgeon continues.  They recommend holding antiplatelet at this time for possible lumbar drain placement tomorrow.  SCDs placed.  Episodes of staring spells and twitching: EEG was completed on 3/9/2024 which was abnormal suggesting mild to moderate encephalopathy of nonspecific allergy.  No previous history of seizures.  Neurology was consulted on 3/30/2024.  No focal neurological deficits noted.  CT head was ordered on 3/30/2024 which showed indistinct hyperattenuation in the left occipital lobe suspicious for parenchymal hemorrhage.  MRI brain without contrast showed 12 x 7 mm area of acute hemorrhage in the left cerebral hemisphere.  Restless leg syndrome: Continue home Requip.  Depression: Continue home Cymbalta.  Tobacco abuse: Discussed tobacco cessation.  Underweight, malnutrition: BMI 16.59 kg/m2.   Regular alcohol use: Not a drinker.      INITIAL H AND P AND ICU COURSE:  Per initial HPI, \"Alpa Marte is a 68 y.o. female with past medical history of chronic lumbar back pain,  extension of the decompression and instrumentation to L3-Pelvis, surgical procedure done by Dr. Caldera with EBL of 500 cc. From PACU, patient got admitted to stepdown for post-op stated close hemodynamic monitoring, and ICU team got consulted for medical management care.\"  Patient had some staring spells and twitching and CT head was ordered.  MRI brain shows acute hemorrhage.  Neurology neurosurgery following.  Patient transferred back to ICU.     Past Medical History: Per HPI.  Family History: Mother ()-hypertension diabetes.  Social History: Current everyday smoker and alcohol use.    ROS   Patient denies having any headaches, chest pain, abdominal pain, nausea, open no fever chills.    Scheduled Meds:   famotidine  20 mg Oral BID    gabapentin  600 mg Oral QAM    And    gabapentin  900 mg Oral Nightly    rOPINIRole  1 mg Oral Nightly    DULoxetine  20 mg Oral Nightly    sodium chloride flush  5-40 mL IntraVENous 2 times per day    dexAMETHasone  4 mg IntraVENous Q6H     Continuous Infusions:   sodium chloride         PHYSICAL EXAMINATION:  T: 97.7.  P: 82. RR: 16. B/P: 132/60.  O2 Sat: 100% on room air.  I/O:  +440  Body mass index is 16.59 kg/m².   GCS:   15    General:   Elderly female  HEENT:  normocephalic and atraumatic.  No scleral icterus. PERR  Neck: supple.  No Thyromegaly.  Lungs: clear to auscultation.  No retractions  Cardiac: RRR.  No JVD.  Abdomen: soft.  Nontender.  Extremities:  No clubbing, cyanosis, or edema x 4.    Vasculature: capillary refill < 3 seconds. Palpable dorsalis pedis pulses.  Skin:  warm and dry.  Psych:  Alert and oriented x3.  Affect appropriate  Lymph:  No supraclavicular adenopathy.  Neurologic:  No focal deficit. No seizures.    Data: (All radiographs, tracings, PFTs, and imaging are personally viewed and interpreted unless otherwise noted).   3/31/2024 BMP: sodium 133, calcium 3.9, chloride 94, CO2 23, BUN 20, creatinine 0.6, anion gap 16, GFR greater than 90,  to both lower extremities.  Alpa Marte educated about my impression and plan. She verbalizes understanding.    Electronically signed by   Enrrique Rivera MD on 3/31/2024 at 5:37 PM

## 2024-03-31 NOTE — PLAN OF CARE
Problem: Discharge Planning  Goal: Discharge to home or other facility with appropriate resources  Outcome: Progressing  Flowsheets (Taken 3/30/2024 2331)  Discharge to home or other facility with appropriate resources:   Identify barriers to discharge with patient and caregiver   Arrange for needed discharge resources and transportation as appropriate   Identify discharge learning needs (meds, wound care, etc)     Problem: Pain  Goal: Verbalizes/displays adequate comfort level or baseline comfort level  Outcome: Progressing  Flowsheets (Taken 3/30/2024 2331)  Verbalizes/displays adequate comfort level or baseline comfort level:   Encourage patient to monitor pain and request assistance   Assess pain using appropriate pain scale   Administer analgesics based on type and severity of pain and evaluate response   Implement non-pharmacological measures as appropriate and evaluate response     Problem: ABCDS Injury Assessment  Goal: Absence of physical injury  Outcome: Progressing  Flowsheets (Taken 3/30/2024 2331)  Absence of Physical Injury: Implement safety measures based on patient assessment     Problem: Safety - Adult  Goal: Free from fall injury  Outcome: Progressing  Flowsheets (Taken 3/30/2024 2331)  Free From Fall Injury: Instruct family/caregiver on patient safety     Problem: Nutrition Deficit:  Goal: Optimize nutritional status  Outcome: Progressing  Flowsheets (Taken 3/30/2024 2331)  Nutrient intake appropriate for improving, restoring, or maintaining nutritional needs:   Assess nutritional status and recommend course of action   Monitor oral intake, labs, and treatment plans     Problem: Skin/Tissue Integrity  Goal: Absence of new skin breakdown  Description: 1.  Monitor for areas of redness and/or skin breakdown  2.  Assess vascular access sites hourly  3.  Every 4-6 hours minimum:  Change oxygen saturation probe site  4.  Every 4-6 hours:  If on nasal continuous positive airway pressure, respiratory

## 2024-03-31 NOTE — PROGRESS NOTES
Nurse called report to accepting nurse of 3A room # with updates on patient and for arrival. Transported via nurse and tech to clean room with all of her personal belongings.

## 2024-03-31 NOTE — CONSULTS
Drsg applied to great toe to help secure the toenail. Non adhesive drsg and tape, pt educated on application of drsg., understanding voiced. Orthopedic shoe applied to provide protection. Pt carley well. Neurology Consult Note    Date:3/30/2024       Room:[facility]/004-A  Patient Name:Alpa Marte     YOB: 1955     Age:68 y.o.    Requesting Physician: Oscar Walker MD     Reason for Consult:  Evaluate for Twitching/Staring Spells      Chief Complaint: No chief complaint on file.      Subjective     HPI - Taken from H&P- 3/25/24    Alpa Marte is an 68 y.o.  female, an every day smoker tobacco and 1/2 pack day history who admits to regular alcohol use and has a medical history significant for restless leg syndrome and urinary incontinence with a surgical history that includes prior lumbar fusions in 1989 and again in 2003 performed at [facility] resulting in pedicle screw and garima instrumentation at lumbar 4 spanning to S1.  She presents today unaccompanied and ambulating without assistance with complaints of chronic worsening low back pain radiating primarily to the left lower extremity all the way to the foot including chronic weakness and chronic partial left foot drop.  She does not take any medication other than over-the-counter medications and treats with ice and heat and rest.  In the past she has been treated by pain specialist who provided some injection therapies (greater than 20 years prior) that provided only transient relief. A CT scan imaged on 2/21/2024 that reflect pedicle screws from L4-S1 along with laminectomies and a grade 3 anterior listhesis of L5 on S1 unchanged. The flexion-extension x-ray also shows the grade 3 to grade 4 anterior listhesis of L5 on S1 with no evidence of additional change in flexion or extension. SI joint imaging reveals some narrowing along with sclerosis for SI joints bilaterally       Additional History -   Patient is 4 days post op from lumbar decompression and instrumented fusion revision. Continues to have increased drainage as per RN - may have lumbar drain placed.   Neurology contacted for staring spells and some twitching.  lobe suspicious for a parenchymal hemorrhage. This finding was discussed with Dr. Cruz on 3/30/2024 at 2:33 PM. Final report electronically signed by Dr. Akash Carreno on 3/30/2024 2:35 PM       EEG - 3/29/24    Clinical correlation  This EEG was abnormal. Disorganized and slow background suggested mild to moderate encephalopathy of non specific etiology.       Assessment and Plan:        Staring Spells/ Twitching - Patient is alert and oriented. She states that she did have twitching and confusion when she first woke up from anesthesia, but she felt like she was back to her baseline.  Patient was examined by Dr. Ramirez. She has no focal neurological deficits. No suspicion of seizures. She may have had some intermittent confusion related to anesthesia and recent pain med.  Some general slow suggestive of encephalopathy noted per EEG. No suspicion for seizure activity.           CTH - w/wo ordered per primary team - impression - Indistinct hyperattenuation in the left occipital lobe suspicious for a parenchymal         hemorrhage from 1430 - Primary MD - Dr. Cruz notified per note and MRI - brain- W/WO ordered.          2200 - checked with RN on floor regarding CT findings - no change in patient condition. MRI- Brain - W/WO is pending. Instructed RN to       contact MD if any neurological changes.  Dr. Ramirez also aware.     Assessment and plan of care completed with Dr. Ramirez and he is in agreement.    JOSE MANUEL Prince, ANP-BC  Neurology

## 2024-03-31 NOTE — PROGRESS NOTES
Neurosurgery Progress Note    Patient:  Alpa Marte      Unit/Bed:7K-04/004-A    YOB: 1955    MRN: [id number]     Acct: [id number]     Admit date: 3/26/2024    No chief complaint on file.      Patient Seen, Chart, Physician notes, Labs, Radiology studies reviewed.    Subjective: Patient is seen and evaluated on the floor postoperative day 5 from reexploration revision of lumbar fusion for extension of the fusion as performed by Dr. Caldera/neurosurgeon.  Pain was moderately controlled with improvement for lower extremity radiating pain noted.  Patient denied headache on exam today.        Past, Family, Social History unchanged from admission.    Diet:  ADULT ORAL NUTRITION SUPPLEMENT; Breakfast, Lunch, Dinner; Clear Liquid Oral Supplement  ADULT DIET; Easy to Chew  ADULT ORAL NUTRITION SUPPLEMENT; Breakfast, Lunch, Dinner; Other Oral Supplement; Activia and hot beverage    Medications:  Scheduled Meds:   famotidine  20 mg Oral BID    gabapentin  600 mg Oral QAM    And    gabapentin  900 mg Oral Nightly    rOPINIRole  1 mg Oral Nightly    DULoxetine  20 mg Oral Nightly    sodium chloride flush  5-40 mL IntraVENous 2 times per day    dexAMETHasone  4 mg IntraVENous Q6H     Continuous Infusions:   sodium chloride       PRN Meds:ondansetron, labetalol, sodium chloride flush, sodium chloride, morphine **OR** morphine, HYDROcodone 5 mg - acetaminophen, cyclobenzaprine    Objective: Patient observed lying in bed on her left side comfortably as she tolerated a dressing change over a flat incision.  Dressings that were removed were sufficiently saturated with serosanguineous discharge and will be observed with additional dressing changes to be performed as needed.  She is otherwise stable and neurologically intact to her baseline with no additional significant changes aside from significant improvement in lower extremity pain.    Vitals: /62   Pulse 79   Temp 97.8 °F (36.6 °C) (Oral)   Resp 16   Ht  follow      Electronically signed by Santino Stewart PA-C on 3/31/2024 at 12:08 PM    Neurosurgery

## 2024-03-31 NOTE — PROGRESS NOTES
PROGRESS NOTE      Patient:  Alpa Marte  Unit/Bed:7K-04/004-A  YOB: 1955  MRN: [id number]   Acct: [id number]    PCP: Connie Augustin PA    Date of Admission: 3/26/2024 LOS: 5    Date of Evaluation:  3/31/2024    Anticipated Discharge:  pending clinical course    Assessment/Plan:    Reexploration and revision of lumbar instrumented fusion for extension of the decompression and instrumentation to L3 to pelvis on 3/26/2024 by Dr. Caldera  2/2 chronic lumbar back pain  History of lumbar fusions  POD 4  Neurosurgery following and removed Hemovac 3/29.  Per neurosurg, plan for possible lumbar drain placement if discharge from incision continues, will reassess in the morning.    I personally spoke to neurosurgery on 3/30 in regards to DVT prophylaxis and when they recommend restart.  Per Santino from neurosurgery , he states hold anticoagulation for possible lumbar drain placement tomorrow.  SCDs in the meantime  S/p Ancef (3/27 -3/29)  Continue Decadron  Pain medications as needed per neurosurgery  PTOT   IPR following for consideration, pending progress. Pt requires precert if IPR is needed  Cerebellar hemorrhage  MRI brain confirms a 12.7 mm area of acute hemorrhage in left cerebellar hemisphere.  Discussed and updated neurosurgery.  Transferred to ICU, will need repeat imaging in a.m.  Neurology is following  Episodes of staring and twitching   EEG completed on 3/29 and was abnormal suggesting mild to moderate encephalopathy of nonspecific etiology  Patient has no history of seizures  Neurology consulted on 3/30, will follow-up recommendations  Per nurse on 3/30 patient has no further episodes of staring off however does have episodes of confusion.  Patient had visitors evening of 3/29 and state patient was not at her baseline.  On morning of 3/30 patient states she gets confused at times because she has not had much sleep while being in the hospital only getting about 3 to 5 hours.  Will order CT

## 2024-04-01 ENCOUNTER — APPOINTMENT (OUTPATIENT)
Dept: INTERVENTIONAL RADIOLOGY/VASCULAR | Age: 69
End: 2024-04-01
Attending: NEUROLOGICAL SURGERY
Payer: MEDICARE

## 2024-04-01 ENCOUNTER — APPOINTMENT (OUTPATIENT)
Dept: CT IMAGING | Age: 69
End: 2024-04-01
Attending: NEUROLOGICAL SURGERY
Payer: MEDICARE

## 2024-04-01 LAB
ANION GAP SERPL CALC-SCNC: 15 MEQ/L (ref 8–16)
BUN SERPL-MCNC: 22 MG/DL (ref 7–22)
CALCIUM SERPL-MCNC: 9 MG/DL (ref 8.5–10.5)
CHLORIDE SERPL-SCNC: 97 MEQ/L (ref 98–111)
CO2 SERPL-SCNC: 23 MEQ/L (ref 23–33)
CREAT SERPL-MCNC: 0.5 MG/DL (ref 0.4–1.2)
DEPRECATED RDW RBC AUTO: 44.8 FL (ref 35–45)
ERYTHROCYTE [DISTWIDTH] IN BLOOD BY AUTOMATED COUNT: 13 % (ref 11.5–14.5)
GFR SERPL CREATININE-BSD FRML MDRD: > 90 ML/MIN/1.73M2
GLUCOSE BLD STRIP.AUTO-MCNC: 125 MG/DL (ref 70–108)
GLUCOSE SERPL-MCNC: 114 MG/DL (ref 70–108)
HCT VFR BLD AUTO: 36.6 % (ref 37–47)
HGB BLD-MCNC: 13 GM/DL (ref 12–16)
MCH RBC QN AUTO: 33.9 PG (ref 26–33)
MCHC RBC AUTO-ENTMCNC: 35.5 GM/DL (ref 32.2–35.5)
MCV RBC AUTO: 95.6 FL (ref 81–99)
PLATELET # BLD AUTO: 244 THOU/MM3 (ref 130–400)
PMV BLD AUTO: 11.8 FL (ref 9.4–12.4)
POTASSIUM SERPL-SCNC: 4.4 MEQ/L (ref 3.5–5.2)
RBC # BLD AUTO: 3.83 MILL/MM3 (ref 4.2–5.4)
SODIUM SERPL-SCNC: 135 MEQ/L (ref 135–145)
WBC # BLD AUTO: 13.8 THOU/MM3 (ref 4.8–10.8)

## 2024-04-01 PROCEDURE — 97530 THERAPEUTIC ACTIVITIES: CPT

## 2024-04-01 PROCEDURE — 36415 COLL VENOUS BLD VENIPUNCTURE: CPT

## 2024-04-01 PROCEDURE — 6360000002 HC RX W HCPCS: Performed by: PHYSICIAN ASSISTANT

## 2024-04-01 PROCEDURE — 6370000000 HC RX 637 (ALT 250 FOR IP): Performed by: PHYSICIAN ASSISTANT

## 2024-04-01 PROCEDURE — 62329 THER SPI PNXR CSF FLUOR/CT: CPT

## 2024-04-01 PROCEDURE — 92523 SPEECH SOUND LANG COMPREHEN: CPT

## 2024-04-01 PROCEDURE — 82948 REAGENT STRIP/BLOOD GLUCOSE: CPT

## 2024-04-01 PROCEDURE — 2709999900 IR FLUORO GUIDED LUMBAR PUNCTURE THERAPY

## 2024-04-01 PROCEDURE — 2100000000 HC CCU R&B

## 2024-04-01 PROCEDURE — 009U30Z DRAINAGE OF SPINAL CANAL WITH DRAINAGE DEVICE, PERCUTANEOUS APPROACH: ICD-10-PCS | Performed by: RADIOLOGY

## 2024-04-01 PROCEDURE — 6360000002 HC RX W HCPCS: Performed by: RADIOLOGY

## 2024-04-01 PROCEDURE — 2580000003 HC RX 258: Performed by: PHYSICIAN ASSISTANT

## 2024-04-01 PROCEDURE — 6370000000 HC RX 637 (ALT 250 FOR IP): Performed by: NURSE PRACTITIONER

## 2024-04-01 PROCEDURE — 80048 BASIC METABOLIC PNL TOTAL CA: CPT

## 2024-04-01 PROCEDURE — 85027 COMPLETE CBC AUTOMATED: CPT

## 2024-04-01 PROCEDURE — APPSS60 APP SPLIT SHARED TIME 46-60 MINUTES: Performed by: PHYSICIAN ASSISTANT

## 2024-04-01 PROCEDURE — 70450 CT HEAD/BRAIN W/O DYE: CPT

## 2024-04-01 PROCEDURE — 99231 SBSQ HOSP IP/OBS SF/LOW 25: CPT | Performed by: NURSE PRACTITIONER

## 2024-04-01 PROCEDURE — 6370000000 HC RX 637 (ALT 250 FOR IP)

## 2024-04-01 PROCEDURE — 99232 SBSQ HOSP IP/OBS MODERATE 35: CPT | Performed by: INTERNAL MEDICINE

## 2024-04-01 PROCEDURE — 99024 POSTOP FOLLOW-UP VISIT: CPT | Performed by: NEUROLOGICAL SURGERY

## 2024-04-01 PROCEDURE — 009U3ZX DRAINAGE OF SPINAL CANAL, PERCUTANEOUS APPROACH, DIAGNOSTIC: ICD-10-PCS | Performed by: RADIOLOGY

## 2024-04-01 RX ORDER — MIDAZOLAM HYDROCHLORIDE 1 MG/ML
INJECTION INTRAMUSCULAR; INTRAVENOUS PRN
Status: COMPLETED | OUTPATIENT
Start: 2024-04-01 | End: 2024-04-01

## 2024-04-01 RX ORDER — FENTANYL CITRATE 50 UG/ML
INJECTION, SOLUTION INTRAMUSCULAR; INTRAVENOUS PRN
Status: COMPLETED | OUTPATIENT
Start: 2024-04-01 | End: 2024-04-01

## 2024-04-01 RX ORDER — AMLODIPINE BESYLATE 5 MG/1
5 TABLET ORAL DAILY
Status: DISCONTINUED | OUTPATIENT
Start: 2024-04-01 | End: 2024-04-19 | Stop reason: HOSPADM

## 2024-04-01 RX ADMIN — SODIUM CHLORIDE, PRESERVATIVE FREE 10 ML: 5 INJECTION INTRAVENOUS at 21:25

## 2024-04-01 RX ADMIN — FAMOTIDINE 20 MG: 20 TABLET, FILM COATED ORAL at 08:49

## 2024-04-01 RX ADMIN — ROPINIROLE HYDROCHLORIDE 1 MG: 1 TABLET, FILM COATED ORAL at 20:24

## 2024-04-01 RX ADMIN — GABAPENTIN 600 MG: 300 CAPSULE ORAL at 08:52

## 2024-04-01 RX ADMIN — MIDAZOLAM 0.5 MG: 1 INJECTION INTRAMUSCULAR; INTRAVENOUS at 13:40

## 2024-04-01 RX ADMIN — HYDROCODONE BITARTRATE AND ACETAMINOPHEN 1 TABLET: 5; 325 TABLET ORAL at 05:43

## 2024-04-01 RX ADMIN — SODIUM CHLORIDE, PRESERVATIVE FREE 10 ML: 5 INJECTION INTRAVENOUS at 08:49

## 2024-04-01 RX ADMIN — DULOXETINE HYDROCHLORIDE 20 MG: 20 CAPSULE, DELAYED RELEASE ORAL at 20:23

## 2024-04-01 RX ADMIN — FENTANYL CITRATE 25 MCG: 50 INJECTION, SOLUTION INTRAMUSCULAR; INTRAVENOUS at 13:40

## 2024-04-01 RX ADMIN — HYDROCODONE BITARTRATE AND ACETAMINOPHEN 1 TABLET: 5; 325 TABLET ORAL at 20:24

## 2024-04-01 RX ADMIN — DEXAMETHASONE SODIUM PHOSPHATE 4 MG: 4 INJECTION, SOLUTION INTRA-ARTICULAR; INTRALESIONAL; INTRAMUSCULAR; INTRAVENOUS; SOFT TISSUE at 17:36

## 2024-04-01 RX ADMIN — FAMOTIDINE 20 MG: 20 TABLET, FILM COATED ORAL at 20:24

## 2024-04-01 RX ADMIN — MIDAZOLAM 1 MG: 1 INJECTION INTRAMUSCULAR; INTRAVENOUS at 13:30

## 2024-04-01 RX ADMIN — DEXAMETHASONE SODIUM PHOSPHATE 4 MG: 4 INJECTION, SOLUTION INTRA-ARTICULAR; INTRALESIONAL; INTRAMUSCULAR; INTRAVENOUS; SOFT TISSUE at 05:43

## 2024-04-01 RX ADMIN — MORPHINE SULFATE 4 MG: 4 INJECTION, SOLUTION INTRAMUSCULAR; INTRAVENOUS at 21:25

## 2024-04-01 RX ADMIN — DEXAMETHASONE SODIUM PHOSPHATE 4 MG: 4 INJECTION, SOLUTION INTRA-ARTICULAR; INTRALESIONAL; INTRAMUSCULAR; INTRAVENOUS; SOFT TISSUE at 10:59

## 2024-04-01 RX ADMIN — FENTANYL CITRATE 50 MCG: 50 INJECTION, SOLUTION INTRAMUSCULAR; INTRAVENOUS at 13:30

## 2024-04-01 RX ADMIN — AMLODIPINE BESYLATE 5 MG: 5 TABLET ORAL at 08:49

## 2024-04-01 RX ADMIN — GABAPENTIN 900 MG: 300 CAPSULE ORAL at 20:23

## 2024-04-01 RX ADMIN — DEXAMETHASONE SODIUM PHOSPHATE 4 MG: 4 INJECTION, SOLUTION INTRA-ARTICULAR; INTRALESIONAL; INTRAMUSCULAR; INTRAVENOUS; SOFT TISSUE at 21:25

## 2024-04-01 ASSESSMENT — PAIN SCALES - WONG BAKER: WONGBAKER_NUMERICALRESPONSE: HURTS WORST

## 2024-04-01 ASSESSMENT — PAIN SCALES - GENERAL
PAINLEVEL_OUTOF10: 10
PAINLEVEL_OUTOF10: 10
PAINLEVEL_OUTOF10: 6
PAINLEVEL_OUTOF10: 5
PAINLEVEL_OUTOF10: 0
PAINLEVEL_OUTOF10: 10
PAINLEVEL_OUTOF10: 10

## 2024-04-01 ASSESSMENT — PAIN DESCRIPTION - DESCRIPTORS
DESCRIPTORS: SORE;DISCOMFORT
DESCRIPTORS: ACHING

## 2024-04-01 ASSESSMENT — PAIN DESCRIPTION - ORIENTATION
ORIENTATION: LEFT
ORIENTATION: MID
ORIENTATION: LEFT

## 2024-04-01 ASSESSMENT — PAIN DESCRIPTION - LOCATION
LOCATION: HIP
LOCATION: HIP
LOCATION: BACK

## 2024-04-01 NOTE — PROGRESS NOTES
Neurology Progress Note    Date:3/31/2024       Room:[address] [place] [address]  Patient Name:Alpa Marte     YOB: 1955     Age:68 y.o.    CC: No chief complaint on file.       Subjective      HPI - Taken from H&P- 3/25/24     Alpa Marte is an 68 y.o.  female, an every day smoker tobacco and 1/2 pack day history who admits to regular alcohol use and has a medical history significant for restless leg syndrome and urinary incontinence with a surgical history that includes prior lumbar fusions in 1989 and again in 2003 performed at [facility] resulting in pedicle screw and garima instrumentation at lumbar 4 spanning to S1.  She presents today unaccompanied and ambulating without assistance with complaints of chronic worsening low back pain radiating primarily to the left lower extremity all the way to the foot including chronic weakness and chronic partial left foot drop.  She does not take any medication other than over-the-counter medications and treats with ice and heat and rest.  In the past she has been treated by pain specialist who provided some injection therapies (greater than 20 years prior) that provided only transient relief. A CT scan imaged on 2/21/2024 that reflect pedicle screws from L4-S1 along with laminectomies and a grade 3 anterior listhesis of L5 on S1 unchanged. The flexion-extension x-ray also shows the grade 3 to grade 4 anterior listhesis of L5 on S1 with no evidence of additional change in flexion or extension. SI joint imaging reveals some narrowing along with sclerosis for SI joints bilaterally         Additional History -   Patient is 4 days post op from lumbar decompression and instrumented fusion revision. Continues to have increased drainage as per RN - may have lumbar drain placed.   Neurology contacted for staring spells and some twitching. According to the RN, her friends who have visited also felt like she is not at her baseline. She has no focal neurological  We will sign off at this time.      Assessment and plan of care completed with Dr. Ramirez and he is in agreement.    JOSE MANUEL Prince, ANP-BC  Neurology

## 2024-04-01 NOTE — PROGRESS NOTES
[facility]  SPEECH THERAPY  [facility] CCU 3A  Speech - Language - Cognitive Evaluation    SLP Individual Minutes  Time In: 1108  Time Out: 1126  Minutes: 18  Timed Code Treatment Minutes: 0 Minutes       Date: 2024  Patient Name: Alpa Marte      CSN: [id number]   : 1955  (68 y.o.)  Gender: female   Referring Physician:  aDvid Chatman DO   Diagnosis: Adjacent segment disease of lumbar spine with history of fusion procedure  Precautions: Fall risk  History of Present Illness/Injury: Patient admitted to [facility] with above med dx; please refer to physician H&P for full details. Per chart review, \"68 y.o. female with past medical history of chronic lumbar back pain, restless leg syndrome, urinary incontinence, tobacco abuse, regular alcohol use, and arthritis who came in to [facility] for her lumbar surgical intervention with neurosurgery, Dr. Caldera. Chart review reveals that patient had surgical history significant for lumbar fusions in  and again in  done at [facility] resulting in pedicle screw and garima instrumentation at lumbar 4 spanning to S1. Reported that patient was recently seen and evaluated by Dr. Caldera as a referral on 1/15/2024. Her complaint was significant to worsening radiating lumbar back pain with urinary incontinence, and her appointment includes new imaging studies in the form of flexion-extension x-ray of the lumbar spine to rule out gross instability along with a CT scan of the lumbar spine for surgical planning. Patient was seen again at Dr. Caldera's office with complaints of chronic worsening low back pain radiating primarily to the left lower extremity all the way to the foot including chronic weakness and chronic partial left foot drop. She does not take any medication other than over-the-counter medications and treats with ice and heat and rest.  In the past she has been treated by pain specialist who provided some injection therapies (greater than  hyperverbal speech tendencies throughout with re-directions required. Absence for family at bedside.     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   Hobbies: Gardening, reading  Vision Status: Reading glasses  Hearing: WFL  Type of Home: Trailer  Home Layout: One level  Home Access: Stairs to enter with rails  Home Equipment: Cane, Walker, rolling    SPEECH / VOICE:  Speech and Voice appear to be grossly intact for basic and complex daily communication    LANGUAGE:  Receptive and Expressive:  Receptive and expressive language skills appear to be grossly intact for basic and complex daily communication. No apparent communicative breakdowns at dialogue level with patient successful for conveying desired message. Independent executions of multi-step commands throughout evaluation.     COGNITION:  [place] Cognitive Assessment (MOCA) version 7.1 completed.  Patient scored 18/30.  Normal is greater than or equal to 26/30.  *Inclusion of +1 point given highest level of education achieved less than/equal to 12th grade or GED with limited-0 post-secondary schooling   Orientation:   Immediate Recall: T1 25; T2, /5  Short-Term Recall: 2  Divergent Namin/11 members  Problem Solving: 3/3  Reasonin/2  Sequencin/1  Thought Organization: Impaired  Insight: Concerns for functional implications  Attention: Higher level deficits  Math Computation: 1/3 serial subtraction  Executive Functioning: 3/5    SWALLOWING:  Current Diet: NPO. Inability to complete clinical swallow evaluation at date d/t planned neurosurgical intervention.     RECOMMENDATIONS/ASSESSMENT:  DIAGNOSTIC IMPRESSIONS:  Patient presents

## 2024-04-01 NOTE — PROGRESS NOTES
1310 Patient received in IR for lumbar drain placement.   1315 This procedure has been fully reviewed with the patient and written informed consent has been obtained.  1333 Procedure started with Dr. Fernandez.   1348 10cm Unifuse catheter inserted at L4 with the tip at T10.   1349 Procedure completed; patient tolerated well. Dressing to exit site; no bleeding noted.  1357 Report called to Delores RAMIREZ on 3A.   1358 Patient on bed; comfort ensured.  1401 Patient taken to 3A via transport and Delores RAMIREZ.

## 2024-04-01 NOTE — OP NOTE
Department of Radiology  Post Procedure Progress Note      Pre-Procedure Diagnosis:  post op CSF leak     Procedure Performed:  lumbar CSF drain inserted     Anesthesia: local / versed and fentanyl    Findings: successful    Immediate Complications:  None    Estimated Blood Loss: minimal    SEE DICTATED PROCEDURE NOTE FOR COMPLETE DETAILS.    Umesh Fernandez MD   4/1/2024 1:52 PM

## 2024-04-01 NOTE — PROGRESS NOTES
[facility]  PHYSICAL THERAPY MISSED TREATMENT NOTE  [facility] CCU 3A    Date: 2024  Patient Name: Alpa Marte        MRN: [id number]   : 1955  (68 y.o.)  Gender: female   Referring Practitioner: Santino Stewart PA-C  Diagnosis: Adjacent segment disease of lumbar spine with history of fusion procedure         REASON FOR MISSED TREATMENT:  Patient on hold per nursing. Lumbar drain to be placed. PT to reassess

## 2024-04-01 NOTE — PLAN OF CARE
Problem: Discharge Planning  Goal: Discharge to home or other facility with appropriate resources  Outcome: Progressing  Flowsheets (Taken 4/1/2024 1712)  Discharge to home or other facility with appropriate resources:   Identify barriers to discharge with patient and caregiver   Arrange for needed discharge resources and transportation as appropriate   Identify discharge learning needs (meds, wound care, etc)   Refer to discharge planning if patient needs post-hospital services based on physician order or complex needs related to functional status, cognitive ability or social support system     Problem: Pain  Goal: Verbalizes/displays adequate comfort level or baseline comfort level  Outcome: Progressing  Flowsheets (Taken 4/1/2024 1712)  Verbalizes/displays adequate comfort level or baseline comfort level:   Encourage patient to monitor pain and request assistance   Assess pain using appropriate pain scale   Administer analgesics based on type and severity of pain and evaluate response   Implement non-pharmacological measures as appropriate and evaluate response   Consider cultural and social influences on pain and pain management   Notify Licensed Independent Practitioner if interventions unsuccessful or patient reports new pain     Problem: ABCDS Injury Assessment  Goal: Absence of physical injury  Outcome: Progressing  Flowsheets (Taken 4/1/2024 1712)  Absence of Physical Injury: Implement safety measures based on patient assessment     Problem: Safety - Adult  Goal: Free from fall injury  Outcome: Progressing  Flowsheets (Taken 4/1/2024 1712)  Free From Fall Injury:   Instruct family/caregiver on patient safety   Based on caregiver fall risk screen, instruct family/caregiver to ask for assistance with transferring infant if caregiver noted to have fall risk factors     Problem: Nutrition Deficit:  Goal: Optimize nutritional status  Outcome: Progressing  Flowsheets (Taken 4/1/2024 1712)  Nutrient intake  therapy/occupational therapy consults as needed   Instruct patient/family in ordered activity level     Problem: Musculoskeletal - Adult  Goal: Return ADL status to a safe level of function  Outcome: Progressing  Flowsheets (Taken 4/1/2024 1712)  Return ADL Status to a Safe Level of Function:   Administer medication as ordered   Obtain physical therapy/occupational therapy consults as needed     Problem: Gastrointestinal - Adult  Goal: Maintains or returns to baseline bowel function  Outcome: Progressing  Flowsheets (Taken 4/1/2024 1712)  Maintains or returns to baseline bowel function:   Assess bowel function   Encourage mobilization and activity   Encourage oral fluids to ensure adequate hydration     Problem: Neurosensory - Adult  Goal: Achieves stable or improved neurological status  Outcome: Progressing  Flowsheets (Taken 4/1/2024 1712)  Achieves stable or improved neurological status:   Assess for and report changes in neurological status   Monitor temperature, glucose, and sodium. Initiate appropriate interventions as ordered     Problem: Neurosensory - Adult  Goal: Achieves maximal functionality and self care  Outcome: Progressing  Flowsheets (Taken 4/1/2024 1712)  Achieves maximal functionality and self care:   Encourage and assist patient to increase activity and self care with guidance from physical therapy/occupational therapy   Monitor swallowing and airway patency with patient fatigue and changes in neurological status     Care plan reviewed with patient and family.  Patient and family verbalize understanding of the plan of care and contribute to goal setting.

## 2024-04-01 NOTE — PROGRESS NOTES
Stroke Folder given.   What is Stroke/CVA  Signs and Symptoms of stroke (BEFAST)  Treatments for Stroke  Personal Risk Factors for Stroke discussed  Education--Call 911      Patient/family has been educated on their personal risk factors of:  smoking cessation      They have been given hand outs on the following medications:    Treatment for stroke includes:  Risk factor modifications  Following the medication regime prescribed by physician      Educated on FAST-Face-Arm-Speech-Time    A stroke is a brain attack.Stroke is a brain injury. It occurs when the brain's blood supply is interrupted. Blood carries oxygen and nutrients to the brain. Without oxygen and nutrients from blood, brain tissue starts to die rapidly. This can happen in less than 10 minutes.    A stroke occurs when blood flow to the brain is blocked (called ischemic stroke). This is caused by one of the following:   Sudden decreased blood flow   Damage to a blood vessel supplying blood to the brain can occur suddenly from either:   Injury   A clot that forms and breaks off from another part of the body (such as the heart or neck)   There are certain conditions which predispose people to form blood clots, such as:   Cancer   Pregnancy   Atrial fibrillation   Certain autoimmune diseases   Local blood clot   A build-up of fatty substances ( atherosclerotic plaque ) along the inner lining of the artery causes:   Narrowing of artery   Reduced elasticity   Local inflammation   Blood protein defects leading to increased clotting tendency   Decreased blood flow in the artery   Clot in an artery supplying the brain   Inflammatory conditions in the blood vessels (vasculitis)   A stroke may also occur if a blood vessel breaks and bleeds into or around the brain. This is called hemorrhagic stroke.      This condition needs to be monitored closely. Be sure to keep all appointments. Have exams and blood tests done as directed.     Call 911 If Any of the Following

## 2024-04-01 NOTE — PROGRESS NOTES
CRITICAL CARE PROGRESS NOTE      Patient:   Alpa Marte    Unit/Bed: 3A-03/003-A  YOB: 1955  MRN:  [id number]   PCP:  Connie Augustin PA  Date of Admission:  3/26/2024    Chief Complaint:  Acute hemorrhage    Assessment and Plan:  Acute left cerebellar hemorrhage: On 3/31, 12 x 7 mm area of acute hemorrhage left cerebellar hemisphere, with bilateral tentorium cerebri hemorrhage noted on MRI.  Neurology signed off, defer to neurosurgery.  Neurosurgery consulted, following, recommend no acute intervention.  PT OT  Up in chair as tolerated  Wound VAC removed 3/29.  Holding anticoagulation/antiplatelet.  S/p lumbar fusion L3-sacrum: POD 6.  Similar to prior procedures in 99, 2003 at [facility].  Status post wound VAC.  Was some concern for CSF leak.  Neurosurgery continues to follow  Monitor   Hypertensive urgency: On no home antihypertensives.  As needed labetalol since 3/26, continues to have uncontrolled blood pressure.  Labetalol 5 mg IV q4h prn  Begin amlodipine 5 mg po qd  Restless leg syndrome: Continue home Requip  Depression: Continue home Cymbalta  Tobacco abuse: Discussed tobacco cessation  Malnutrition: BMI 16.59    INITIAL H&P AND COURSE:  Patient has history of chronic lumbar back pain, has had previous surgery and lumbar fusions in 19 9, 2003 at [facility].  Most recently had similar procedure done by Dr. Caldera on 3/26.  Patient developed episodes of staring spells and twitching.  On 3/30, had CT head ordered which showed indistinct hyperattenuation left occipital lobe suspicious for parenchymal hemorrhage.  Follow-up MRI then showed 12 x 7 mm area of acute hemorrhage in left cerebellar hemisphere.    Past 24 Hr Progress:  Patient was doing well over the past day.  She has had good energy over the past day, and has no focal deficits.  She is able to move around her upper and lower extremities.  She had questions regarding drain.  Patient went to have lumbar drain placed  high      A system downtime occurred on November 7, 2010 at 8374-9449 (60 minutes) due to Daylight Savings Eastern Standard Time.   The data collected during this time frame was recorded on a progress note and scanned into the patient's electronic medical record.       Meets Continued ICU Level Care Criteria:    [x] Yes   [] No - Transfer Planned to listed location:  [] HOSPITALIST CONTACTED-      Case and plan discussed with Dr. Brannon Castaneda MD.        Electronically signed by David Chatman DO, IM PGY-1  CRITICAL CARE SPECIALIST  Patient seen by me including key components of medical care.  Case discussed with resident physician. Ok for lower level care.  Can transfer to medical service.  Italicized font, if present,  represents changes to the note made by me.  Time was discontiguous. Time does not include procedure. Time does include my direct assessment of the patient and coordination of care.  Time represents more than 50% of the time involved with patient care by the CC team.  Electronically signed by Brannon Castaneda MD.

## 2024-04-01 NOTE — CARE COORDINATION
Handoff report given to Ena RAMIREZ, CM with pt tx [id number] to 3A03. Neurology consulted for staring spells and some twitching.

## 2024-04-01 NOTE — H&P
Formulation and discussion of sedation / procedure plans, risks, benefits, side effects and alternatives with patient and/or responsible adult completed.    History and Physical reviewed and unchanged.    Electronically signed by Umesh Fernandez MD on 4/1/24 at 1:26 PM EDT

## 2024-04-01 NOTE — CARE COORDINATION
4/1/24, 2:26 PM EDT    DISCHARGE ON GOING EVALUATION    [facility] day: 6  Location: 3A-03/003-A Reason for admit: Adjacent segment disease of lumbar spine with history of fusion procedure [M51.36, Z98.1]  S/P lumbar fusion [Z98.1]   Procedure:   3/26 Reexploration and Revision of Lumbar Instrumented Fusion for Extension of the Decompression and Instrumentation to L3-Pelvis    3/29 lumbar drain removed  3/31 CTA head and neck:   1. There is plaque involving the left carotid bifurcation. There is no significant hemodynamic stenosis in the left common and internal carotid arteries.  2. There is no significant hemodynamic stenosis in the right common and internal carotid arteries.  3. There is antegrade flow in the right and left vertebral arteries.  4. There is plaque in the proximal left subclavian artery. There is atherosclerotic calcification in the aortic arch.  5. There is atherosclerotic calcification in the cavernous segments of both internal carotid arteries. There is no evidence of severe stenosis.  6. The right and left superior cerebellar arteries are not clearly seen.  7. Otherwise negative CTA of the head and neck.  3/31 MRI brain:   1. 12 x 7 mm area of acute hemorrhage in the left cerebellar hemisphere.  2. There is hemorrhage along the tentorium cerebelli bilaterally.  3. There is a small area of susceptibility artifact in the subdural space over the right frontal lobe.   4. There is mild atrophy and probable ischemic changes in the white matter. There is no evidence for an acute infarct.  5. There are inflammatory changes in ethmoid air cells and mastoid air cells bilaterally and in the left maxillary sinus.     4/1 lumbar CSF drain inserted    Barriers to Discharge: Transferred from  on 3/31 for acute left cerebellar hemorrhage. Intensivist and N/S following. PMR following. PT/OT/SLP. IV decadron. New amlodipine. Receiving prn labetalol.   PCP: Connie Augustin PA  Readmission  Risk Score: 8.5%  Patient Goals/Plan/Treatment Preferences: PMR following for possible IPR admission when medically stable. Will require precert if accepted.

## 2024-04-01 NOTE — H&P
[facility]  Sedation/Analgesia History & Physical    Pt Name: Alpa Marte  MRN: [id number]  YOB: 1955  Provider Performing Procedure: Umesh Fernandez MD, MD  Primary Care Physician: Connie Augustin PA    Formulation and discussion of sedation / procedure plans, risks, benefits, side effects and alternatives with patient and/or responsible adult completed.    PRE-PROCEDURE   DNR-CCA/DNR-CC []Yes [x]No  Brief History/Pre-Procedure Diagnosis: post op CSF leak           MEDICAL HISTORY  []CAD/Valve  []Liver Disease  []Lung Disease []Diabetes  []Hypertension []Renal Disease  []Additional information:       has a past medical history of Arthritis and Restless leg syndrome.    SURGICAL HISTORY   has a past surgical history that includes Appendectomy (1978); lumbar fusion (1998); lumbar fusion (2003); Cholecystectomy (2006); Wrist surgery (Left, 2004); and lumbar fusion (N/A, 3/26/2024).  Additional information:       ALLERGIES   Allergies as of 03/11/2024    (No Known Allergies)     Additional information:       MEDICATIONS   Coumadin Use Last 5 Days [x]No []Yes  Antiplatelet drug therapy use last 5 days  [x]No []Yes  Other anticoagulant use last 5 days  [x]No []Yes    Current Facility-Administered Medications:     amLODIPine (NORVASC) tablet 5 mg, 5 mg, Oral, Daily, David Chatman, DO, 5 mg at 04/01/24 0849    famotidine (PEPCID) tablet 20 mg, 20 mg, Oral, BID, Lassiter, Kelly, APRN - CNP, 20 mg at 04/01/24 0849    ondansetron (ZOFRAN) injection 4 mg, 4 mg, IntraVENous, Q6H PRN, Lassiter, Kelly, APRN - CNP, 4 mg at 03/30/24 0813    labetalol (NORMODYNE;TRANDATE) injection 5 mg, 5 mg, IntraVENous, Q4H PRN, Lassiter, Kelly, APRN - CNP, 5 mg at 03/28/24 0035    gabapentin (NEURONTIN) capsule 600 mg, 600 mg, Oral, QAM, 600 mg at 04/01/24 0852 **AND** gabapentin (NEURONTIN) capsule 900 mg, 900 mg, Oral, Nightly, Karambellas, Santino, PA-C, 900 mg at 03/31/24 4800    rOPINIRole (REQUIP) tablet 1  mg, 1 mg, Oral, Nightly, Santino Stewart PA-C, 1 mg at 03/31/24 2238    DULoxetine (CYMBALTA) extended release capsule 20 mg, 20 mg, Oral, Nightly, Santino Stewart PA-C, 20 mg at 03/31/24 2238    sodium chloride flush 0.9 % injection 5-40 mL, 5-40 mL, IntraVENous, 2 times per day, Santino Stewart PA-C, 10 mL at 04/01/24 0849    sodium chloride flush 0.9 % injection 5-40 mL, 5-40 mL, IntraVENous, PRN, Santino Stewart PA-C, 10 mL at 03/30/24 1815    0.9 % sodium chloride infusion, , IntraVENous, PRN, Santino Stewart PA-C    morphine (PF) injection 2 mg, 2 mg, IntraVENous, Q2H PRN, 2 mg at 03/28/24 0409 **OR** morphine injection 4 mg, 4 mg, IntraVENous, Q2H PRN, Santino Stewart PA-C, 4 mg at 03/28/24 0152    HYDROcodone-acetaminophen (NORCO) 5-325 MG per tablet 1 tablet, 1 tablet, Oral, Q4H PRN, Santino Stewart PA-C, 1 tablet at 04/01/24 0543    cyclobenzaprine (FLEXERIL) tablet 10 mg, 10 mg, Oral, Q12H PRN, Santino Stewart PA-C, 10 mg at 03/30/24 0814    dexAMETHasone (DECADRON) injection 4 mg, 4 mg, IntraVENous, Q6H, Santino Stewart PA-C, 4 mg at 04/01/24 1059  Prior to Admission medications    Medication Sig Start Date End Date Taking? Authorizing Provider   DULoxetine (CYMBALTA) 20 MG extended release capsule Take 1 capsule by mouth at bedtime 2/20/24   Ruba Kerns MD   gabapentin (NEURONTIN) 300 MG capsule Take 2 capsules by mouth in the morning and 3 capsules by mouth in the evening. 5/6/21   Ruba Kerns MD   rOPINIRole (REQUIP) 1 MG tablet Take 1 tablet by mouth nightly 9/16/21   Ruba Kerns MD   Multiple Vitamin (MULTI-VITAMINS) TABS Take 1 tablet by mouth daily    Provider, MD Ruba   folic acid (FOLVITE) 1 MG tablet Take 1 tablet by mouth daily    Provider, MD Ruba   vitamin D (CHOLECALCIFEROL) 125 MCG (5000 UT) CAPS capsule Take 1 capsule by mouth daily    ProviderRuba MD   B Complex CAPS Take 1 capsule by mouth

## 2024-04-01 NOTE — PROGRESS NOTES
Attending Note:     Patient was seen and examined by me in floor in conjunction with neurosurgery PA (Santino tSewart PA-C).  Discussed with patient, her nurse as well.  All data and imaging reviewed by myself. I agree with examination assessment and plan as documented below.     Patient reported improvement in her back pain however her wound dressing is saturated again with the concern about CSF leak and given patient intraoperative findings.  Plan for lumbar drain replacement today the IR.  After lumbar drain placement please drain 10 to 15 cc/h (based on patient tolerance).  Based on patient respond to the lumbar drain we will make the decision regarding reexploration of her wound for possible dural laceration and CSF leak repair.   Neurosurgery will follow.     Charlie Caldera MD       Neurosurgery Progress Note    Patient:  Alpa Marte      Unit/Bed:3A-03/003-A    YOB: 1955    MRN: [id number]     Acct: [id number]     Admit date: 3/26/2024    No chief complaint on file.      Patient Seen, Chart, Physician notes, Labs, Radiology studies reviewed.    Subjective: Patient is seen and evaluated on 3A having transitioned from the floor without incident.  She remains postoperative day 6 from reexploration and revision of lumbar fusion for extension as performed by Dr. Caldera/neurosurgeon.  Patient denied headache with incisional site discomfort noted along with improvement for radiating low back pain.        Past, Family, Social History unchanged from admission.    Diet:  ADULT ORAL NUTRITION SUPPLEMENT; Breakfast, Lunch, Dinner; Clear Liquid Oral Supplement  ADULT DIET; Easy to Chew  ADULT ORAL NUTRITION SUPPLEMENT; Breakfast, Lunch, Dinner; Other Oral Supplement; Activia and hot beverage    Medications:  Scheduled Meds:   famotidine  20 mg Oral BID    gabapentin  600 mg Oral QAM    And    gabapentin  900 mg Oral Nightly    rOPINIRole  1 mg Oral Nightly    DULoxetine  20 mg Oral Nightly    sodium  from Last 3 Encounters:   03/31/24 45.2 kg (99 lb 10.9 oz)   03/11/24 48.6 kg (107 lb 2.3 oz)   01/15/24 48.6 kg (107 lb 3.2 oz)         CBC:   Recent Labs     03/30/24  0528 03/31/24  0512 04/01/24  0635   WBC 18.5* 14.8* 13.8*   HGB 13.1 12.7 13.0    241 244     BMP:    Recent Labs     03/30/24  0528 03/31/24  0512    133*   K 4.8 3.9   CL 97* 94*   CO2 26 23   BUN 20 20   CREATININE 0.5 0.6   GLUCOSE 115* 116*     Calcium:  Recent Labs     03/31/24 0512   CALCIUM 9.1     Magnesium:No results for input(s): \"MG\" in the last 72 hours.  Glucose:No results for input(s): \"POCGLU\" in the last 72 hours.  HgbA1C: No results for input(s): \"LABA1C\" in the last 72 hours.  Lipids: No results for input(s): \"CHOL\", \"TRIG\", \"HDL\", \"LDL\", \"LDLCALC\" in the last 72 hours.    Radiology reports as per the Radiologist  Radiology: XR LUMBAR SPINE (2-3 VIEWS)    Result Date: 3/27/2024  PROCEDURE: XR LUMBAR SPINE (2-3 VIEWS) CLINICAL INFORMATION: Reexploration and Revision of Lumbar Instrumented Fusion for Extension of the Decompression and Instrumentation to L3-Pelvis (Head), COMPARISON: No prior study. TECHNIQUE: AP and lateral views of the lumbar spine were obtained intraoperatively. FINDINGS: There are bilateral pedicle screws in L3, L4, L5, S1 and traversing both sacroiliac joints. The hardware appears intact.      Intraoperative images for level localization. **This report has been created using voice recognition software. It may contain minor errors which are inherent in voice recognition technology.** Final report electronically signed by Dr. Luz Marina Robison on 3/27/2024 11:52 AM    FLUORO FOR SURGICAL PROCEDURES    Result Date: 3/26/2024  Radiology exam is complete. No Radiologist dictation. Please follow up with ordering provider.                    Assessment: Status postoperative day 6 from reexploration and revision of lumbar fusion for extension of the fusion as performed by Dr. Caldera/neurosurgeon.    Principal

## 2024-04-01 NOTE — PROGRESS NOTES
Physical Medicine & Rehabilitation   Progress Note    Chief Complaint:  lumbar surgery with dura tear. Rehab needs    History of Present Illness:  Alpa Marte is a 68 y.o. female admitted to [facility] on 3/26/2024. Patient  has a past medical history of Arthritis and Restless leg syndrome.  Patient presented to [facility] for planned lumbar intervention. patient had lumbar fusion in 1989 and again in 2003 done at [facility] resulting in pedicle screw and garima instrumentation at lumbar 4 spanning to S1. Patient presented to Dr Caldera for recurring lumbar pain with radicular pain into the left leg. Patient noted left leg weakness and partial left foot drop. CT scan imaged on 2/21/2024 that reflect pedicle screws from L4-S1 along with laminectomies and a grade 3 anterior listhesis of L5 on S1 unchanged. The flexion-extension x-ray also shows the grade 3 to grade 4 anterior listhesis of L5 on S1 with no evidence of additional change in flexion or extension. SI joint imaging revealed narrowing along with sclerosis for SI joints bilaterally.  JHON lumbar spine indicated \"Previous lumbar spine fusion from L4 through S1.  Grade 2 spondylolisthesis with spondylolysis of L5 on S1.  Disc and osteophytes narrow the neural foramina throughout the lumbar region as discussed in detail above.  No significant stenosis of the thecal sac in the lumbar region. \"  Patient elected to move forward with lumbar revision. Patient underwent Reexploration and Revision of Lumbar Instrumented Fusion for Extension of the Decompression and Instrumentation to L3-Pelvis with Dr Caldera on 3/26/24. Patient with intra-op dura leak that was repaired. Patient was placed on bedrest POD #1. Patient participated with PT 3/28 but with complaints of throbbing neck pain with movement and trouble tolerating.     3/28/24: Patient seen today in consult. Patient was just transferred from  to [facility].  Patient is resting in bed. Patient  for IPR.       JOSE MANUEL Grier - SAMANTHA

## 2024-04-01 NOTE — PROGRESS NOTES
Pt returned from IR with lumbar drain in place and dressing clean, dry, and intact. Within an hour of returning dressing became fully saturated with serosanguinous fluid. Saturating the chucks beneath. IR called to come assess.    IR assessed site and changed dressing. Continuous leaking noted at the site. Dressing was replaced and became saturated again. IR nurses will notify Dr. Fernandez. Instructed to continue to monitor for now. Santino Stewart with Neurosurgery notified of continuous leaking at the site. No new orders at this time.

## 2024-04-01 NOTE — PROGRESS NOTES
Physician Progress Note      PATIENT:               FAYE MENESES  CSN #:                  [id number]  :                       1955  ADMIT DATE:       3/26/2024 7:58 AM  DISCH DATE:  RESPONDING  PROVIDER #:        Enrrique Rivera MD          QUERY TEXT:    Dr Caldera, Dr Walker, Dr Rivera,    Pt admitted for spinal procedure. Following extubation 3/26 pt weaned from 8L   Simple mask to current continuous rate 3/28 of 2LNC to maintain SPO2 >94%. No   COPD/DARIA history. Please document in progress notes and discharge summary if   you are evaluating/treating any of the following:    The medical record reflects the following:  Risk Factors: extubated 3/26 2026  Clinical Indicators: reexploration and revision of her lumbar instrumented   fusion for extension of the decompression and instrumentation to L3 and of the   pelvis  Treatment: Titration of 02 from 8LSM to 2LNC continuous 02    Teena Bhat BSN, RN  Options provided:  -- Acute Postoperative Pulmonary Insufficiency  -- Hypoxia.  -- Acute Postoperative Pulmonary Insufficiency ruled out  -- Other - I will add my own diagnosis  -- Disagree - Not applicable / Not valid  -- Disagree - Clinically unable to determine / Unknown  -- Refer to Clinical Documentation Reviewer    PROVIDER RESPONSE TEXT:    Patient has acute postoperative pulmonary insufficiency.    Query created by: Trupti Bhat on 2024 6:24 AM      Electronically signed by:  Enrrique Rivera MD 2024 9:57 AM

## 2024-04-01 NOTE — PROGRESS NOTES
[facility]  STRZ CCU 3A  Occupational Therapy  Daily Note  Time:   Time In: 08  Time Out: 0908  Timed Code Treatment Minutes: 19 Minutes  Minutes: 19          Date: 2024  Patient Name: Alpa Marte,   Gender: female      Room: [facility]03/003-A  MRN: [id number]  : 1955  (68 y.o.)  Referring Practitioner: Santino Stewart PA-C  Diagnosis: Adjacent segment disease of lumbar spine with history of fusion procedure  Additional Pertinent Hx: Per MD H & P:68 y.o.  female, an every day smoker tobacco and 1/2 pack day history who admits to regular alcohol use and has a medical history significant for restless leg syndrome and urinary incontinence with a surgical history that includes prior lumbar fusions in  and again in  performed at [facility] resulting in pedicle screw and garima instrumentation at lumbar 4 spanning to S1.  She presents today unaccompanied and ambulating without assistance with complaints of chronic worsening low back pain radiating primarily to the left lower extremity all the way to the foot including chronic weakness and chronic partial left foot drop.  She does not take any medication other than over-the-counter medications and treats with ice and heat and rest.  In the past she has been treated by pain specialist who provided some injection therapies (greater than 20 years prior) that provided only transient relief. A CT scan imaged on 2024 that reflect pedicle screws from L4-S1 along with laminectomies and a grade 3 anterior listhesis of L5 on S1 unchanged. The flexion-extension x-ray also shows the grade 3 to grade 4 anterior listhesis of L5 on S1 with no evidence of additional change in flexion or extension. SI joint imaging reveals some narrowing along with sclerosis for SI joints bilaterally.  s/p Reexploration and Revision of Lumbar Instrumented Fusion for Extension of the Decompression and Instrumentation to L3-Pelvis on 3/26. CT 3/30  complete LB dressing with LHAE PRN and SBA to increase occupational preformance in home environment.  Short Term Goal 4: Pt will complete UB dressing with SBA and min vcs for technique to increase indep with don/doff back brace.  Short Term Goal 5: Pt will verbalize 3 spinal precautions Mod Indep to increase safety with ADL routine.  Additional Goals?: No    Following session, patient left in safe position with all fall risk precautions in place.

## 2024-04-01 NOTE — PROGRESS NOTES
Rounded on unit, spoke with primary RN Delores, who states that the patient is to have a lumbar drain placed today.  Will continue to follow patient clinical and therapy progression throughout the hospital stay.

## 2024-04-02 PROBLEM — G03.9 MENINGITIS: Status: ACTIVE | Noted: 2024-04-02

## 2024-04-02 PROBLEM — E43 SEVERE MALNUTRITION (HCC): Chronic | Status: ACTIVE | Noted: 2024-04-02

## 2024-04-02 LAB
ACB COMPLEX DNA BLD POS QL NAA+NON-PROBE: NOT DETECTED
ANION GAP SERPL CALC-SCNC: 12 MEQ/L (ref 8–16)
ANISOCYTOSIS BLD QL SMEAR: PRESENT
ANISOCYTOSIS BLD QL SMEAR: PRESENT
AUTO DIFF PNL BLD: ABNORMAL
B FRAGILIS DNA BLD POS QL NAA+NON-PROBE: NOT DETECTED
BASOPHILS ABSOLUTE: 0.1 THOU/MM3 (ref 0–0.1)
BASOPHILS ABSOLUTE: 0.2 THOU/MM3 (ref 0–0.1)
BASOPHILS NFR BLD AUTO: 0.3 %
BASOPHILS NFR BLD AUTO: 0.4 %
BLACTX-M ISLT/SPM QL: NOT DETECTED
BLAIMP ISLT/SPM QL: NOT DETECTED
BLAKPC ISLT/SPM QL: NOT DETECTED
BLAOXA-48-LIKE ISLT/SPM QL: NOT DETECTED
BLAVIM ISLT/SPM QL: NOT DETECTED
BOTTLE TYPE: ABNORMAL
BUN SERPL-MCNC: 22 MG/DL (ref 7–22)
C ALBICANS DNA BLD POS QL NAA+NON-PROBE: NOT DETECTED
C AURIS DNA BLD POS QL NAA+NON-PROBE: NOT DETECTED
C GATTII+NEOFOR DNA BLD POS QL NAA+N-PRB: NOT DETECTED
C GLABRATA DNA BLD POS QL NAA+NON-PROBE: NOT DETECTED
C KRUSEI DNA BLD POS QL NAA+NON-PROBE: NOT DETECTED
C PARAP DNA BLD POS QL NAA+NON-PROBE: NOT DETECTED
C TROPICLS DNA BLD POS QL NAA+NON-PROBE: NOT DETECTED
CALCIUM SERPL-MCNC: 9.3 MG/DL (ref 8.5–10.5)
CHLORIDE SERPL-SCNC: 95 MEQ/L (ref 98–111)
CMV DNA CSF QL NAA+PROBE: NOT DETECTED
CO2 SERPL-SCNC: 23 MEQ/L (ref 23–33)
COAG NEG STAPH DNA BLD QL NAA+PROBE: NOT DETECTED
COLISTIN RES MCR-1 ISLT/SPM QL: NOT DETECTED
CREAT SERPL-MCNC: 0.5 MG/DL (ref 0.4–1.2)
CRYPTOC AG CSF QL: NOT DETECTED
DEPRECATED RDW RBC AUTO: 44 FL (ref 35–45)
DEPRECATED RDW RBC AUTO: 46 FL (ref 35–45)
E CLOAC COMP DNA BLD POS NAA+NON-PROBE: NOT DETECTED
E COLI DNA BLD POS QL NAA+NON-PROBE: DETECTED
E COLI K1 AG CSF QL: DETECTED
E FAECALIS DNA BLD POS QL NAA+NON-PROBE: NOT DETECTED
E FAECIUM DNA BLD POS QL NAA+NON-PROBE: NOT DETECTED
ENTEROBACTERALES DNA BLD POS NAA+N-PRB: DETECTED
EOSINOPHIL NFR BLD AUTO: 0 %
EOSINOPHIL NFR BLD AUTO: 0 %
EOSINOPHILS ABSOLUTE: 0 THOU/MM3 (ref 0–0.4)
EOSINOPHILS ABSOLUTE: 0 THOU/MM3 (ref 0–0.4)
ERYTHROCYTE [DISTWIDTH] IN BLOOD BY AUTOMATED COUNT: 12.9 % (ref 11.5–14.5)
ERYTHROCYTE [DISTWIDTH] IN BLOOD BY AUTOMATED COUNT: 12.9 % (ref 11.5–14.5)
EV RNA CSF QL NAA+PROBE: NOT DETECTED
GFR SERPL CREATININE-BSD FRML MDRD: > 90 ML/MIN/1.73M2
GLUCOSE BLD STRIP.AUTO-MCNC: 114 MG/DL (ref 70–108)
GLUCOSE SERPL-MCNC: 116 MG/DL (ref 70–108)
GP B STREP DNA SPEC QL NAA+PROBE: NOT DETECTED
HAEM INFLU DNA BLD POS QL NAA+NON-PROBE: NOT DETECTED
HAEM INFLU DNA SPEC QL NAA+PROBE: NOT DETECTED
HCT VFR BLD AUTO: 41.6 % (ref 37–47)
HCT VFR BLD AUTO: 41.9 % (ref 37–47)
HGB BLD-MCNC: 14.7 GM/DL (ref 12–16)
HGB BLD-MCNC: 14.7 GM/DL (ref 12–16)
HHV6 DNA CSF QL NAA+PROBE: NOT DETECTED
HSV1 DNA CSF QL NAA+PROBE: NOT DETECTED
HSV2 DNA CSF QL NAA+PROBE: NOT DETECTED
IMM GRANULOCYTES # BLD AUTO: 0.29 THOU/MM3 (ref 0–0.07)
IMM GRANULOCYTES # BLD AUTO: 0.71 THOU/MM3 (ref 0–0.07)
IMM GRANULOCYTES NFR BLD AUTO: 0.9 %
IMM GRANULOCYTES NFR BLD AUTO: 1.8 %
K OXYTOCA DNA BLD POS QL NAA+NON-PROBE: NOT DETECTED
K OXYTOCA DNA BLD POS QL NAA+NON-PROBE: NOT DETECTED
KLEBSIELLA SP DNA BLD POS QL NAA+NON-PRB: NOT DETECTED
L MONOCYTOG DNA BLD POS QL NAA+NON-PROBE: NOT DETECTED
L MONOCYTOG DNA SPEC QL NAA+PROBE: NOT DETECTED
LYMPHOCYTES ABSOLUTE: 0.8 THOU/MM3 (ref 1–4.8)
LYMPHOCYTES ABSOLUTE: 1.5 THOU/MM3 (ref 1–4.8)
LYMPHOCYTES NFR BLD AUTO: 2.1 %
LYMPHOCYTES NFR BLD AUTO: 4.9 %
MCH RBC QN AUTO: 33.1 PG (ref 26–33)
MCH RBC QN AUTO: 34.1 PG (ref 26–33)
MCHC RBC AUTO-ENTMCNC: 35.1 GM/DL (ref 32.2–35.5)
MCHC RBC AUTO-ENTMCNC: 35.3 GM/DL (ref 32.2–35.5)
MCV RBC AUTO: 94.4 FL (ref 81–99)
MCV RBC AUTO: 96.5 FL (ref 81–99)
MECA ISLT/SPM QL: ABNORMAL
MECA+MECC+MREJ ISLT/SPM QL: ABNORMAL
MONOCYTES ABSOLUTE: 2.5 THOU/MM3 (ref 0.4–1.3)
MONOCYTES ABSOLUTE: 3.4 THOU/MM3 (ref 0.4–1.3)
MONOCYTES NFR BLD AUTO: 8.2 %
MONOCYTES NFR BLD AUTO: 8.7 %
N MEN DNA BLD POS QL NAA+NON-PROBE: NOT DETECTED
N MEN DNA SPEC QL NAA+PROBE: NOT DETECTED
NDM: NOT DETECTED
NEUTROPHILS NFR BLD AUTO: 85.7 %
NEUTROPHILS NFR BLD AUTO: 87 %
NRBC BLD AUTO-RTO: 0 /100 WBC
NRBC BLD AUTO-RTO: 0 /100 WBC
P AERUGINOSA DNA BLD POS NAA+NON-PROBE: NOT DETECTED
PARECHOVIRUS A RNA SPEC QL NAA+PROBE: NOT DETECTED
PATHOLOGIST REVIEW: ABNORMAL
PLATELET # BLD AUTO: 261 THOU/MM3 (ref 130–400)
PLATELET # BLD AUTO: 272 THOU/MM3 (ref 130–400)
PLATELET BLD QL SMEAR: ADEQUATE
PLATELET BLD QL SMEAR: ADEQUATE
PMV BLD AUTO: 11.4 FL (ref 9.4–12.4)
PMV BLD AUTO: 11.8 FL (ref 9.4–12.4)
POIKILOCYTES: ABNORMAL
POIKILOCYTES: ABNORMAL
POTASSIUM SERPL-SCNC: 4.8 MEQ/L (ref 3.5–5.2)
PROTEUS SPP: NOT DETECTED
RBC # BLD AUTO: 4.31 MILL/MM3 (ref 4.2–5.4)
RBC # BLD AUTO: 4.44 MILL/MM3 (ref 4.2–5.4)
S AUREUS DNA BLD POS QL NAA+NON-PROBE: NOT DETECTED
S EPIDERMIDIS DNA BLD POS QL NAA+NON-PRB: NOT DETECTED
S LUGDUNENSIS DNA BLD POS QL NAA+NON-PRB: NOT DETECTED
S MALTOPHILIA DNA BLD POS QL NAA+NON-PRB: NOT DETECTED
S MARCESCENS DNA BLD POS NAA+NON-PROBE: NOT DETECTED
S PNEUM DNA SPEC QL NAA+PROBE: NOT DETECTED
S PYO DNA THROAT QL NAA+PROBE: NOT DETECTED
SALMONELLA DNA BLD POS QL NAA+NON-PROBE: NOT DETECTED
SCAN OF BLOOD SMEAR: NORMAL
SCAN OF BLOOD SMEAR: NORMAL
SEGMENTED NEUTROPHILS ABSOLUTE COUNT: 26.2 THOU/MM3 (ref 1.8–7.7)
SEGMENTED NEUTROPHILS ABSOLUTE COUNT: 33.6 THOU/MM3 (ref 1.8–7.7)
SODIUM SERPL-SCNC: 130 MEQ/L (ref 135–145)
SOURCE OF BLOOD CULTURE: ABNORMAL
SPHEROCYTES BLD QL SMEAR: ABNORMAL
SPHEROCYTES BLD QL SMEAR: ABNORMAL
STREPTOCOCCUS DNA BLD QL NAA+PROBE: NOT DETECTED
VANA+VANB ISLT/SPM QL: ABNORMAL
VARIANT LYMPHS BLD QL SMEAR: ABNORMAL %
VARIANT LYMPHS BLD QL SMEAR: ABNORMAL %
VZV DNA CSF QL NAA+PROBE: NOT DETECTED
WBC # BLD AUTO: 30.6 THOU/MM3 (ref 4.8–10.8)
WBC # BLD AUTO: 38.6 THOU/MM3 (ref 4.8–10.8)

## 2024-04-02 PROCEDURE — 6360000002 HC RX W HCPCS

## 2024-04-02 PROCEDURE — 6370000000 HC RX 637 (ALT 250 FOR IP): Performed by: PHYSICIAN ASSISTANT

## 2024-04-02 PROCEDURE — 6360000002 HC RX W HCPCS: Performed by: PHYSICIAN ASSISTANT

## 2024-04-02 PROCEDURE — 87186 SC STD MICRODIL/AGAR DIL: CPT

## 2024-04-02 PROCEDURE — 97129 THER IVNTJ 1ST 15 MIN: CPT

## 2024-04-02 PROCEDURE — 85025 COMPLETE CBC W/AUTO DIFF WBC: CPT

## 2024-04-02 PROCEDURE — 99291 CRITICAL CARE FIRST HOUR: CPT | Performed by: INTERNAL MEDICINE

## 2024-04-02 PROCEDURE — 82948 REAGENT STRIP/BLOOD GLUCOSE: CPT

## 2024-04-02 PROCEDURE — 87210 SMEAR WET MOUNT SALINE/INK: CPT

## 2024-04-02 PROCEDURE — 2100000000 HC CCU R&B

## 2024-04-02 PROCEDURE — 6370000000 HC RX 637 (ALT 250 FOR IP): Performed by: STUDENT IN AN ORGANIZED HEALTH CARE EDUCATION/TRAINING PROGRAM

## 2024-04-02 PROCEDURE — 97535 SELF CARE MNGMENT TRAINING: CPT

## 2024-04-02 PROCEDURE — 2500000003 HC RX 250 WO HCPCS: Performed by: NURSE PRACTITIONER

## 2024-04-02 PROCEDURE — 92610 EVALUATE SWALLOWING FUNCTION: CPT

## 2024-04-02 PROCEDURE — 87077 CULTURE AEROBIC IDENTIFY: CPT

## 2024-04-02 PROCEDURE — 87205 SMEAR GRAM STAIN: CPT

## 2024-04-02 PROCEDURE — 87075 CULTR BACTERIA EXCEPT BLOOD: CPT

## 2024-04-02 PROCEDURE — 97164 PT RE-EVAL EST PLAN CARE: CPT

## 2024-04-02 PROCEDURE — 6370000000 HC RX 637 (ALT 250 FOR IP): Performed by: NURSE PRACTITIONER

## 2024-04-02 PROCEDURE — 87801 DETECT AGNT MULT DNA AMPLI: CPT

## 2024-04-02 PROCEDURE — 89051 BODY FLUID CELL COUNT: CPT

## 2024-04-02 PROCEDURE — 36415 COLL VENOUS BLD VENIPUNCTURE: CPT

## 2024-04-02 PROCEDURE — 97110 THERAPEUTIC EXERCISES: CPT

## 2024-04-02 PROCEDURE — 6360000002 HC RX W HCPCS: Performed by: NURSE PRACTITIONER

## 2024-04-02 PROCEDURE — 80048 BASIC METABOLIC PNL TOTAL CA: CPT

## 2024-04-02 PROCEDURE — 51798 US URINE CAPACITY MEASURE: CPT

## 2024-04-02 PROCEDURE — 6360000002 HC RX W HCPCS: Performed by: STUDENT IN AN ORGANIZED HEALTH CARE EDUCATION/TRAINING PROGRAM

## 2024-04-02 PROCEDURE — 87798 DETECT AGENT NOS DNA AMP: CPT

## 2024-04-02 PROCEDURE — 2580000003 HC RX 258

## 2024-04-02 PROCEDURE — APPSS30 APP SPLIT SHARED TIME 16-30 MINUTES: Performed by: PHYSICIAN ASSISTANT

## 2024-04-02 PROCEDURE — 2580000003 HC RX 258: Performed by: PHYSICIAN ASSISTANT

## 2024-04-02 PROCEDURE — 87040 BLOOD CULTURE FOR BACTERIA: CPT

## 2024-04-02 PROCEDURE — 93005 ELECTROCARDIOGRAM TRACING: CPT | Performed by: STUDENT IN AN ORGANIZED HEALTH CARE EDUCATION/TRAINING PROGRAM

## 2024-04-02 RX ORDER — HYDROCODONE BITARTRATE AND ACETAMINOPHEN 5; 325 MG/1; MG/1
2 TABLET ORAL EVERY 4 HOURS PRN
Status: DISCONTINUED | OUTPATIENT
Start: 2024-04-02 | End: 2024-04-03

## 2024-04-02 RX ORDER — SODIUM CHLORIDE, SODIUM LACTATE, POTASSIUM CHLORIDE, AND CALCIUM CHLORIDE .6; .31; .03; .02 G/100ML; G/100ML; G/100ML; G/100ML
500 INJECTION, SOLUTION INTRAVENOUS ONCE
Status: COMPLETED | OUTPATIENT
Start: 2024-04-03 | End: 2024-04-03

## 2024-04-02 RX ORDER — MORPHINE SULFATE 2 MG/ML
2 INJECTION, SOLUTION INTRAMUSCULAR; INTRAVENOUS
Status: DISCONTINUED | OUTPATIENT
Start: 2024-04-02 | End: 2024-04-02

## 2024-04-02 RX ORDER — METOPROLOL TARTRATE 1 MG/ML
5 INJECTION, SOLUTION INTRAVENOUS ONCE
Status: COMPLETED | OUTPATIENT
Start: 2024-04-02 | End: 2024-04-02

## 2024-04-02 RX ORDER — MORPHINE SULFATE 2 MG/ML
1 INJECTION, SOLUTION INTRAMUSCULAR; INTRAVENOUS
Status: DISCONTINUED | OUTPATIENT
Start: 2024-04-02 | End: 2024-04-02

## 2024-04-02 RX ORDER — HYDRALAZINE HYDROCHLORIDE 20 MG/ML
5 INJECTION INTRAMUSCULAR; INTRAVENOUS EVERY 6 HOURS PRN
Status: DISCONTINUED | OUTPATIENT
Start: 2024-04-02 | End: 2024-04-19 | Stop reason: HOSPADM

## 2024-04-02 RX ORDER — HALOPERIDOL 5 MG/ML
1 INJECTION INTRAMUSCULAR ONCE
Status: COMPLETED | OUTPATIENT
Start: 2024-04-03 | End: 2024-04-02

## 2024-04-02 RX ORDER — SULFAMETHOXAZOLE AND TRIMETHOPRIM 400; 80 MG/1; MG/1
1 TABLET ORAL DAILY
Qty: 10 TABLET | Refills: 0 | Status: SHIPPED | OUTPATIENT
Start: 2024-04-02 | End: 2024-04-19 | Stop reason: HOSPADM

## 2024-04-02 RX ADMIN — DEXAMETHASONE SODIUM PHOSPHATE 4 MG: 4 INJECTION, SOLUTION INTRA-ARTICULAR; INTRALESIONAL; INTRAMUSCULAR; INTRAVENOUS; SOFT TISSUE at 16:09

## 2024-04-02 RX ADMIN — VANCOMYCIN HYDROCHLORIDE 1000 MG: 1 INJECTION, POWDER, LYOPHILIZED, FOR SOLUTION INTRAVENOUS at 17:39

## 2024-04-02 RX ADMIN — HYDROCODONE BITARTRATE AND ACETAMINOPHEN 1 TABLET: 5; 325 TABLET ORAL at 04:03

## 2024-04-02 RX ADMIN — HALOPERIDOL LACTATE 1 MG: 5 INJECTION, SOLUTION INTRAMUSCULAR at 23:57

## 2024-04-02 RX ADMIN — SODIUM CHLORIDE, PRESERVATIVE FREE 10 ML: 5 INJECTION INTRAVENOUS at 07:54

## 2024-04-02 RX ADMIN — CEFTRIAXONE SODIUM 2000 MG: 2 INJECTION, POWDER, FOR SOLUTION INTRAMUSCULAR; INTRAVENOUS at 17:35

## 2024-04-02 RX ADMIN — DEXAMETHASONE SODIUM PHOSPHATE 4 MG: 4 INJECTION, SOLUTION INTRA-ARTICULAR; INTRALESIONAL; INTRAMUSCULAR; INTRAVENOUS; SOFT TISSUE at 22:25

## 2024-04-02 RX ADMIN — FAMOTIDINE 20 MG: 20 TABLET, FILM COATED ORAL at 07:54

## 2024-04-02 RX ADMIN — GABAPENTIN 600 MG: 300 CAPSULE ORAL at 09:34

## 2024-04-02 RX ADMIN — HYDROCODONE BITARTRATE AND ACETAMINOPHEN 1 TABLET: 5; 325 TABLET ORAL at 12:43

## 2024-04-02 RX ADMIN — LABETALOL HYDROCHLORIDE 5 MG: 5 INJECTION, SOLUTION INTRAVENOUS at 16:09

## 2024-04-02 RX ADMIN — CYCLOBENZAPRINE 10 MG: 10 TABLET, FILM COATED ORAL at 07:54

## 2024-04-02 RX ADMIN — DEXAMETHASONE SODIUM PHOSPHATE 4 MG: 4 INJECTION, SOLUTION INTRA-ARTICULAR; INTRALESIONAL; INTRAMUSCULAR; INTRAVENOUS; SOFT TISSUE at 11:35

## 2024-04-02 RX ADMIN — MORPHINE SULFATE 2 MG: 2 INJECTION, SOLUTION INTRAMUSCULAR; INTRAVENOUS at 10:45

## 2024-04-02 RX ADMIN — AMPICILLIN SODIUM 2000 MG: 2 INJECTION, POWDER, FOR SOLUTION INTRAMUSCULAR; INTRAVENOUS at 23:37

## 2024-04-02 RX ADMIN — MORPHINE SULFATE 4 MG: 4 INJECTION, SOLUTION INTRAMUSCULAR; INTRAVENOUS at 13:22

## 2024-04-02 RX ADMIN — AMPICILLIN SODIUM 2000 MG: 2 INJECTION, POWDER, FOR SOLUTION INTRAMUSCULAR; INTRAVENOUS at 17:41

## 2024-04-02 RX ADMIN — HYDROCODONE BITARTRATE AND ACETAMINOPHEN 1 TABLET: 5; 325 TABLET ORAL at 07:53

## 2024-04-02 RX ADMIN — AMLODIPINE BESYLATE 5 MG: 5 TABLET ORAL at 07:54

## 2024-04-02 RX ADMIN — ONDANSETRON 4 MG: 2 INJECTION INTRAMUSCULAR; INTRAVENOUS at 04:09

## 2024-04-02 RX ADMIN — METOPROLOL TARTRATE 5 MG: 5 INJECTION INTRAVENOUS at 19:28

## 2024-04-02 RX ADMIN — HYDRALAZINE HYDROCHLORIDE 5 MG: 20 INJECTION, SOLUTION INTRAMUSCULAR; INTRAVENOUS at 21:11

## 2024-04-02 RX ADMIN — SODIUM CHLORIDE, PRESERVATIVE FREE 10 ML: 5 INJECTION INTRAVENOUS at 22:25

## 2024-04-02 RX ADMIN — DEXAMETHASONE SODIUM PHOSPHATE 4 MG: 4 INJECTION, SOLUTION INTRA-ARTICULAR; INTRALESIONAL; INTRAMUSCULAR; INTRAVENOUS; SOFT TISSUE at 04:05

## 2024-04-02 ASSESSMENT — PAIN DESCRIPTION - DESCRIPTORS
DESCRIPTORS: ACHING
DESCRIPTORS: ACHING;SHOOTING
DESCRIPTORS: SHARP;STABBING
DESCRIPTORS: ACHING;SHARP
DESCRIPTORS: ACHING;SHARP
DESCRIPTORS: SHARP
DESCRIPTORS: ACHING
DESCRIPTORS: SHARP;STABBING

## 2024-04-02 ASSESSMENT — PAIN SCALES - GENERAL
PAINLEVEL_OUTOF10: 4
PAINLEVEL_OUTOF10: 6
PAINLEVEL_OUTOF10: 8
PAINLEVEL_OUTOF10: 10
PAINLEVEL_OUTOF10: 10
PAINLEVEL_OUTOF10: 9
PAINLEVEL_OUTOF10: 1
PAINLEVEL_OUTOF10: 9
PAINLEVEL_OUTOF10: 3
PAINLEVEL_OUTOF10: 3
PAINLEVEL_OUTOF10: 0
PAINLEVEL_OUTOF10: 4
PAINLEVEL_OUTOF10: 7
PAINLEVEL_OUTOF10: 6
PAINLEVEL_OUTOF10: 10
PAINLEVEL_OUTOF10: 6
PAINLEVEL_OUTOF10: 3
PAINLEVEL_OUTOF10: 6
PAINLEVEL_OUTOF10: 7

## 2024-04-02 ASSESSMENT — PAIN DESCRIPTION - ORIENTATION
ORIENTATION: LOWER
ORIENTATION: MID;LOWER
ORIENTATION: LOWER
ORIENTATION: LOWER;MID
ORIENTATION: LOWER;MID
ORIENTATION: LOWER
ORIENTATION: LOWER

## 2024-04-02 ASSESSMENT — PAIN - FUNCTIONAL ASSESSMENT
PAIN_FUNCTIONAL_ASSESSMENT: INTOLERABLE, UNABLE TO DO ANY ACTIVE OR PASSIVE ACTIVITIES
PAIN_FUNCTIONAL_ASSESSMENT: ACTIVITIES ARE NOT PREVENTED
PAIN_FUNCTIONAL_ASSESSMENT: PREVENTS OR INTERFERES SOME ACTIVE ACTIVITIES AND ADLS
PAIN_FUNCTIONAL_ASSESSMENT: PREVENTS OR INTERFERES SOME ACTIVE ACTIVITIES AND ADLS
PAIN_FUNCTIONAL_ASSESSMENT: INTOLERABLE, UNABLE TO DO ANY ACTIVE OR PASSIVE ACTIVITIES
PAIN_FUNCTIONAL_ASSESSMENT: INTOLERABLE, UNABLE TO DO ANY ACTIVE OR PASSIVE ACTIVITIES
PAIN_FUNCTIONAL_ASSESSMENT: PREVENTS OR INTERFERES SOME ACTIVE ACTIVITIES AND ADLS
PAIN_FUNCTIONAL_ASSESSMENT: PREVENTS OR INTERFERES WITH MANY ACTIVE NOT PASSIVE ACTIVITIES

## 2024-04-02 ASSESSMENT — PAIN DESCRIPTION - LOCATION
LOCATION: BACK

## 2024-04-02 ASSESSMENT — PAIN SCALES - WONG BAKER: WONGBAKER_NUMERICALRESPONSE: NO HURT

## 2024-04-02 ASSESSMENT — PAIN DESCRIPTION - FREQUENCY: FREQUENCY: CONTINUOUS

## 2024-04-02 ASSESSMENT — PAIN DESCRIPTION - ONSET: ONSET: ON-GOING

## 2024-04-02 ASSESSMENT — PAIN DESCRIPTION - PAIN TYPE
TYPE: CHRONIC PAIN;SURGICAL PAIN
TYPE: ACUTE PAIN

## 2024-04-02 NOTE — PROGRESS NOTES
[facility]   Pharmacy Pharmacokinetic Monitoring Service - Vancomycin     Alpa Marte is a 68 y.o. female starting on vancomycin therapy for meningitis. Pharmacy consulted by Dr. Chatman for monitoring and adjustment.    Target Concentration: Goal AUC/JAKE 400-600 mg*hr/L    Additional Antimicrobials: ampicillin + ceftriaxone    Pertinent Laboratory Values:   Wt Readings from Last 1 Encounters:   04/02/24 45.1 kg (99 lb 6.8 oz)     Temp Readings from Last 1 Encounters:   04/02/24 97.4 °F (36.3 °C) (Axillary)     Estimated Creatinine Clearance: 78 mL/min (based on SCr of 0.5 mg/dL).  Recent Labs     04/01/24  0635 04/02/24  0600 04/02/24  1630   CREATININE 0.5 0.5  --    BUN 22 22  --    WBC 13.8* 30.6* 38.6*     Pertinent Cultures:  Date Source Results   4/2/2024 CSF pending     Plan:  Dosing recommendations based on Bayesian software  Start vancomycin 1000 mg q12h  Anticipated AUC of 566 and trough concentration of 13.5 at steady state  Renal labs as indicated   Vancomycin concentration planned in 1-2 days  Pharmacy will continue to monitor patient and adjust therapy as indicated    Thank you for the consult,  Lanie Olson, PharmD, BCPS, BCCCP  4/2/2024 5:00 PM

## 2024-04-02 NOTE — PROGRESS NOTES
Neurosurgery Progress Note    Patient:  Alpa Marte      Unit/Bed:3A-03/003-A    YOB: 1955    MRN: [id number]     Acct: [id number]     Admit date: 3/26/2024    No chief complaint on file.      Patient Seen, Chart, Physician notes, Labs, Radiology studies reviewed.    Subjective: The patient is seen and evaluated on 3A at postoperative day 7 from reexploration and revision of lumbar decompression and fusion including extension of instrumentation.  Pain is well to moderately controlled.        Past, Family, Social History unchanged from admission.    Diet:  ADULT ORAL NUTRITION SUPPLEMENT; Breakfast, Lunch, Dinner; Clear Liquid Oral Supplement  ADULT DIET; Easy to Chew  ADULT ORAL NUTRITION SUPPLEMENT; Breakfast, Lunch, Dinner; Other Oral Supplement; Activia and hot beverage    Medications:  Scheduled Meds:   amLODIPine  5 mg Oral Daily    famotidine  20 mg Oral BID    gabapentin  600 mg Oral QAM    And    gabapentin  900 mg Oral Nightly    rOPINIRole  1 mg Oral Nightly    DULoxetine  20 mg Oral Nightly    sodium chloride flush  5-40 mL IntraVENous 2 times per day    dexAMETHasone  4 mg IntraVENous Q6H     Continuous Infusions:   sodium chloride       PRN Meds:ondansetron, labetalol, sodium chloride flush, sodium chloride, morphine **OR** morphine, HYDROcodone 5 mg - acetaminophen, cyclobenzaprine    Objective: Patient is observed lying on her right side comfortably with pain well to moderately controlled.  She remains otherwise stable and intact neurologically to her baseline with few changes overnight.  She tolerated placement of lumbar drain to reduce pressures to facilitate wound closure with the device set at an operating at 15 cc/h.  Dressings are intact over flat incision with nursing instructed to change dressings as needed.    Vitals: BP (!) 153/75   Pulse 79   Temp 98 °F (36.7 °C) (Oral)   Resp 12   Ht 1.651 m (5' 5\")   Wt 45.1 kg (99 lb 6.8 oz)   SpO2 97%   BMI 16.55 kg/m²      Physical Exam     Physical Exam:  Alert and attentive.  Language appropriate, with no aphasia.  Pupils equal.  Facial strength symmetric.    Review of Systems   Constitutional:  Negative for activity change.   Respiratory:  Negative for apnea, chest tightness and shortness of breath.    Gastrointestinal:  Positive for nausea.   Neurological:  Positive for headaches.   Psychiatric/Behavioral:  Negative for agitation, behavioral problems, confusion and decreased concentration.         24 hour intake/output:  Intake/Output Summary (Last 24 hours) at 4/2/2024 0746  Last data filed at 4/2/2024 0723  Gross per 24 hour   Intake 300 ml   Output 627.9 ml   Net -327.9 ml     Last 3 weights:  Wt Readings from Last 3 Encounters:   04/02/24 45.1 kg (99 lb 6.8 oz)   03/11/24 48.6 kg (107 lb 2.3 oz)   01/15/24 48.6 kg (107 lb 3.2 oz)         CBC:   Recent Labs     03/31/24  0512 04/01/24  0635 04/02/24  0600   WBC 14.8* 13.8* 30.6*   HGB 12.7 13.0 14.7    244 261     BMP:    Recent Labs     03/31/24  0512 04/01/24  0635 04/02/24  0600   * 135 130*   K 3.9 4.4 4.8   CL 94* 97* 95*   CO2 23 23 23   BUN 20 22 22   CREATININE 0.6 0.5 0.5   GLUCOSE 116* 114* 116*     Calcium:  Recent Labs     04/02/24  0600   CALCIUM 9.3     Magnesium:No results for input(s): \"MG\" in the last 72 hours.  Glucose:  Recent Labs     04/01/24  1913   POCGLU 125*     HgbA1C: No results for input(s): \"LABA1C\" in the last 72 hours.  Lipids: No results for input(s): \"CHOL\", \"TRIG\", \"HDL\", \"LDL\", \"LDLCALC\" in the last 72 hours.    Radiology reports as per the Radiologist  Radiology: XR LUMBAR SPINE (2-3 VIEWS)    Result Date: 3/27/2024  PROCEDURE: XR LUMBAR SPINE (2-3 VIEWS) CLINICAL INFORMATION: Reexploration and Revision of Lumbar Instrumented Fusion for Extension of the Decompression and Instrumentation to L3-Pelvis (Head), COMPARISON: No prior study. TECHNIQUE: AP and lateral views of the lumbar spine were obtained intraoperatively.

## 2024-04-02 NOTE — PROGRESS NOTES
CRITICAL CARE PROGRESS NOTE      Patient:   Alpa Marte    Unit/Bed: 3A-03/003-A  YOB: 1955  MRN:  [id number]   PCP:  Connie Augustin PA  Date of Admission:  3/26/2024    Chief Complaint:  Acute hemorrhage    Assessment and Plan:  Acute left cerebellar hemorrhage: On 3/31, 12 x 7 mm area of acute hemorrhage left cerebellar hemisphere, with bilateral tentorium cerebri hemorrhage noted on MRI.  Neurology signed off, defer to neurosurgery.  Neurosurgery consulted, following, recommend no acute surgical intervention.  Dexamethasone per neurosurgery recommendations  PT OT  Up in chair as tolerated  Wound VAC removed 3/29.  Holding anticoagulation/antiplatelet. Resume per neurosurgery.  S/p lumbar fusion L3-sacrum: POD 7.  Similar to prior procedures in 1989, 2003 at [facility].  Wound VAC removed 3/29.  Due to CSF leak, lumbar drain by IR on 4/1.  Neurosurgery continues to follow  Monitor   Hypertensive urgency: On no home antihypertensives. /97 on 3/26. As needed labetalol since 3/26, continues to have uncontrolled blood pressure.  Continue amlodipine 5 mg po qd  Labetalol 5 mg IV q4h prn  Restless leg syndrome: Continue home Requip  Depression: Continue home Cymbalta  Tobacco abuse: Discussed tobacco cessation  Malnutrition: BMI 16.59    INITIAL H&P AND COURSE:  Patient has history of chronic lumbar back pain, has had previous surgery and lumbar fusions in 1989, 2003 at [facility].  Most recently had similar procedure done by Dr. Caldera on 3/26.  Patient developed episodes of staring spells and twitching, and shortly thereafter had EEG 3/29 showing abnormality.  On 3/30 had CT head showing indistinct hyperattenuation left occipital lobe suspicious for parenchymal hemorrhage.  Follow-up MRI then showed 12 x 7 mm area of acute hemorrhage in left cerebellar hemisphere.  Neurosurgery was consulted.  They recommended continued treatment with dexamethasone.     Past 24 Hr

## 2024-04-02 NOTE — PROGRESS NOTES
Comprehensive Nutrition Assessment    Type and Reason for Visit:  Reassess (PO monitor and ONS)    Nutrition Recommendations/Plan:   Recommend bowel regimen as pt without a BM this admit - on pain medications   Continue diet per provider and encourage adequate PO intake at best efforts  Continue ONS: Ensure Plus (TID) and Ileus prevention protocol  Recommend MVI     Malnutrition Assessment:  Malnutrition Status:  Severe malnutrition (04/02/24 1422)    Context:  Chronic Illness     Findings of the 6 clinical characteristics of malnutrition:  Energy Intake:  75% or less estimated energy requirements for 1 month or longer  Weight Loss:  7.5% over 3 months (8% loss in 3 months from office visit weight of 108# 6.4 oz on 1/4/24)     Body Fat Loss:  Severe body fat loss Triceps, Fat Overlying Ribs, Buccal region, Orbital   Muscle Mass Loss:  Severe muscle mass loss Temples (temporalis), Scapula (trapezius), Clavicles (pectoralis & deltoids), Thigh (quadriceps), Calf (gastrocnemius)  Fluid Accumulation:  No significant fluid accumulation     Strength:  Not Performed    Nutrition Assessment:     Pt. severely malnourished AEB criteria listed above.  At risk for further nutritional compromise r/t acute L cerebellar hemorrhage on 3/31, s/p lumbar fusion L3-sacrum POD #7 - wound vac removed 3/29 and lumbar drain placed by IR 4/1 d/t CSF leak, hypertensive urgency, and underlying medical condition (PMHx: s/p lumbar fusion 1998 and 2003, current smoker).       Nutrition Related Findings:    Pt. Report/Treatments/Miscellaneous: Pt seen, screaming in pain and continued to get up off bed causing bed alarm to go off. Pt is in pain and not eating. Spoke with RN regarding PO intake.  GI Status: no BM yet?; emesis yesterday  Pertinent Labs: Na 130, glucose 116  Pertinent Meds: dexamethasone, pepcid, norco, morphine, zofran     Wound Type:  (lumbar incision - wound vac removed 3/29; lumbar drain 4/1 d/t CSF leak)       Current

## 2024-04-02 NOTE — PROGRESS NOTES
[facility]  STRZ CCU 3A  Occupational Therapy  Daily Note  Time:    Time In: 1450  Time Out: 1508  Timed Code Treatment Minutes: 18 Minutes  Minutes: 18          Date: 2024  Patient Name: Alpa Marte,   Gender: female      Room: [facility]03/003-A  MRN: [id number]  : 1955  (68 y.o.)  Referring Practitioner: Santino Stewart PA-C  Diagnosis: Adjacent segment disease of lumbar spine with history of fusion procedure  Additional Pertinent Hx: Per MD H & P:68 y.o.  female, an every day smoker tobacco and 1/2 pack day history who admits to regular alcohol use and has a medical history significant for restless leg syndrome and urinary incontinence with a surgical history that includes prior lumbar fusions in  and again in  performed at [facility] resulting in pedicle screw and garima instrumentation at lumbar 4 spanning to S1.  She presents today unaccompanied and ambulating without assistance with complaints of chronic worsening low back pain radiating primarily to the left lower extremity all the way to the foot including chronic weakness and chronic partial left foot drop.  She does not take any medication other than over-the-counter medications and treats with ice and heat and rest.  In the past she has been treated by pain specialist who provided some injection therapies (greater than 20 years prior) that provided only transient relief. A CT scan imaged on 2024 that reflect pedicle screws from L4-S1 along with laminectomies and a grade 3 anterior listhesis of L5 on S1 unchanged. The flexion-extension x-ray also shows the grade 3 to grade 4 anterior listhesis of L5 on S1 with no evidence of additional change in flexion or extension. SI joint imaging reveals some narrowing along with sclerosis for SI joints bilaterally.  s/p Reexploration and Revision of Lumbar Instrumented Fusion for Extension of the Decompression and Instrumentation to L3-Pelvis on 3/26. CT 3/30  No

## 2024-04-02 NOTE — PROGRESS NOTES
[facility]  SPEECH THERAPY  STRZ CCU 3A  Clinical Swallow Evaluation + Dysphagia Therapy      SLP Individual Minutes  Time In: 0957  Time Out: 1015  Minutes: 18  Timed Code Treatment Minutes: 10 Minutes  Clinical swallow evaluation: 8 minutes  Cognitive therapy: 10 minutes       DIET ORDER RECOMMENDATIONS AFTER EVALUATION: Regular, thin liquids    Date: 2024  Patient Name: Alpa Marte      CSN: [id number]   : 1955  (68 y.o.)  Gender: female   Referring Physician:  David Chatman DO     Diagnosis: S/P lumbar fusion    History of Present Illness/Injury: Patient admitted to [facility] with above med dx; please refer to physician H&P for full details. Per chart review, \"68 y.o. female with past medical history of chronic lumbar back pain, restless leg syndrome, urinary incontinence, tobacco abuse, regular alcohol use, and arthritis who came in to hospitals for her lumbar surgical intervention with neurosurgery, Dr. Caldera. Chart review reveals that patient had surgical history significant for lumbar fusions in  and again in  done at [facility] resulting in pedicle screw and garima instrumentation at lumbar 4 spanning to S1. Reported that patient was recently seen and evaluated by Dr. Caldera as a referral on 1/15/2024. Her complaint was significant to worsening radiating lumbar back pain with urinary incontinence, and her appointment includes new imaging studies in the form of flexion-extension x-ray of the lumbar spine to rule out gross instability along with a CT scan of the lumbar spine for surgical planning. Patient was seen again at Dr. Caldera's office with complaints of chronic worsening low back pain radiating primarily to the left lower extremity all the way to the foot including chronic weakness and chronic partial left foot drop. She does not take any medication other than over-the-counter medications and treats with ice and heat and rest.  In the past she has been

## 2024-04-02 NOTE — PLAN OF CARE
Problem: Discharge Planning  Goal: Discharge to home or other facility with appropriate resources  4/1/2024 2215 by Gaye Ramirez RN  Outcome: Progressing  4/1/2024 1712 by Tere Taylor RN  Outcome: Progressing  Flowsheets (Taken 4/1/2024 1712)  Discharge to home or other facility with appropriate resources:   Identify barriers to discharge with patient and caregiver   Arrange for needed discharge resources and transportation as appropriate   Identify discharge learning needs (meds, wound care, etc)   Refer to discharge planning if patient needs post-hospital services based on physician order or complex needs related to functional status, cognitive ability or social support system     Problem: Pain  Goal: Verbalizes/displays adequate comfort level or baseline comfort level  4/1/2024 2215 by Gaye Ramirez RN  Outcome: Progressing  4/1/2024 1712 by Tere Taylor RN  Outcome: Progressing  Flowsheets (Taken 4/1/2024 1712)  Verbalizes/displays adequate comfort level or baseline comfort level:   Encourage patient to monitor pain and request assistance   Assess pain using appropriate pain scale   Administer analgesics based on type and severity of pain and evaluate response   Implement non-pharmacological measures as appropriate and evaluate response   Consider cultural and social influences on pain and pain management   Notify Licensed Independent Practitioner if interventions unsuccessful or patient reports new pain     Problem: ABCDS Injury Assessment  Goal: Absence of physical injury  4/1/2024 2215 by Gaye Ramirez RN  Outcome: Progressing  4/1/2024 1712 by Tere Taylor RN  Outcome: Progressing  Flowsheets (Taken 4/1/2024 1712)  Absence of Physical Injury: Implement safety measures based on patient assessment     Problem: Safety - Adult  Goal: Free from fall injury  4/1/2024 2215 by Gaye Ramirez RN  Outcome: Progressing  4/1/2024 1712 by Tere Taylor RN  Outcome:  Clinical Nurse Specialist and Spiritual Care consult, as indicated     Problem: Musculoskeletal - Adult  Goal: Return mobility to safest level of function  4/1/2024 2215 by Gaye Ramirez RN  Outcome: Progressing  Flowsheets (Taken 4/1/2024 1930)  Return Mobility to Safest Level of Function: Assess patient stability and activity tolerance for standing, transferring and ambulating with or without assistive devices  4/1/2024 1712 by Tere Taylor RN  Outcome: Progressing  Flowsheets (Taken 4/1/2024 1712)  Return Mobility to Safest Level of Function:   Assess patient stability and activity tolerance for standing, transferring and ambulating with or without assistive devices   Assist with transfers and ambulation using safe patient handling equipment as needed   Obtain physical therapy/occupational therapy consults as needed   Instruct patient/family in ordered activity level  Goal: Return ADL status to a safe level of function  4/1/2024 2215 by Gaye Ramirez RN  Outcome: Progressing  4/1/2024 1712 by Tere Taylor RN  Outcome: Progressing  Flowsheets (Taken 4/1/2024 1712)  Return ADL Status to a Safe Level of Function:   Administer medication as ordered   Obtain physical therapy/occupational therapy consults as needed     Problem: Gastrointestinal - Adult  Goal: Maintains or returns to baseline bowel function  4/1/2024 2215 by Gaye Ramirez RN  Outcome: Progressing  Flowsheets (Taken 4/1/2024 1930)  Maintains or returns to baseline bowel function:   Assess bowel function   Encourage oral fluids to ensure adequate hydration   Administer ordered medications as needed  4/1/2024 1712 by Tere Taylor RN  Outcome: Progressing  Flowsheets (Taken 4/1/2024 1712)  Maintains or returns to baseline bowel function:   Assess bowel function   Encourage mobilization and activity   Encourage oral fluids to ensure adequate hydration     Problem: Neurosensory - Adult  Goal: Achieves stable or improved neurological

## 2024-04-02 NOTE — PROGRESS NOTES
1609- Patient thrashing and thriving. Standing up multiple times in bed, setting off alarms. Dr. Chatman at bedside. Lumbar dressings redressed. Patient given PRN morphine for pain 10/10.     1715- Dr. Chatman and MYRNA De aware of mentation changes after 4 mg of morphine. Patient in bed, eyes open but snoring.

## 2024-04-02 NOTE — PROGRESS NOTES
[facility]  INPATIENT PHYSICAL THERAPY  Re-assessment/DAILY NOTE  STRZ CCU 3A - 3A-03/003-A     Time In: 08  Time Out: 0852  Timed Code Treatment Minutes: 12 Minutes  Minutes: 27          Date: 2024  Patient Name: Alpa Marte,  Gender:  female        MRN: [id number]  : 1955  (68 y.o.)     Referring Practitioner: Santino Stewart PA-C  Diagnosis: Adjacent segment disease of lumbar spine with history of fusion procedure  Additional Pertinent Hx: Per HPI, \"Alpa Marte is an 68 y.o.  female, an every day smoker tobacco and 1/2 pack day history who admits to regular alcohol use and has a medical history significant for restless leg syndrome and urinary incontinence with a surgical history that includes prior lumbar fusions in  and again in  performed at [facility] resulting in pedicle screw and garima instrumentation at lumbar 4 spanning to S1.  She presents today unaccompanied and ambulating without assistance with complaints of chronic worsening low back pain radiating primarily to the left lower extremity all the way to the foot including chronic weakness and chronic partial left foot drop.  She does not take any medication other than over-the-counter medications and treats with ice and heat and rest.  In the past she has been treated by pain specialist who provided some injection therapies (greater than 20 years prior) that provided only transient relief. A CT scan imaged on 2024 that reflect pedicle screws from L4-S1 along with laminectomies and a grade 3 anterior listhesis of L5 on S1 unchanged. The flexion-extension x-ray also shows the grade 3 to grade 4 anterior listhesis of L5 on S1 with no evidence of additional change in flexion or extension. SI joint imaging reveals some narrowing along with sclerosis for SI joints bilaterally.\"  Pt is now s/p Reexploration and Revision of Lumbar Instrumented Fusion for Extension of the Decompression and  and Long arc quads.  Exercises were completed for increased independence with functional mobility.    Functional Outcome Measures: Completed  AM-PAC Inpatient Mobility Raw Score : 18  AM-PAC Inpatient T-Scale Score : 43.63  Modified Judy Scale:  +4 - Moderately severe disability; unable to walk and attend to bodily needs without assistance.   Patient could not live alone and could not walk from one room to another without physical help from another person, but they can sit up in bed without any help.  Education provided regarding stroke rehabilitation management.    ASSESSMENT:  Assessment: Patient progressing toward established goals. and mobility is similar to prior evaluation. Goals still appropriate  Activity Tolerance:  Patient tolerance of  treatment: good.       Equipment Recommendations:Other: monitor for needs  Discharge Recommendations: Continue to assess pending progress, Inpatient Rehabilitation, Patient would benefit from continued PT at discharge, and Inpatient Therapy Stay  Plan: Current Treatment Recommendations: Strengthening, Balance training, Functional mobility training, Gait training, Transfer training, ADL/Self-care training, Stair training, Home exercise program, Safety education & training, Patient/Caregiver education & training, Endurance training, Therapeutic activities  General Plan:  (6x N)    Education  Education:  Learners: Patient  Patient Education: Plan of Care, Home Exercise Program    Goals:  Patient Goals : go home  Short Term Goals  Time Frame for Short Term Goals: at discharge  Short Term Goal 1: Pt to be Mod I for supine <> sit to get in/out of bed  Short Term Goal 2: Pt to be Mod I for sit <> stand to get up to ambulate  Short Term Goal 3: Pt to ambulate >100 ft with RW with Supervision for household distances  Short Term Goal 4: Pt to negotiate 3-4 steps with 1 rail with Supervision for home access  Long Term Goals  Time Frame for Long Term Goals : not set due to short

## 2024-04-02 NOTE — PROGRESS NOTES
Continue to follow patient clinical course and therapy progress for rehab referral consideration.

## 2024-04-03 ENCOUNTER — APPOINTMENT (OUTPATIENT)
Dept: INTERVENTIONAL RADIOLOGY/VASCULAR | Age: 69
End: 2024-04-03
Attending: NEUROLOGICAL SURGERY
Payer: MEDICARE

## 2024-04-03 ENCOUNTER — APPOINTMENT (OUTPATIENT)
Dept: CT IMAGING | Age: 69
End: 2024-04-03
Attending: NEUROLOGICAL SURGERY
Payer: MEDICARE

## 2024-04-03 PROBLEM — G89.18 POST-OP PAIN: Status: ACTIVE | Noted: 2024-04-03

## 2024-04-03 LAB
ANION GAP SERPL CALC-SCNC: 18 MEQ/L (ref 8–16)
B-OH-BUTYR SERPL-MSCNC: 4.36 MG/DL (ref 0.2–2.81)
BACTERIA BLD AEROBE CULT: ABNORMAL
BACTERIA: ABNORMAL
BASOPHILS ABSOLUTE: 0.1 THOU/MM3 (ref 0–0.1)
BASOPHILS NFR BLD AUTO: 0.5 %
BILIRUB UR QL STRIP: NEGATIVE
BUN SERPL-MCNC: 39 MG/DL (ref 7–22)
CALCIUM SERPL-MCNC: 9 MG/DL (ref 8.5–10.5)
CASTS #/AREA URNS LPF: ABNORMAL /LPF
CASTS #/AREA URNS LPF: ABNORMAL /LPF
CHARACTER UR: CLEAR
CHARACTER, CSF: ABNORMAL
CHARCOAL URNS QL MICRO: ABNORMAL
CHLORIDE SERPL-SCNC: 98 MEQ/L (ref 98–111)
CO2 SERPL-SCNC: 18 MEQ/L (ref 23–33)
COLOR CSF: ABNORMAL
COLOR UR: YELLOW
CREAT SERPL-MCNC: 0.9 MG/DL (ref 0.4–1.2)
CRYSTALS URNS QL MICRO: ABNORMAL
DEPRECATED RDW RBC AUTO: 44.3 FL (ref 35–45)
EOSINOPHIL NFR BLD AUTO: 0 %
EOSINOPHILS ABSOLUTE: 0 THOU/MM3 (ref 0–0.4)
EPITHELIAL CELLS, UA: ABNORMAL /HPF
ERYTHROCYTE [DISTWIDTH] IN BLOOD BY AUTOMATED COUNT: 13.1 % (ref 11.5–14.5)
FERRITIN SERPL IA-MCNC: 491 NG/ML (ref 10–291)
GFR SERPL CREATININE-BSD FRML MDRD: 70 ML/MIN/1.73M2
GLUCOSE SERPL-MCNC: 116 MG/DL (ref 70–108)
GLUCOSE UR QL STRIP.AUTO: NEGATIVE MG/DL
HCT VFR BLD AUTO: 38.5 % (ref 37–47)
HGB BLD-MCNC: 13.9 GM/DL (ref 12–16)
HGB UR QL STRIP.AUTO: NEGATIVE
IMM GRANULOCYTES # BLD AUTO: 0.27 THOU/MM3 (ref 0–0.07)
IMM GRANULOCYTES NFR BLD AUTO: 0.9 %
KETONES UR QL STRIP.AUTO: NEGATIVE
LACTATE SERPL-SCNC: 1.8 MMOL/L (ref 0.5–2)
LEUKOCYTE ESTERASE UR QL STRIP.AUTO: NEGATIVE
LYMPHOCYTES ABSOLUTE: 1.2 THOU/MM3 (ref 1–4.8)
LYMPHOCYTES NFR BLD AUTO: 4.1 %
MAGNESIUM SERPL-MCNC: 2 MG/DL (ref 1.6–2.4)
MCH RBC QN AUTO: 34.1 PG (ref 26–33)
MCHC RBC AUTO-ENTMCNC: 36.1 GM/DL (ref 32.2–35.5)
MCV RBC AUTO: 94.4 FL (ref 81–99)
MONOCYTES ABSOLUTE: 1.9 THOU/MM3 (ref 0.4–1.3)
MONOCYTES NFR BLD AUTO: 6.6 %
MONOS+MACROS NFR CSF MANUAL: 2 % (ref 0–45)
NEUTROPHILS NFR BLD AUTO: 87.9 %
NEUTS SEG NFR CSF MANUAL: 98 % (ref 0–6)
NITRITE UR QL STRIP.AUTO: NEGATIVE
NRBC BLD AUTO-RTO: 0 /100 WBC
NUC CELL # FLD AUTO: ABNORMAL /CUMM (ref 0–5)
ORGANISM: ABNORMAL
ORGANISM: ABNORMAL
PATHOLOGIST REVIEW: ABNORMAL
PH UR STRIP.AUTO: 5.5 [PH] (ref 5–9)
PLATELET # BLD AUTO: 221 THOU/MM3 (ref 130–400)
PMV BLD AUTO: 11.6 FL (ref 9.4–12.4)
POTASSIUM SERPL-SCNC: 4.4 MEQ/L (ref 3.5–5.2)
PROT UR STRIP.AUTO-MCNC: ABNORMAL MG/DL
RBC # BLD AUTO: 4.08 MILL/MM3 (ref 4.2–5.4)
RBC # CSF MANUAL: ABNORMAL /CUMM
RBC #/AREA URNS HPF: ABNORMAL /HPF
RENAL EPI CELLS #/AREA URNS HPF: ABNORMAL /[HPF]
SCAN OF BLOOD SMEAR: NORMAL
SEGMENTED NEUTROPHILS ABSOLUTE COUNT: 25.2 THOU/MM3 (ref 1.8–7.7)
SODIUM SERPL-SCNC: 134 MEQ/L (ref 135–145)
SPECIFIC GRAVITY UA: 1.03 (ref 1–1.03)
TUBE VOLUME RECEIVED CSF: 3 ML
UROBILINOGEN, URINE: 0.2 EU/DL (ref 0–1)
WBC # BLD AUTO: 28.7 THOU/MM3 (ref 4.8–10.8)
WBC #/AREA URNS HPF: ABNORMAL /HPF
YEAST LIKE FUNGI URNS QL MICRO: ABNORMAL

## 2024-04-03 PROCEDURE — 6370000000 HC RX 637 (ALT 250 FOR IP): Performed by: STUDENT IN AN ORGANIZED HEALTH CARE EDUCATION/TRAINING PROGRAM

## 2024-04-03 PROCEDURE — 51701 INSERT BLADDER CATHETER: CPT

## 2024-04-03 PROCEDURE — 2580000003 HC RX 258: Performed by: PHYSICIAN ASSISTANT

## 2024-04-03 PROCEDURE — 2580000003 HC RX 258: Performed by: STUDENT IN AN ORGANIZED HEALTH CARE EDUCATION/TRAINING PROGRAM

## 2024-04-03 PROCEDURE — 6370000000 HC RX 637 (ALT 250 FOR IP): Performed by: PHYSICIAN ASSISTANT

## 2024-04-03 PROCEDURE — 97110 THERAPEUTIC EXERCISES: CPT

## 2024-04-03 PROCEDURE — 99024 POSTOP FOLLOW-UP VISIT: CPT | Performed by: NEUROLOGICAL SURGERY

## 2024-04-03 PROCEDURE — 36415 COLL VENOUS BLD VENIPUNCTURE: CPT

## 2024-04-03 PROCEDURE — 82010 KETONE BODYS QUAN: CPT

## 2024-04-03 PROCEDURE — 2709999900 IR FLUORO GUIDED NEEDLE PLACEMENT

## 2024-04-03 PROCEDURE — 62329 THER SPI PNXR CSF FLUOR/CT: CPT

## 2024-04-03 PROCEDURE — APPSS60 APP SPLIT SHARED TIME 46-60 MINUTES: Performed by: PHYSICIAN ASSISTANT

## 2024-04-03 PROCEDURE — 6370000000 HC RX 637 (ALT 250 FOR IP): Performed by: NURSE PRACTITIONER

## 2024-04-03 PROCEDURE — 51702 INSERT TEMP BLADDER CATH: CPT

## 2024-04-03 PROCEDURE — 99222 1ST HOSP IP/OBS MODERATE 55: CPT | Performed by: NURSE PRACTITIONER

## 2024-04-03 PROCEDURE — 85025 COMPLETE CBC W/AUTO DIFF WBC: CPT

## 2024-04-03 PROCEDURE — 93010 ELECTROCARDIOGRAM REPORT: CPT | Performed by: INTERNAL MEDICINE

## 2024-04-03 PROCEDURE — 2500000003 HC RX 250 WO HCPCS: Performed by: RADIOLOGY

## 2024-04-03 PROCEDURE — 6360000002 HC RX W HCPCS: Performed by: RADIOLOGY

## 2024-04-03 PROCEDURE — 72131 CT LUMBAR SPINE W/O DYE: CPT

## 2024-04-03 PROCEDURE — 82728 ASSAY OF FERRITIN: CPT

## 2024-04-03 PROCEDURE — 81001 URINALYSIS AUTO W/SCOPE: CPT

## 2024-04-03 PROCEDURE — 80048 BASIC METABOLIC PNL TOTAL CA: CPT

## 2024-04-03 PROCEDURE — 6360000002 HC RX W HCPCS

## 2024-04-03 PROCEDURE — 99291 CRITICAL CARE FIRST HOUR: CPT | Performed by: INTERNAL MEDICINE

## 2024-04-03 PROCEDURE — 83605 ASSAY OF LACTIC ACID: CPT

## 2024-04-03 PROCEDURE — 83735 ASSAY OF MAGNESIUM: CPT

## 2024-04-03 PROCEDURE — 2100000000 HC CCU R&B

## 2024-04-03 PROCEDURE — 97129 THER IVNTJ 1ST 15 MIN: CPT

## 2024-04-03 PROCEDURE — 2580000003 HC RX 258

## 2024-04-03 PROCEDURE — 6360000002 HC RX W HCPCS: Performed by: PHYSICIAN ASSISTANT

## 2024-04-03 PROCEDURE — 97530 THERAPEUTIC ACTIVITIES: CPT

## 2024-04-03 PROCEDURE — 97116 GAIT TRAINING THERAPY: CPT

## 2024-04-03 PROCEDURE — 51798 US URINE CAPACITY MEASURE: CPT

## 2024-04-03 RX ORDER — FAMOTIDINE 20 MG/1
20 TABLET, FILM COATED ORAL DAILY
Status: DISCONTINUED | OUTPATIENT
Start: 2024-04-03 | End: 2024-04-19 | Stop reason: HOSPADM

## 2024-04-03 RX ORDER — ACETAMINOPHEN 325 MG/1
650 TABLET ORAL EVERY 4 HOURS PRN
Status: DISCONTINUED | OUTPATIENT
Start: 2024-04-03 | End: 2024-04-19 | Stop reason: HOSPADM

## 2024-04-03 RX ORDER — LIDOCAINE HYDROCHLORIDE 20 MG/ML
INJECTION, SOLUTION EPIDURAL; INFILTRATION; INTRACAUDAL; PERINEURAL PRN
Status: COMPLETED | OUTPATIENT
Start: 2024-04-03 | End: 2024-04-03

## 2024-04-03 RX ORDER — DOCUSATE SODIUM 100 MG/1
100 CAPSULE, LIQUID FILLED ORAL 2 TIMES DAILY
Status: DISCONTINUED | OUTPATIENT
Start: 2024-04-03 | End: 2024-04-06

## 2024-04-03 RX ORDER — ROPINIROLE 1 MG/1
1 TABLET, FILM COATED ORAL NIGHTLY
Status: DISCONTINUED | OUTPATIENT
Start: 2024-04-03 | End: 2024-04-19 | Stop reason: HOSPADM

## 2024-04-03 RX ORDER — DULOXETIN HYDROCHLORIDE 20 MG/1
20 CAPSULE, DELAYED RELEASE ORAL NIGHTLY
Status: DISCONTINUED | OUTPATIENT
Start: 2024-04-03 | End: 2024-04-19 | Stop reason: HOSPADM

## 2024-04-03 RX ORDER — NALOXONE HYDROCHLORIDE 0.4 MG/ML
0.4 INJECTION, SOLUTION INTRAMUSCULAR; INTRAVENOUS; SUBCUTANEOUS PRN
Status: DISCONTINUED | OUTPATIENT
Start: 2024-04-03 | End: 2024-04-19 | Stop reason: HOSPADM

## 2024-04-03 RX ORDER — SODIUM CHLORIDE 9 MG/ML
INJECTION, SOLUTION INTRAVENOUS CONTINUOUS
Status: ACTIVE | OUTPATIENT
Start: 2024-04-03 | End: 2024-04-04

## 2024-04-03 RX ORDER — FENTANYL CITRATE 50 UG/ML
INJECTION, SOLUTION INTRAMUSCULAR; INTRAVENOUS PRN
Status: COMPLETED | OUTPATIENT
Start: 2024-04-03 | End: 2024-04-03

## 2024-04-03 RX ORDER — HYDROCODONE BITARTRATE AND ACETAMINOPHEN 5; 325 MG/1; MG/1
2 TABLET ORAL EVERY 4 HOURS PRN
Status: DISCONTINUED | OUTPATIENT
Start: 2024-04-03 | End: 2024-04-19 | Stop reason: HOSPADM

## 2024-04-03 RX ORDER — MIDAZOLAM HYDROCHLORIDE 1 MG/ML
INJECTION INTRAMUSCULAR; INTRAVENOUS PRN
Status: COMPLETED | OUTPATIENT
Start: 2024-04-03 | End: 2024-04-03

## 2024-04-03 RX ORDER — SENNOSIDES A AND B 8.6 MG/1
2 TABLET, FILM COATED ORAL NIGHTLY
Status: DISCONTINUED | OUTPATIENT
Start: 2024-04-03 | End: 2024-04-19 | Stop reason: HOSPADM

## 2024-04-03 RX ORDER — LIDOCAINE 4 G/G
3 PATCH TOPICAL DAILY
Status: DISCONTINUED | OUTPATIENT
Start: 2024-04-03 | End: 2024-04-19 | Stop reason: HOSPADM

## 2024-04-03 RX ORDER — HYDROCODONE BITARTRATE AND ACETAMINOPHEN 5; 325 MG/1; MG/1
1 TABLET ORAL EVERY 4 HOURS PRN
Status: DISCONTINUED | OUTPATIENT
Start: 2024-04-03 | End: 2024-04-19 | Stop reason: HOSPADM

## 2024-04-03 RX ADMIN — DULOXETINE HYDROCHLORIDE 20 MG: 20 CAPSULE, DELAYED RELEASE ORAL at 00:08

## 2024-04-03 RX ADMIN — CEFTRIAXONE SODIUM 2000 MG: 2 INJECTION, POWDER, FOR SOLUTION INTRAMUSCULAR; INTRAVENOUS at 16:23

## 2024-04-03 RX ADMIN — FAMOTIDINE 20 MG: 20 TABLET, FILM COATED ORAL at 20:27

## 2024-04-03 RX ADMIN — MIDAZOLAM 0.5 MG: 1 INJECTION INTRAMUSCULAR; INTRAVENOUS at 11:00

## 2024-04-03 RX ADMIN — CEFTRIAXONE SODIUM 2000 MG: 2 INJECTION, POWDER, FOR SOLUTION INTRAMUSCULAR; INTRAVENOUS at 04:18

## 2024-04-03 RX ADMIN — SENNOSIDES 17.2 MG: 8.6 TABLET ORAL at 20:27

## 2024-04-03 RX ADMIN — HYDROCODONE BITARTRATE AND ACETAMINOPHEN 2 TABLET: 5; 325 TABLET ORAL at 09:40

## 2024-04-03 RX ADMIN — AMLODIPINE BESYLATE 5 MG: 5 TABLET ORAL at 08:16

## 2024-04-03 RX ADMIN — ROPINIROLE HYDROCHLORIDE 1 MG: 1 TABLET, FILM COATED ORAL at 20:30

## 2024-04-03 RX ADMIN — CYCLOBENZAPRINE 10 MG: 10 TABLET, FILM COATED ORAL at 11:54

## 2024-04-03 RX ADMIN — DEXAMETHASONE SODIUM PHOSPHATE 4 MG: 4 INJECTION, SOLUTION INTRA-ARTICULAR; INTRALESIONAL; INTRAMUSCULAR; INTRAVENOUS; SOFT TISSUE at 22:18

## 2024-04-03 RX ADMIN — HYDROCODONE BITARTRATE AND ACETAMINOPHEN 2 TABLET: 5; 325 TABLET ORAL at 14:03

## 2024-04-03 RX ADMIN — HYDROCODONE BITARTRATE AND ACETAMINOPHEN 1 TABLET: 5; 325 TABLET ORAL at 04:11

## 2024-04-03 RX ADMIN — LIDOCAINE HYDROCHLORIDE 4 ML: 20 INJECTION, SOLUTION EPIDURAL; INFILTRATION; INTRACAUDAL; PERINEURAL at 11:08

## 2024-04-03 RX ADMIN — DULOXETINE HYDROCHLORIDE 20 MG: 20 CAPSULE, DELAYED RELEASE ORAL at 20:29

## 2024-04-03 RX ADMIN — SODIUM CHLORIDE, POTASSIUM CHLORIDE, SODIUM LACTATE AND CALCIUM CHLORIDE 500 ML: 600; 310; 30; 20 INJECTION, SOLUTION INTRAVENOUS at 00:02

## 2024-04-03 RX ADMIN — HYDROCODONE BITARTRATE AND ACETAMINOPHEN 1 TABLET: 5; 325 TABLET ORAL at 20:27

## 2024-04-03 RX ADMIN — SODIUM CHLORIDE, PRESERVATIVE FREE 10 ML: 5 INJECTION INTRAVENOUS at 20:27

## 2024-04-03 RX ADMIN — FENTANYL CITRATE 25 MCG: 50 INJECTION, SOLUTION INTRAMUSCULAR; INTRAVENOUS at 11:09

## 2024-04-03 RX ADMIN — AMPICILLIN SODIUM 2000 MG: 2 INJECTION, POWDER, FOR SOLUTION INTRAMUSCULAR; INTRAVENOUS at 08:14

## 2024-04-03 RX ADMIN — VANCOMYCIN HYDROCHLORIDE 1000 MG: 1 INJECTION, POWDER, LYOPHILIZED, FOR SOLUTION INTRAVENOUS at 06:22

## 2024-04-03 RX ADMIN — DEXAMETHASONE SODIUM PHOSPHATE 4 MG: 4 INJECTION, SOLUTION INTRA-ARTICULAR; INTRALESIONAL; INTRAMUSCULAR; INTRAVENOUS; SOFT TISSUE at 16:21

## 2024-04-03 RX ADMIN — HYDROCODONE BITARTRATE AND ACETAMINOPHEN 2 TABLET: 5; 325 TABLET ORAL at 00:08

## 2024-04-03 RX ADMIN — DEXAMETHASONE SODIUM PHOSPHATE 4 MG: 4 INJECTION, SOLUTION INTRA-ARTICULAR; INTRALESIONAL; INTRAMUSCULAR; INTRAVENOUS; SOFT TISSUE at 04:11

## 2024-04-03 RX ADMIN — SODIUM CHLORIDE: 9 INJECTION, SOLUTION INTRAVENOUS at 09:40

## 2024-04-03 RX ADMIN — MIDAZOLAM 0.5 MG: 1 INJECTION INTRAMUSCULAR; INTRAVENOUS at 11:09

## 2024-04-03 RX ADMIN — ROPINIROLE HYDROCHLORIDE 1 MG: 1 TABLET, FILM COATED ORAL at 00:08

## 2024-04-03 RX ADMIN — DOCUSATE SODIUM 100 MG: 100 CAPSULE, LIQUID FILLED ORAL at 20:27

## 2024-04-03 RX ADMIN — DEXAMETHASONE SODIUM PHOSPHATE 4 MG: 4 INJECTION, SOLUTION INTRA-ARTICULAR; INTRALESIONAL; INTRAMUSCULAR; INTRAVENOUS; SOFT TISSUE at 11:54

## 2024-04-03 RX ADMIN — AMPICILLIN SODIUM 2000 MG: 2 INJECTION, POWDER, FOR SOLUTION INTRAMUSCULAR; INTRAVENOUS at 03:34

## 2024-04-03 RX ADMIN — FENTANYL CITRATE 25 MCG: 50 INJECTION, SOLUTION INTRAMUSCULAR; INTRAVENOUS at 11:00

## 2024-04-03 ASSESSMENT — PAIN DESCRIPTION - ONSET
ONSET: ON-GOING
ONSET: ON-GOING

## 2024-04-03 ASSESSMENT — PAIN DESCRIPTION - LOCATION
LOCATION: BACK
LOCATION: BACK;BUTTOCKS
LOCATION: BACK

## 2024-04-03 ASSESSMENT — PAIN SCALES - GENERAL
PAINLEVEL_OUTOF10: 7
PAINLEVEL_OUTOF10: 3
PAINLEVEL_OUTOF10: 0
PAINLEVEL_OUTOF10: 6
PAINLEVEL_OUTOF10: 2
PAINLEVEL_OUTOF10: 0
PAINLEVEL_OUTOF10: 0
PAINLEVEL_OUTOF10: 2
PAINLEVEL_OUTOF10: 7
PAINLEVEL_OUTOF10: 10
PAINLEVEL_OUTOF10: 2
PAINLEVEL_OUTOF10: 3
PAINLEVEL_OUTOF10: 9
PAINLEVEL_OUTOF10: 3
PAINLEVEL_OUTOF10: 6
PAINLEVEL_OUTOF10: 0
PAINLEVEL_OUTOF10: 6
PAINLEVEL_OUTOF10: 5
PAINLEVEL_OUTOF10: 0
PAINLEVEL_OUTOF10: 2
PAINLEVEL_OUTOF10: 0

## 2024-04-03 ASSESSMENT — PAIN SCALES - WONG BAKER
WONGBAKER_NUMERICALRESPONSE: NO HURT

## 2024-04-03 ASSESSMENT — PAIN DESCRIPTION - FREQUENCY
FREQUENCY: CONTINUOUS
FREQUENCY: CONTINUOUS

## 2024-04-03 ASSESSMENT — PAIN DESCRIPTION - PAIN TYPE
TYPE: ACUTE PAIN
TYPE: ACUTE PAIN

## 2024-04-03 ASSESSMENT — PAIN DESCRIPTION - ORIENTATION
ORIENTATION: LOWER;MID
ORIENTATION: LOWER
ORIENTATION: LOWER
ORIENTATION: LOWER;MID
ORIENTATION: LOWER
ORIENTATION: LOWER
ORIENTATION: LOWER;MID

## 2024-04-03 ASSESSMENT — PAIN DESCRIPTION - DESCRIPTORS
DESCRIPTORS: ACHING
DESCRIPTORS: ACHING;SHARP
DESCRIPTORS: ACHING;SHARP
DESCRIPTORS: ACHING;DULL
DESCRIPTORS: ACHING

## 2024-04-03 ASSESSMENT — PAIN - FUNCTIONAL ASSESSMENT
PAIN_FUNCTIONAL_ASSESSMENT: PREVENTS OR INTERFERES WITH MANY ACTIVE NOT PASSIVE ACTIVITIES
PAIN_FUNCTIONAL_ASSESSMENT: PREVENTS OR INTERFERES WITH MANY ACTIVE NOT PASSIVE ACTIVITIES
PAIN_FUNCTIONAL_ASSESSMENT: PREVENTS OR INTERFERES SOME ACTIVE ACTIVITIES AND ADLS
PAIN_FUNCTIONAL_ASSESSMENT: PREVENTS OR INTERFERES SOME ACTIVE ACTIVITIES AND ADLS
PAIN_FUNCTIONAL_ASSESSMENT: PREVENTS OR INTERFERES WITH MANY ACTIVE NOT PASSIVE ACTIVITIES

## 2024-04-03 NOTE — PROGRESS NOTES
[facility]  INPATIENT SPEECH THERAPY  STRZ CCU 3A  DAILY NOTE    TIME   SLP Individual Minutes  Time In: 1007  Time Out: 1015  Minutes: 8  Timed Code Treatment Minutes: 8 Minutes       Date: 4/3/2024  Patient Name: Alpa Marte      CSN: [id number]   : 1955  (68 y.o.)  Gender: female   Referring Physician:  David Chatman DO   Diagnosis: Adjacent segment disease of lumbar spine with history of fusion procedure  Precautions: Fall risk, aspiration precautions, lumbar drain   Current Diet: Regular, thin liquids  Respiratory Status: Room Air  Swallowing Strategies: Full Upright Position, Small Bite/Sip, Pulmonary Monitoring, and Alternate Solids and Liquids     Date of Last MBS/FEES: Not Applicable    Pain:  10/10 - Pain location: lumbar region (nursing aware and addressing concerns)    Subjective:  JAMES Sotelo with approval to proceed with session, indications conveyed for worsening in cognitive status and comfort levels in comparison to prior date; per chart review, needs for IR procedure d/t CSF leak and kinked catheter from lumbar drain in addition to documented findings for E.coli in the CSF;\" nocturnal attending resident with documentation for, \"this may be the cause of her altered mentation\".     Upon arrival, patient in recliner chair with overt restless behavior and frequent c/o pain in lumbar region; live sitter at bedside. Needs for shortened session d/t degree of pain and subsequent t/f from recliner<>bed placement.     Short-Term Goals:  SHORT TERM GOAL #1:  Goal 1: Patient will complete immediate/delayed recall tasks with 70% accuracy, mod cues to improve retention of pertinent, novel information.  INTERVENTIONS: Orientation: 5/6 independent, 1/6 mod cues    Functional carryover  -Call light: max cues required to locate and discern established safety device with simulation x1 conducted in attempts to promote carryover; do not anticipate generalized usage d/t degree of confusion  presenting, live sitter maintains    *notable decline at date; should persistent deficits maintain, recommend modifying POC    SHORT TERM GOAL #2:  Goal 2: Patient will complete mildly complex verbal/visual reasoning, thought organization, and executive functioning tasks (household obligations, vocational employment) with 70% accuracy, mod cues to improve contributions within daily living requirements.  INTERVENTIONS: Problem solving  -Call light justifications: 1/3 independent, 2/3 max cues    SHORT TERM GOAL #3:  Goal 3: Patient will complete higher level attention tasks with no more than 5 errors/re-directions until task completion to permit potential return to multi-tasking, IADLs, driving, and work-related responsibilities.  INTERVENTIONS: Consistent need for re-directions d/t significantly compromised mental focus; various success for re-directions to permit efficiency for task progression to completion, specifically within functional carryover tasks in STG1.     SHORT TERM GOAL #4:  Goal 4: Patient will consume baseline diet of regular solids/thin liquids with stable pulmonary status and incorporation of trained compensatory strategies to assist with nutrition/hydration measures.  INTERVENTIONS: (Nonbillable time); informal observation for consumption of thin juice via straw without overt distress. Nursing reporting lack of oral consumption of meals on prior date as well as with breakfast this date d/t c/o pain and various medical factors.    Long-Term Goals:  No LTG established given short ELOS    Functional Oral Intake Scale: Total Oral Intake: 7.  Total oral intake with no restrictions    EDUCATION:  Learner: Patient  Education:  Reviewed ST goals and Plan of Care and Reviewed recommendations for follow-up  Evaluation of Education: Demonstrates with assistance, Needs further instruction, and Family not present    ASSESSMENT/PLAN:  Activity Tolerance:  Patient tolerance of  treatment: fair. Significant

## 2024-04-03 NOTE — PROGRESS NOTES
1915- Writer and vivek RN at bedside for bedside shift report. Pt staring off, withdrawn, and fidgety, /112 . Pt is able to answer some orientation questions. Kelly Lassiter CNP, contacted and updated over the phone.   1920- SAMANTHA Lassiter, and Gato Caballero, , at bedside to assess. New orders placed- see MAR.   1940- Writer assessed lumbar drain- dressing saturated, leaking onto bed pad under pt. Dr. Caballero updated.   1945- MYRNA Lopez, contacted and updated on lumbar drain. Notified that drain has low output- see flowsheets. Also updated on vitals and mentation.   2050- MYRNA Lopez, at bedside. Site, drain, and incision assessed. Drain is not working properly due to a kink in catheter and will need to be assessed by IR tomorrow.   2145- Dr. Caballero updated on bladder scan of 0ml. Pt has low urine output- see flowsheets.

## 2024-04-03 NOTE — H&P
[facility]  Sedation/Analgesia History & Physical    Pt Name: Alpa Marte  MRN: [id number]  YOB: 1955  Provider Performing Procedure: Umesh Fernandez MD, MD  Primary Care Physician: Connie Augustin PA    Formulation and discussion of sedation / procedure plans, risks, benefits, side effects and alternatives with patient and/or responsible adult completed.    PRE-PROCEDURE   DNR-CCA/DNR-CC []Yes [x]No  Brief History/Pre-Procedure Diagnosis: post op CSF leak, nonfunctioning drainage cth           MEDICAL HISTORY  []CAD/Valve  []Liver Disease  []Lung Disease []Diabetes  []Hypertension []Renal Disease  []Additional information:       has a past medical history of Arthritis and Restless leg syndrome.    SURGICAL HISTORY   has a past surgical history that includes Appendectomy (1978); lumbar fusion (1998); lumbar fusion (2003); Cholecystectomy (2006); Wrist surgery (Left, 2004); and lumbar fusion (N/A, 3/26/2024).  Additional information:       ALLERGIES   Allergies as of 03/11/2024    (No Known Allergies)     Additional information:       MEDICATIONS   Coumadin Use Last 5 Days [x]No []Yes  Antiplatelet drug therapy use last 5 days  [x]No []Yes  Other anticoagulant use last 5 days  [x]No []Yes    Current Facility-Administered Medications:     DULoxetine (CYMBALTA) extended release capsule 20 mg, 20 mg, Oral, Nightly, Caballero, Gato, DO, 20 mg at 04/03/24 0008    rOPINIRole (REQUIP) tablet 1 mg, 1 mg, Oral, Nightly, Caballero Gato, DO, 1 mg at 04/03/24 0008    0.9 % sodium chloride infusion, , IntraVENous, Continuous, David Chatman, , Last Rate: 75 mL/hr at 04/03/24 0940, New Bag at 04/03/24 0940    cefTRIAXone (ROCEPHIN) 2,000 mg in sodium chloride 0.9 % 50 mL IVPB (mini-bag), 2,000 mg, IntraVENous, Q12H, David Chatman DO, Stopped at 04/03/24 0448    hydrALAZINE (APRESOLINE) injection 5 mg, 5 mg, IntraVENous, Q6H PRN, Gato Caballero, DO, 5 mg at 04/02/24 2111    HYDROcodone-acetaminophen

## 2024-04-03 NOTE — PROGRESS NOTES
1040 Patient received in IR for lumbar drain evaluation. This procedure has been fully reviewed with the patient and verbal informed consent has been obtained.  1049 Procedure started with Dr. Fernandez.   1052 Per Dr Fernandez, there are kinks noted in patient's lumbar drain tubing so drain will need exchanged for a new one.  1100 Lumbar drain exchange has been fully reviewed with the patient and written informed consent has been obtained.   1103 Procedure started with Dr Fernandez.   1102 Procedure started with Dr Fernandez.   1110 Procedure completed; patient tolerated well. Dressing to exit site; no bleeding noted.  1118 Patient on bed; comfort ensured.  1122 Report called to Ashleigh RAMIREZ on 3A.   1130 Patient taken to 3A via Brandy RAMIREZ and Ashleigh RN.

## 2024-04-03 NOTE — PLAN OF CARE
Name: Alpa Marte  YOB: 1955  MRN: [id number]  Date: 04/02/24     Plan of Care    Was notified by RN of change in patient's mentation this evening as well as increased drainage of serosanguineous fluid from previous lumbar drain site requiring katlyn pad change almost every hour.  Patient's incision site and lumbar drain site was evaluated with Santino NORRIS with neurosurgery and noted to be nonerythematous, nontender and flat.  No drainage noted from incision site.  There is some drainage from around the current lumbar drain and kink in drain was noted which is likely causing lower output.  Patient did grow E. coli in the CSF and is being treated with Rocephin 2 g every 12 hours.  This may be the cause of her altered mentation.  She is having hallucinations and is acutely encephalopathic however is awake and alert.  No focal deficits noted on exam. Awaiting final cultures and sensitivities.  Can consider ID consult.  Neurosurgery planning on repeating a CT lumbar spine in the AM.  Also spoke with interventional radiology Dr. Fernandez who will evaluate the drain under fluoroscopy in the a.m.  Will continue to monitor patient's blood pressure and mentation overnight as her blood pressure has been slowly rising this evening.  As needed IV Lopressor was given with subsequent improvement of tachycardia and minimal BP improvement.  Will give 1 dose IV hydralazine and monitor BP. EKG also ordered.     Electronically signed by Gato Caballero DO on 4/2/2024 at 9:17 PM

## 2024-04-03 NOTE — PROGRESS NOTES
[facility]  INPATIENT PHYSICAL THERAPY  DAILY NOTE  STRZ CCU 3A - 3A-03/003-A      Time In: 0850  Time Out: 0930  Timed Code Treatment Minutes: 40 Minutes  Minutes: 40          Date: 4/3/2024  Patient Name: Alpa Marte,  Gender:  female        MRN: [id number]  : 1955  (68 y.o.)     Referring Practitioner: Santino Stewart PA-C  Diagnosis: Adjacent segment disease of lumbar spine with history of fusion procedure  Additional Pertinent Hx: Per HPI, \"Alpa Marte is an 68 y.o.  female, an every day smoker tobacco and 1/2 pack day history who admits to regular alcohol use and has a medical history significant for restless leg syndrome and urinary incontinence with a surgical history that includes prior lumbar fusions in  and again in  performed at [facility] resulting in pedicle screw and garima instrumentation at lumbar 4 spanning to S1.  She presents today unaccompanied and ambulating without assistance with complaints of chronic worsening low back pain radiating primarily to the left lower extremity all the way to the foot including chronic weakness and chronic partial left foot drop.  She does not take any medication other than over-the-counter medications and treats with ice and heat and rest.  In the past she has been treated by pain specialist who provided some injection therapies (greater than 20 years prior) that provided only transient relief. A CT scan imaged on 2024 that reflect pedicle screws from L4-S1 along with laminectomies and a grade 3 anterior listhesis of L5 on S1 unchanged. The flexion-extension x-ray also shows the grade 3 to grade 4 anterior listhesis of L5 on S1 with no evidence of additional change in flexion or extension. SI joint imaging reveals some narrowing along with sclerosis for SI joints bilaterally.\"  Pt is now s/p Reexploration and Revision of Lumbar Instrumented Fusion for Extension of the Decompression and Instrumentation

## 2024-04-03 NOTE — PROGRESS NOTES
[facility]  OCCUPATIONAL THERAPY MISSED TREATMENT NOTE  STRZ CCU 3A  3A-03/003-A      Date: 4/3/2024  Patient Name: Alpa Marte        CSN: [id number]   : 1955  (68 y.o.)  Gender: female   Referring Practitioner: Santino Stewart PA-C  Diagnosis: Adjacent segment disease of lumbar spine with history of fusion procedure         REASON FOR MISSED TREATMENT:  Attempt x 1 ST with Pt. Attempt x 2, Pt at IR for drain exchange. Will check back as time allows.

## 2024-04-03 NOTE — CONSULTS
Pain Consult Note      Admitting Physician: Brannon Castaneda MD    Primary Care Provider: Connie Augustin PA     Reason for Consult:  \"Post op pain control\"    History of Present Illness:  Alpa Marte is a 68 y.o. female admitted to [facility] on 3/26/2024.  This patient with a past medical history significant for arthritis, restless legs, and chronic low back pain with 2 previous lumbar fusions who follows with neurosurgery who presented to [facility] for a planned lumbar surgery. She had lumbar fusion in 1989 and again in 2003 done at [facility] resulting in pedicle screw and garima instrumentation at lumbar 4 spanning to S1. Patient presented to Dr. Caldera for recurring lumbar pain with radicular pain into the left leg. Patient noted left leg weakness and partial left foot drop. CT scan imaged on 2/21/2024 that reflect pedicle screws from L4-S1 along with laminectomies and a grade 3 anterior listhesis of L5 on S1 unchanged. The flexion-extension x-ray also shows the grade 3 to grade 4 anterior listhesis of L5 on S1 with no evidence of additional change in flexion or extension. SI joint imaging revealed narrowing along with sclerosis for SI joints bilaterally.  MRI of lumbar spine indicated \"Previous lumbar spine fusion from L4 through S1.  Grade 2 spondylolisthesis with spondylolysis of L5 on S1.  Disc and osteophytes narrow the neural foramina throughout the lumbar region as discussed in detail above.  No significant stenosis of the thecal sac in the lumbar region. \"  Patient elected to move forward with lumbar revision. Patient underwent Reexploration and Revision of Lumbar Instrumented Fusion for Extension of the Decompression and Instrumentation to L3-Pelvis with Dr Caldera on 3/26/24. Patient with intra-op dura leak that was repaired. Following surgery she had developed episodes of staring spells and twitching, and shortly thereafter had EEG 3/29 showing  thou/mm3    MPV 11.4 9.4 - 12.4 fL    Seg Neutrophils 87.0 %    Lymphocytes 2.1 %    Monocytes 8.7 %    Eosinophils 0.0 %    Basophils 0.4 %    Immature Granulocytes 1.8 %    Atypical Lymphocytes OCC. %    Platelet Estimate ADEQUATE Adequate    Segs Absolute 33.6 (H) 1.8 - 7.7 thou/mm3    Lymphocytes Absolute 0.8 (L) 1.0 - 4.8 thou/mm3    Monocytes Absolute 3.4 (H) 0.4 - 1.3 thou/mm3    Eosinophils Absolute 0.0 0.0 - 0.4 thou/mm3    Basophils Absolute 0.2 (H) 0.0 - 0.1 thou/mm3    Immature Grans (Abs) 0.71 (H) 0.00 - 0.07 thou/mm3    nRBC 0 /100 wbc    Anisocytosis Present Absent    Poikilocytes 1+ Absent    Spherocytes 1+ Absent   Scan of Blood Smear    Collection Time: 04/02/24  4:30 PM   Result Value Ref Range    SCAN OF BLOOD SMEAR see below    POCT Glucose    Collection Time: 04/02/24  8:10 PM   Result Value Ref Range    POC Glucose 114 (H) 70 - 108 mg/dl   EKG 12 Lead    Collection Time: 04/02/24  9:33 PM   Result Value Ref Range    Ventricular Rate 97 BPM    Atrial Rate 97 BPM    P-R Interval 144 ms    QRS Duration 84 ms    Q-T Interval 334 ms    QTc Calculation (Bazett) 424 ms    R Axis 131 degrees    T Axis 108 degrees   Basic Metabolic Panel    Collection Time: 04/03/24  6:21 AM   Result Value Ref Range    Sodium 134 (L) 135 - 145 meq/L    Potassium 4.4 3.5 - 5.2 meq/L    Chloride 98 98 - 111 meq/L    CO2 18 (L) 23 - 33 meq/L    Glucose 116 (H) 70 - 108 mg/dL    BUN 39 (H) 7 - 22 mg/dL    Creatinine 0.9 0.4 - 1.2 mg/dL    Calcium 9.0 8.5 - 10.5 mg/dL   CBC with Auto Differential    Collection Time: 04/03/24  6:21 AM   Result Value Ref Range    WBC 28.7 (H) 4.8 - 10.8 thou/mm3    RBC 4.08 (L) 4.20 - 5.40 mill/mm3    Hemoglobin 13.9 12.0 - 16.0 gm/dl    Hematocrit 38.5 37.0 - 47.0 %    MCV 94.4 81.0 - 99.0 fL    MCH 34.1 (H) 26.0 - 33.0 pg    MCHC 36.1 (H) 32.2 - 35.5 gm/dl    RDW-CV 13.1 11.5 - 14.5 %    RDW-SD 44.3 35.0 - 45.0 fL    Platelets 221 130 - 400 thou/mm3    MPV 11.6 9.4 - 12.4 fL    Seg  plaque in the proximal left subclavian artery. There is atherosclerotic calcification in the aortic arch.  5. There is atherosclerotic calcification in the cavernous segments of both internal carotid arteries. There is no evidence of severe stenosis.  6. The right and left superior cerebellar arteries are not clearly seen.  7. Otherwise negative CTA of the head and neck.     MRI of brain:   FINDINGS:    There is a 12 x 7 mm area of abnormal signal intensity in the left cerebellar hemisphere with associated susceptibility artifact consistent with an acute hematoma. There is abnormal signal intensity along the tentorium cerebelli bilaterally consistent  with hemorrhage.     There is a small susceptibility artifact in the subdural space over the right frontal lobe.    The diffusion-weighted images are normal.  The brain volume is slightly reduced. There is a mild amount of signal hyperintensity on the FLAIR and T2-weighted sequences in the white matter of the brain. This is consistent with mild severity chronic small  vessel ischemic changes.    There  is no hydrocephalus, midline shift or mass effect.    There is mineralization in  basal ganglia and cerebral peduncles bilaterally.    The major intracranial vascular flow voids are present.    The midline craniocervical junction structures are normal.  The pituitary gland and brainstem are normal.    There are mild inflammatory changes in ethmoid air cells and mastoid air cells bilaterally and in the left maxillary sinus.        Impression:          1. 12 x 7 mm area of acute hemorrhage in the left cerebellar hemisphere.  2. There is hemorrhage along the tentorium cerebelli bilaterally.  3. There is a small area of susceptibility artifact in the subdural space over the right frontal lobe.  4. There is mild atrophy and probable ischemic changes in the white matter. There is no evidence for an acute infarct.  5. There are inflammatory changes in ethmoid air cells and

## 2024-04-03 NOTE — PLAN OF CARE
Problem: Discharge Planning  Goal: Discharge to home or other facility with appropriate resources  Outcome: Progressing     Problem: Pain  Goal: Verbalizes/displays adequate comfort level or baseline comfort level  Outcome: Progressing     Problem: ABCDS Injury Assessment  Goal: Absence of physical injury  Outcome: Progressing     Problem: Safety - Adult  Goal: Free from fall injury  Outcome: Progressing     Problem: Nutrition Deficit:  Goal: Optimize nutritional status  Outcome: Progressing     Problem: Skin/Tissue Integrity  Goal: Absence of new skin breakdown  Description: 1.  Monitor for areas of redness and/or skin breakdown  2.  Assess vascular access sites hourly  3.  Every 4-6 hours minimum:  Change oxygen saturation probe site  4.  Every 4-6 hours:  If on nasal continuous positive airway pressure, respiratory therapy assess nares and determine need for appliance change or resting period.  Outcome: Progressing     Problem: Confusion  Goal: Confusion, delirium, dementia, or psychosis is improved or at baseline  Description: INTERVENTIONS:  1. Assess for possible contributors to thought disturbance, including medications, impaired vision or hearing, underlying metabolic abnormalities, dehydration, psychiatric diagnoses, and notify attending LIP  2. [place] high risk fall precautions, as indicated  3. Provide frequent short contacts to provide reality reorientation, refocusing and direction  4. Decrease environmental stimuli, including noise as appropriate  5. Monitor and intervene to maintain adequate nutrition, hydration, elimination, sleep and activity  6. If unable to ensure safety without constant attention obtain sitter and review sitter guidelines with assigned personnel  7. Initiate Psychosocial CNS and Spiritual Care consult, as indicated  Outcome: Progressing  Flowsheets (Taken 4/2/2024 1930)  Effect of thought disturbance (confusion, delirium, dementia, or psychosis) are managed with adequate  medications as needed     Problem: Neurosensory - Adult  Goal: Achieves stable or improved neurological status  Outcome: Progressing  Flowsheets (Taken 4/2/2024 1930)  Achieves stable or improved neurological status:   Assess for and report changes in neurological status   Initiate measures to prevent increased intracranial pressure   Maintain blood pressure and fluid volume within ordered parameters to optimize cerebral perfusion and minimize risk of hemorrhage   Monitor temperature, glucose, and sodium. Initiate appropriate interventions as ordered  Goal: Achieves maximal functionality and self care  Outcome: Progressing

## 2024-04-03 NOTE — CARE COORDINATION
4/3/24, 2:03 PM EDT    DISCHARGE ON GOING EVALUATION    [facility] day: 8  Location: 3A-03/003-A Reason for admit: Adjacent segment disease of lumbar spine with history of fusion procedure [M51.36, Z98.1]  S/P lumbar fusion [Z98.1]  Meningitis [G03.9]   Procedure:   3/26 Reexploration and Revision of Lumbar Instrumented Fusion for Extension of the Decompression and Instrumentation to L3-Pelvis    3/29 lumbar drain removed  3/31 CTA head and neck:   1. There is plaque involving the left carotid bifurcation. There is no significant hemodynamic stenosis in the left common and internal carotid arteries.  2. There is no significant hemodynamic stenosis in the right common and internal carotid arteries.  3. There is antegrade flow in the right and left vertebral arteries.  4. There is plaque in the proximal left subclavian artery. There is atherosclerotic calcification in the aortic arch.  5. There is atherosclerotic calcification in the cavernous segments of both internal carotid arteries. There is no evidence of severe stenosis.  6. The right and left superior cerebellar arteries are not clearly seen.  7. Otherwise negative CTA of the head and neck.  3/31 MRI brain:   1. 12 x 7 mm area of acute hemorrhage in the left cerebellar hemisphere.  2. There is hemorrhage along the tentorium cerebelli bilaterally.  3. There is a small area of susceptibility artifact in the subdural space over the right frontal lobe.   4. There is mild atrophy and probable ischemic changes in the white matter. There is no evidence for an acute infarct.  5. There are inflammatory changes in ethmoid air cells and mastoid air cells bilaterally and in the left maxillary sinus.  4/1 Lumbar CSF drain placed  4/3 CSF Lumbar drain replaced in IR    Barriers to Discharge: CSF +EColi. Lumbar drain replaced in IR today d/t kinked catheter. Pain Management consulted. Afebrile. NSR. On room air. Ox4. Follows commands. Lumbar drain @ 15

## 2024-04-03 NOTE — PROGRESS NOTES
And    [Held by provider] gabapentin  900 mg Oral Nightly    sodium chloride flush  5-40 mL IntraVENous 2 times per day    dexAMETHasone  4 mg IntraVENous Q6H     Continuous Infusions:   sodium chloride      sodium chloride       PRN Meds:hydrALAZINE, HYDROcodone 5 mg - acetaminophen, ondansetron, labetalol, sodium chloride flush, sodium chloride, HYDROcodone 5 mg - acetaminophen, cyclobenzaprine    Objective: Patient is resting comfortably on her right side with pain better controlled today.  Patient was also seen last evening where a dressing change was performed with very little saturation to dressings noted.  There was no dehiscence or discharge from the incision site or drain site and those dressings were inspected and remained dry this morning.    The lumbar drain reveals evidence of having been kinked which is compromising his ability to function properly.  IR is consulted to revise the drain.    Vitals: BP (!) 151/77   Pulse 92   Temp 97.3 °F (36.3 °C)   Resp 17   Ht 1.651 m (5' 5\")   Wt 45.1 kg (99 lb 6.8 oz)   SpO2 98%   BMI 16.55 kg/m²     Physical Exam     Physical Exam:  Patient is somnolent but arousable and will obey commands appropriately.  Language appropriate, with no aphasia.  Pupils equal.  Facial strength symmetric.    Review of Systems   A comprehensive review of systems unobtainable due to patient's status, confusion.  24 hour intake/output:  Intake/Output Summary (Last 24 hours) at 4/3/2024 0923  Last data filed at 4/3/2024 0817  Gross per 24 hour   Intake 1387.6 ml   Output 1064.6 ml   Net 323 ml     Last 3 weights:  Wt Readings from Last 3 Encounters:   04/03/24 45.1 kg (99 lb 6.8 oz)   03/11/24 48.6 kg (107 lb 2.3 oz)   01/15/24 48.6 kg (107 lb 3.2 oz)         CBC:   Recent Labs     04/02/24  0600 04/02/24  1630 04/03/24  0621   WBC 30.6* 38.6* 28.7*   HGB 14.7 14.7 13.9    272 221     BMP:    Recent Labs     04/01/24  0635 04/02/24  0600 04/03/24  0621    130* 134*   K  4.4 4.8 4.4   CL 97* 95* 98   CO2 23 23 18*   BUN 22 22 39*   CREATININE 0.5 0.5 0.9   GLUCOSE 114* 116* 116*     Calcium:  Recent Labs     04/03/24  0621   CALCIUM 9.0     Magnesium:  Recent Labs     04/03/24  0621   MG 2.0     Glucose:  Recent Labs     04/01/24  1913 04/02/24 2010   POCGLU 125* 114*     HgbA1C: No results for input(s): \"LABA1C\" in the last 72 hours.  Lipids: No results for input(s): \"CHOL\", \"TRIG\", \"HDL\", \"LDL\", \"LDLCALC\" in the last 72 hours.    Radiology reports as per the Radiologist  Radiology: XR LUMBAR SPINE (2-3 VIEWS)    Result Date: 3/27/2024  PROCEDURE: XR LUMBAR SPINE (2-3 VIEWS) CLINICAL INFORMATION: Reexploration and Revision of Lumbar Instrumented Fusion for Extension of the Decompression and Instrumentation to L3-Pelvis (Head), COMPARISON: No prior study. TECHNIQUE: AP and lateral views of the lumbar spine were obtained intraoperatively. FINDINGS: There are bilateral pedicle screws in L3, L4, L5, S1 and traversing both sacroiliac joints. The hardware appears intact.      Intraoperative images for level localization. **This report has been created using voice recognition software. It may contain minor errors which are inherent in voice recognition technology.** Final report electronically signed by Dr. Luz Marina Robison on 3/27/2024 11:52 AM    FLUORO FOR SURGICAL PROCEDURES    Result Date: 3/26/2024  Radiology exam is complete. No Radiologist dictation. Please follow up with ordering provider.                    Assessment: Postoperative day 8 from reexploration and revision of lumbar fusion for additional decompression and extension of instrumentation.    Principal Problem:    S/P lumbar fusion  Active Problems:    Transient alteration of awareness    Intracranial bleeding (HCC)    Cerebellar hemorrhage (HCC)    Severe malnutrition (HCC)    Meningitis  Resolved Problems:    * No resolved hospital problems. *        Plan: Patient is seen and evaluated on 3A with evaluation and exam  findings reviewed and discussed with Dr. Caldera/neurosurgeon and the nursing.  Patient is resting comfortably today with pain better controlled.  Dressings that were changed last evening remain intact over a flat dry incision with very little discharge noted to the dressing.  Of note: The lumbar drain, when examined, shows evidence of having kinked which limits its flow and proper function.  IR is notified with recommendation to revise the drain today.  An infectious disease consult was recommended to address meningitis.  Neurosurgery to follow      Electronically signed by Santino Stewart PA-C on 4/3/2024 at 9:23 AM    Neurosurgery

## 2024-04-03 NOTE — CONSULTS
CONSULTATION NOTE :ID       Patient - Alpa Marte,  Age - 68 y.o.    - 1955      Room Number - 3A-03/003-A   N -  [id number]   [facility] # - [id number]  Date of Admission -  3/26/2024  7:58 AM  Patient's PCP: Connie Augustin PA     Requesting Physician: Brannon Castaneda MD    REASON FOR CONSULTATION   E. coli meningitis  CHIEF COMPLAINT   Worsening lower back pain    HISTORY OF PRESENT ILLNESS       This is a very pleasant 68 y.o. female who was admitted to the hospital with a chief complaints of worsening lower back pain.  She has been in the hospital for the last 9 days.  She has extensive history of degenerative back disease had multiple operations in the past.  She had reexploration and revision of the lumbar instrumented fusion on 3/26/2024 by Dr. Caldera.  After the surgery she had continuous drainage of fluid suggestive of a dural leak.  She has a lumbar drain placed at.  Her CSF was positive for E. coli.  Her blood culture was also sensitive to E. coli.  She has a lumbar drain last functioning well the CSF is clear.  Had abnormal EEG suggestive of encephalopathy.  At the present time her mentation is improved.  Her scan revealed cerebellar hemorrhage.  She is on conservative treatment.  She is currently on appropriate IV antibiotic.  Her medical record was reviewed.  PAST MEDICAL  HISTORY       Past Medical History:   Diagnosis Date    Arthritis     back    Restless leg syndrome        PAST SURGICAL HISTORY     Past Surgical History:   Procedure Laterality Date    APPENDECTOMY      [facility] [place]    CHOLECYSTECTOMY      [facility]    LUMBAR FUSION      [facility]    LUMBAR FUSION      [facility]    LUMBAR FUSION N/A 3/26/2024    Reexploration and Revision of Lumbar Instrumented Fusion for Extension of the Decompression and Instrumentation to L3-Pelvis performed by Charlie Caldera MD at [facility] OR    WRIST SURGERY Left   rash, no skin lesions, no pruritis,  Neurological:no headaches,no dizziness, no seizure, no numbness.  Psychiatry: + depression, no anxiety,no panic attacks, no suicide ideation    PHYSICAL EXAM:     /82   Pulse (!) 102   Temp 98 °F (36.7 °C)   Resp 22   Ht 1.651 m (5' 5\")   Wt 45.1 kg (99 lb 6.8 oz)   SpO2 96%   BMI 16.55 kg/m²   General apperance:  Awake, alert, chronically sick looking, older than the stated age  HEENT: pale  conjunctiva, unicteric sclera, moist oral mucosa.  Chest:  bilateral air entry, diminished breath sound  Cardiovascular:  RRR ,S1S2, no murmur or gallop.  Abdomen:  Soft, non tender to palpation.  Extremities: no rash  Skin:  Warm and dry.  CNS:awake and oriented.  Dressed lower lumbar wound,. Lumbar drain in place        LABS:     CBC:   Recent Labs     04/02/24  0600 04/02/24  1630 04/03/24  0621   WBC 30.6* 38.6* 28.7*   HGB 14.7 14.7 13.9    272 221     BMP:    Recent Labs     04/01/24  0635 04/02/24  0600 04/03/24  0621    130* 134*   K 4.4 4.8 4.4   CL 97* 95* 98   CO2 23 23 18*   BUN 22 22 39*   CREATININE 0.5 0.5 0.9   GLUCOSE 114* 116* 116*     Calcium:  Recent Labs     04/03/24  0621   CALCIUM 9.0     Ionized Calcium:No results for input(s): \"IONCA\" in the last 72 hours.  Magnesium:  Recent Labs     04/03/24  0621   MG 2.0      Glucose:  Recent Labs     04/01/24  1913 04/02/24 2010   POCGLU 125* 114*      Amylase and Lipase:  Recent Labs     04/03/24  0845   LACTA 1.8     Lactic Acid:   Recent Labs     04/03/24  0845   LACTA 1.8        UA:   Recent Labs     04/03/24  1150   SPECGRAV 1.027   PHUR 5.5   COLORU YELLOW   PROTEINU TRACE*   BLOODU NEGATIVE   RBCUA 5-10   WBCUA 5-9   BACTERIA NONE SEEN   NITRU NEGATIVE   BILIRUBINUR NEGATIVE   UROBILINOGEN 0.2   KETUA NEGATIVE   LABCAST NONE SEEN  NONE SEEN        Problem list of patient      Patient Active Problem List   Diagnosis Code    S/P lumbar fusion Z98.1    Moderate malnutrition (HCC) E44.0

## 2024-04-03 NOTE — PROGRESS NOTES
CRITICAL CARE PROGRESS NOTE      Patient:   Alpa Marte    Unit/Bed: 3A-03/003-A  YOB: 1955  MRN:  [id number]   PCP:  Connie Augustin PA  Date of Admission:  3/26/2024    Chief Complaint:  Acute hemorrhage    Assessment and Plan:  Meningitis: Leukocytosis peak 4/2, decreasing.  CSF molecular PCR 4/2 significant for E. coli K1.  Negative streptococci, negative Listeria. 19,780 leukocyte/cc CSF.  Patient encephalopathic beginning 4/2.  No fever, nuchal rigidity. No photophobia.  Status post empiric vancomycin, ampicillin.  Neurosurgery made aware.  Ceftriaxone 2g q12h  High anion gap metabolic acidosis: AG 18, bicarb 18 on 4/3.  Lactic acid  Beta hydroxybutyrate  Antibiotics  Acute kidney injury, stage I: Baseline creatinine 0.5.  A.m. creatinine 0.9.  NS 75cc/hr for 24 h  Am BMP  Hyponatremia, acute:  Saline  Monitor BMP  Acute left cerebellar hemorrhage: On 3/31, 12 x 7 mm area of acute hemorrhage left cerebellar hemisphere, with bilateral tentorium cerebri hemorrhage noted on MRI.  Neurology signed off, defer to neurosurgery. Wound VAC removed 3/29.  Neurosurgery following  Dexamethasone  PT OT  Up in chair as tolerated  Holding anticoagulation/antiplatelet. Resume per neurosurgery.  Acute encephalopathy: Secondary to meningitis, possibly lower contribution of urinary retention.  Patient required 3 straight caths/2, each draining over 400 cc urine.  Lama catheter  S/p lumbar fusion L3-sacrum: POD 8.  Similar to prior procedures in 1989, 2003 at [facility].  Wound VAC removed 3/29.  Due to CSF leak, lumbar drain by IR on 4/1.  Lumbar drain line kinked on 4/2.  Lumbar CT 4/3 shows some extrathecal gas.  Neurosurgery following.  Monitor   Hypertensive urgency: On no home antihypertensives. /97 on 3/26. As needed labetalol since 3/26, continues to have uncontrolled blood pressure.  Continue amlodipine 5 mg po qd  Labetalol 5 mg IV q4h prn  Restless leg syndrome: Continue home  drain site.  On exam, only minimal drainage noted from this site.  IR to evaluate, made them aware of this.  Neurosurgery ordered repeat CT lumbar spine, showing accumulation of extrathecal gas.  On BMP this morning, patient appears to have low-grade SANTY.  Treating with fluid.  Yesterday, patient CSF grew E. coli, confirmed by culture and PCR.  Treating patient for meningitis with vancomycin, ampicillin, ceftriaxone.  Patient developed metabolic acidosis with high anion gap.  Will obtain lactic acid, Betatrex butyrate.  Patient himself feels somewhat improved from day prior.  Yesterday afternoon, was complaining of exquisite pain.  Now it seems pain is decreased significantly.  Yesterday evening patient was confused, and altered.  This morning she continues with some mild confusion, however has improved since yesterday significantly.  Lab work this morning shows improvement from lab work yesterday afternoon.      Past Medical History: Restless leg syndrome, arthritis  Family History:   High blood pressure, high cholesterol, cancer, diabetes  Social History:   Occasional alcohol use, half pack per day smoker    Scheduled Meds:   DULoxetine  20 mg Oral Nightly    rOPINIRole  1 mg Oral Nightly    cefTRIAXone (ROCEPHIN) IV  2,000 mg IntraVENous Q12H    ampicillin IV  2,000 mg IntraVENous 6 times per day    vancomycin (VANCOCIN) intermittent dosing (placeholder)   Other RX Placeholder    vancomycin  1,000 mg IntraVENous Q12H    amLODIPine  5 mg Oral Daily    [Held by provider] famotidine  20 mg Oral BID    [Held by provider] gabapentin  600 mg Oral QAM    And    [Held by provider] gabapentin  900 mg Oral Nightly    sodium chloride flush  5-40 mL IntraVENous 2 times per day    dexAMETHasone  4 mg IntraVENous Q6H     Continuous Infusions:   sodium chloride         PHYSICAL EXAMINATION:  T: 97.6.  P: 82. RR: 14. B/P: 144/73.  FiO2: 21%. O2 Sat: 97%.  I/O: +121 cc  Body mass index is 16.55 kg/m².     General:

## 2024-04-03 NOTE — PROCEDURES
Bladder scan completed at 9:23pm and results told to RN. Scan showed 0 mL in the patients bladder. Heather

## 2024-04-03 NOTE — H&P
Formulation and discussion of sedation / procedure plans, risks, benefits, side effects and alternatives with patient and/or responsible adult completed.    History and Physical reviewed and unchanged.    Electronically signed by Umesh Fernandez MD on 4/3/24 at 10:54 AM EDT

## 2024-04-03 NOTE — H&P
Department of Radiology  Post Procedure Progress Note      Pre-Procedure Diagnosis:  CSF leak, kinked catheter     Procedure Performed:  replaced CSF drainage cath     Anesthesia: local / versed and fentanyl    Findings: successful, drained 10 ml blood tinged CSF     Immediate Complications:  None    Estimated Blood Loss: minimal    SEE DICTATED PROCEDURE NOTE FOR COMPLETE DETAILS.    Umesh Fernandez MD   4/3/2024 11:12 AM

## 2024-04-03 NOTE — PROGRESS NOTES
Rounded on unit, attempted to speak with patient, patient with live sitter, patient with nonsensical conversation. Will follow clinical course and therapy progress for rehab referral consideration.

## 2024-04-04 LAB
ANION GAP SERPL CALC-SCNC: 12 MEQ/L (ref 8–16)
BASOPHILS ABSOLUTE: 0.1 THOU/MM3 (ref 0–0.1)
BASOPHILS NFR BLD AUTO: 0.3 %
BUN SERPL-MCNC: 29 MG/DL (ref 7–22)
CALCIUM SERPL-MCNC: 8.7 MG/DL (ref 8.5–10.5)
CHLORIDE SERPL-SCNC: 100 MEQ/L (ref 98–111)
CO2 SERPL-SCNC: 22 MEQ/L (ref 23–33)
CREAT SERPL-MCNC: 0.5 MG/DL (ref 0.4–1.2)
DEPRECATED RDW RBC AUTO: 47.8 FL (ref 35–45)
EKG ATRIAL RATE: 74 BPM
EKG ATRIAL RATE: 97 BPM
EKG P AXIS: 86 DEGREES
EKG P-R INTERVAL: 138 MS
EKG P-R INTERVAL: 144 MS
EKG Q-T INTERVAL: 334 MS
EKG Q-T INTERVAL: 404 MS
EKG QRS DURATION: 72 MS
EKG QRS DURATION: 84 MS
EKG QTC CALCULATION (BAZETT): 424 MS
EKG QTC CALCULATION (BAZETT): 448 MS
EKG R AXIS: 131 DEGREES
EKG R AXIS: 62 DEGREES
EKG T AXIS: 108 DEGREES
EKG T AXIS: 59 DEGREES
EKG VENTRICULAR RATE: 74 BPM
EKG VENTRICULAR RATE: 97 BPM
EOSINOPHIL NFR BLD AUTO: 0 %
EOSINOPHILS ABSOLUTE: 0 THOU/MM3 (ref 0–0.4)
ERYTHROCYTE [DISTWIDTH] IN BLOOD BY AUTOMATED COUNT: 13.4 % (ref 11.5–14.5)
GFR SERPL CREATININE-BSD FRML MDRD: > 90 ML/MIN/1.73M2
GLUCOSE SERPL-MCNC: 110 MG/DL (ref 70–108)
HCT VFR BLD AUTO: 34.9 % (ref 37–47)
HGB BLD-MCNC: 12 GM/DL (ref 12–16)
IMM GRANULOCYTES # BLD AUTO: 0.11 THOU/MM3 (ref 0–0.07)
IMM GRANULOCYTES NFR BLD AUTO: 0.6 %
LYMPHOCYTES ABSOLUTE: 1 THOU/MM3 (ref 1–4.8)
LYMPHOCYTES NFR BLD AUTO: 4.8 %
MAGNESIUM SERPL-MCNC: 2.1 MG/DL (ref 1.6–2.4)
MCH RBC QN AUTO: 33.6 PG (ref 26–33)
MCHC RBC AUTO-ENTMCNC: 34.4 GM/DL (ref 32.2–35.5)
MCV RBC AUTO: 97.8 FL (ref 81–99)
MONOCYTES ABSOLUTE: 1.7 THOU/MM3 (ref 0.4–1.3)
MONOCYTES NFR BLD AUTO: 8.8 %
NEUTROPHILS NFR BLD AUTO: 85.5 %
NRBC BLD AUTO-RTO: 0 /100 WBC
PLATELET # BLD AUTO: 204 THOU/MM3 (ref 130–400)
PMV BLD AUTO: 11.8 FL (ref 9.4–12.4)
POTASSIUM SERPL-SCNC: 4.3 MEQ/L (ref 3.5–5.2)
RBC # BLD AUTO: 3.57 MILL/MM3 (ref 4.2–5.4)
SEGMENTED NEUTROPHILS ABSOLUTE COUNT: 16.9 THOU/MM3 (ref 1.8–7.7)
SODIUM SERPL-SCNC: 134 MEQ/L (ref 135–145)
WBC # BLD AUTO: 19.8 THOU/MM3 (ref 4.8–10.8)

## 2024-04-04 PROCEDURE — 87075 CULTR BACTERIA EXCEPT BLOOD: CPT

## 2024-04-04 PROCEDURE — 80048 BASIC METABOLIC PNL TOTAL CA: CPT

## 2024-04-04 PROCEDURE — 87186 SC STD MICRODIL/AGAR DIL: CPT

## 2024-04-04 PROCEDURE — 6370000000 HC RX 637 (ALT 250 FOR IP): Performed by: STUDENT IN AN ORGANIZED HEALTH CARE EDUCATION/TRAINING PROGRAM

## 2024-04-04 PROCEDURE — 97110 THERAPEUTIC EXERCISES: CPT

## 2024-04-04 PROCEDURE — 99232 SBSQ HOSP IP/OBS MODERATE 35: CPT | Performed by: STUDENT IN AN ORGANIZED HEALTH CARE EDUCATION/TRAINING PROGRAM

## 2024-04-04 PROCEDURE — 6370000000 HC RX 637 (ALT 250 FOR IP): Performed by: NURSE PRACTITIONER

## 2024-04-04 PROCEDURE — APPSS30 APP SPLIT SHARED TIME 16-30 MINUTES: Performed by: PHYSICIAN ASSISTANT

## 2024-04-04 PROCEDURE — 6370000000 HC RX 637 (ALT 250 FOR IP): Performed by: PHYSICIAN ASSISTANT

## 2024-04-04 PROCEDURE — 87077 CULTURE AEROBIC IDENTIFY: CPT

## 2024-04-04 PROCEDURE — 6360000002 HC RX W HCPCS

## 2024-04-04 PROCEDURE — 83735 ASSAY OF MAGNESIUM: CPT

## 2024-04-04 PROCEDURE — 2100000000 HC CCU R&B

## 2024-04-04 PROCEDURE — 2580000003 HC RX 258: Performed by: PHYSICIAN ASSISTANT

## 2024-04-04 PROCEDURE — 6370000000 HC RX 637 (ALT 250 FOR IP)

## 2024-04-04 PROCEDURE — 36415 COLL VENOUS BLD VENIPUNCTURE: CPT

## 2024-04-04 PROCEDURE — 6360000002 HC RX W HCPCS: Performed by: PHYSICIAN ASSISTANT

## 2024-04-04 PROCEDURE — 87210 SMEAR WET MOUNT SALINE/INK: CPT

## 2024-04-04 PROCEDURE — 85025 COMPLETE CBC W/AUTO DIFF WBC: CPT

## 2024-04-04 PROCEDURE — 6360000002 HC RX W HCPCS: Performed by: STUDENT IN AN ORGANIZED HEALTH CARE EDUCATION/TRAINING PROGRAM

## 2024-04-04 PROCEDURE — 87205 SMEAR GRAM STAIN: CPT

## 2024-04-04 PROCEDURE — 97535 SELF CARE MNGMENT TRAINING: CPT

## 2024-04-04 PROCEDURE — 2580000003 HC RX 258

## 2024-04-04 PROCEDURE — 99291 CRITICAL CARE FIRST HOUR: CPT | Performed by: INTERNAL MEDICINE

## 2024-04-04 RX ORDER — POLYETHYLENE GLYCOL 3350 17 G/17G
17 POWDER, FOR SOLUTION ORAL DAILY PRN
Status: DISCONTINUED | OUTPATIENT
Start: 2024-04-04 | End: 2024-04-08

## 2024-04-04 RX ADMIN — HYDROCODONE BITARTRATE AND ACETAMINOPHEN 2 TABLET: 5; 325 TABLET ORAL at 04:24

## 2024-04-04 RX ADMIN — HYDROCODONE BITARTRATE AND ACETAMINOPHEN 2 TABLET: 5; 325 TABLET ORAL at 00:22

## 2024-04-04 RX ADMIN — SODIUM CHLORIDE: 9 INJECTION, SOLUTION INTRAVENOUS at 00:23

## 2024-04-04 RX ADMIN — DOCUSATE SODIUM 100 MG: 100 CAPSULE, LIQUID FILLED ORAL at 19:45

## 2024-04-04 RX ADMIN — DOCUSATE SODIUM 100 MG: 100 CAPSULE, LIQUID FILLED ORAL at 09:10

## 2024-04-04 RX ADMIN — FAMOTIDINE 20 MG: 20 TABLET, FILM COATED ORAL at 09:11

## 2024-04-04 RX ADMIN — DEXAMETHASONE SODIUM PHOSPHATE 4 MG: 4 INJECTION, SOLUTION INTRA-ARTICULAR; INTRALESIONAL; INTRAMUSCULAR; INTRAVENOUS; SOFT TISSUE at 23:01

## 2024-04-04 RX ADMIN — ROPINIROLE HYDROCHLORIDE 1 MG: 1 TABLET, FILM COATED ORAL at 19:48

## 2024-04-04 RX ADMIN — DEXAMETHASONE SODIUM PHOSPHATE 4 MG: 4 INJECTION, SOLUTION INTRA-ARTICULAR; INTRALESIONAL; INTRAMUSCULAR; INTRAVENOUS; SOFT TISSUE at 04:24

## 2024-04-04 RX ADMIN — CEFTRIAXONE SODIUM 2000 MG: 2 INJECTION, POWDER, FOR SOLUTION INTRAMUSCULAR; INTRAVENOUS at 16:38

## 2024-04-04 RX ADMIN — SODIUM CHLORIDE, PRESERVATIVE FREE 10 ML: 5 INJECTION INTRAVENOUS at 19:45

## 2024-04-04 RX ADMIN — DULOXETINE HYDROCHLORIDE 20 MG: 20 CAPSULE, DELAYED RELEASE ORAL at 19:48

## 2024-04-04 RX ADMIN — HYDRALAZINE HYDROCHLORIDE 5 MG: 20 INJECTION, SOLUTION INTRAMUSCULAR; INTRAVENOUS at 09:29

## 2024-04-04 RX ADMIN — SENNOSIDES 17.2 MG: 8.6 TABLET ORAL at 19:47

## 2024-04-04 RX ADMIN — SODIUM CHLORIDE: 9 INJECTION, SOLUTION INTRAVENOUS at 16:37

## 2024-04-04 RX ADMIN — POLYETHYLENE GLYCOL 3350 17 G: 17 POWDER, FOR SOLUTION ORAL at 17:20

## 2024-04-04 RX ADMIN — CYCLOBENZAPRINE 10 MG: 10 TABLET, FILM COATED ORAL at 19:45

## 2024-04-04 RX ADMIN — HYDROCODONE BITARTRATE AND ACETAMINOPHEN 2 TABLET: 5; 325 TABLET ORAL at 09:10

## 2024-04-04 RX ADMIN — DEXAMETHASONE SODIUM PHOSPHATE 4 MG: 4 INJECTION, SOLUTION INTRA-ARTICULAR; INTRALESIONAL; INTRAMUSCULAR; INTRAVENOUS; SOFT TISSUE at 16:33

## 2024-04-04 RX ADMIN — CEFTRIAXONE SODIUM 2000 MG: 2 INJECTION, POWDER, FOR SOLUTION INTRAMUSCULAR; INTRAVENOUS at 04:25

## 2024-04-04 RX ADMIN — AMLODIPINE BESYLATE 5 MG: 5 TABLET ORAL at 09:11

## 2024-04-04 RX ADMIN — SODIUM CHLORIDE, PRESERVATIVE FREE 10 ML: 5 INJECTION INTRAVENOUS at 09:11

## 2024-04-04 RX ADMIN — HYDROCODONE BITARTRATE AND ACETAMINOPHEN 2 TABLET: 5; 325 TABLET ORAL at 19:45

## 2024-04-04 RX ADMIN — DEXAMETHASONE SODIUM PHOSPHATE 4 MG: 4 INJECTION, SOLUTION INTRA-ARTICULAR; INTRALESIONAL; INTRAMUSCULAR; INTRAVENOUS; SOFT TISSUE at 10:55

## 2024-04-04 ASSESSMENT — PAIN DESCRIPTION - ORIENTATION
ORIENTATION: LOWER
ORIENTATION: LOWER;MID
ORIENTATION: LOWER
ORIENTATION: LEFT;RIGHT;UPPER;LOWER;MID
ORIENTATION: LOWER

## 2024-04-04 ASSESSMENT — PAIN SCALES - GENERAL
PAINLEVEL_OUTOF10: 6
PAINLEVEL_OUTOF10: 10
PAINLEVEL_OUTOF10: 10
PAINLEVEL_OUTOF10: 2
PAINLEVEL_OUTOF10: 10
PAINLEVEL_OUTOF10: 6
PAINLEVEL_OUTOF10: 5
PAINLEVEL_OUTOF10: 0
PAINLEVEL_OUTOF10: 8

## 2024-04-04 ASSESSMENT — PAIN SCALES - WONG BAKER
WONGBAKER_NUMERICALRESPONSE: NO HURT

## 2024-04-04 ASSESSMENT — PAIN DESCRIPTION - ONSET: ONSET: ON-GOING

## 2024-04-04 ASSESSMENT — PAIN DESCRIPTION - LOCATION
LOCATION: BACK

## 2024-04-04 ASSESSMENT — PAIN - FUNCTIONAL ASSESSMENT: PAIN_FUNCTIONAL_ASSESSMENT: PREVENTS OR INTERFERES SOME ACTIVE ACTIVITIES AND ADLS

## 2024-04-04 ASSESSMENT — PAIN DESCRIPTION - FREQUENCY: FREQUENCY: CONTINUOUS

## 2024-04-04 ASSESSMENT — PAIN DESCRIPTION - DESCRIPTORS
DESCRIPTORS: ACHING;NAGGING
DESCRIPTORS: ACHING

## 2024-04-04 ASSESSMENT — PAIN DESCRIPTION - PAIN TYPE: TYPE: SURGICAL PAIN;ACUTE PAIN

## 2024-04-04 NOTE — PROGRESS NOTES
Physical Medicine & Rehabilitation   Progress Note    Chief Complaint:  lumbar surgery with dura tear. Rehab needs    History of Present Illness:  Alpa Marte is a 68 y.o. female admitted to [facility] on 3/26/2024. Patient  has a past medical history of Arthritis and Restless leg syndrome.  Patient presented to [facility] for planned lumbar intervention. patient had lumbar fusion in 1989 and again in 2003 done at [facility] resulting in pedicle screw and garima instrumentation at lumbar 4 spanning to S1. Patient presented to Dr Caldera for recurring lumbar pain with radicular pain into the left leg. Patient noted left leg weakness and partial left foot drop. CT scan imaged on 2/21/2024 that reflect pedicle screws from L4-S1 along with laminectomies and a grade 3 anterior listhesis of L5 on S1 unchanged. The flexion-extension x-ray also shows the grade 3 to grade 4 anterior listhesis of L5 on S1 with no evidence of additional change in flexion or extension. SI joint imaging revealed narrowing along with sclerosis for SI joints bilaterally.  [facility] lumbar spine indicated \"Previous lumbar spine fusion from L4 through S1.  Grade 2 spondylolisthesis with spondylolysis of L5 on S1.  Disc and osteophytes narrow the neural foramina throughout the lumbar region as discussed in detail above.  No significant stenosis of the thecal sac in the lumbar region. \"  Patient elected to move forward with lumbar revision. Patient underwent Reexploration and Revision of Lumbar Instrumented Fusion for Extension of the Decompression and Instrumentation to L3-Pelvis with Dr Caldera on 3/26/24. Patient with intra-op dura leak that was repaired. Patient was placed on bedrest POD #1. Patient participated with PT 3/28 but with complaints of throbbing neck pain with movement and trouble tolerating.     3/28/24: Patient seen today in consult. Patient was just transferred from  to [facility].  Patient is resting in bed. Patient  drain, therapy, and ongoing therapy needs at discharge. Patient is anxious about returning home. Discussed return to home safely. Will follow therapy notes to assist with discharge planning. Patient understanding she may need a therapy stay prior to returning home, but very antsy about getting home.       2024:  Since last seen by PM&R, lumbar drain was inserted due to CSF leak.  CSF culture positive for E. coli.  Patient with worsening of mental status on 2024 thought to be secondary to E. coli meningitis.  Patient started on ceftriaxone 2 g every 12 hours.  ID was consulted and recommended continuing ceftriaxone for total of 21 days. Patient was seen today resting in bed. She struggles getting comfortable in bed which she attributes to chronic pain. We discussed current functional status and need for assist at time of DC. She reports that her brother works on the road and her son is . No significant support for time of DC. Additionally, rehab admissions coordinator discussed with patient's brother who would prefer SNF for therapy on discharge.      Current Rehabilitation Assessments:  PT 2024:  Bed Mobility:  Rolling to Right: Contact Guard Assistance, with cues for technique    Supine to Sit: Moderate Assistance, with verbal cues , with increased time for completion, pt started process a couple of times but she just kept laying down   Scooting: Contact Guard Assistance     Transfers:  Sit to Stand: Minimal Assistance  Stand to Sit:Minimal Assistance to mod assist- pt trying to sit w/o warning   Cues for hand placement noted poor carryover     Ambulation:  Minimal Assistance, to mod assist   Distance: 5 feet, 15x1  Surface: Level Tile  Device:Rolling Walker  Gait Deviations:  pt needed total assist to guide the walker and to keep to the path of destination, pt demonstrated flexed posture with short step length trying to push walker too far out in front of her - unable to wear any back brace

## 2024-04-04 NOTE — PLAN OF CARE
Problem: Discharge Planning  Goal: Discharge to home or other facility with appropriate resources  Outcome: Progressing     Problem: Pain  Goal: Verbalizes/displays adequate comfort level or baseline comfort level  Outcome: Progressing     Problem: ABCDS Injury Assessment  Goal: Absence of physical injury  Outcome: Progressing     Problem: Safety - Adult  Goal: Free from fall injury  Outcome: Progressing     Problem: Nutrition Deficit:  Goal: Optimize nutritional status  Outcome: Progressing     Problem: Skin/Tissue Integrity  Goal: Absence of new skin breakdown  Description: 1.  Monitor for areas of redness and/or skin breakdown  2.  Assess vascular access sites hourly  3.  Every 4-6 hours minimum:  Change oxygen saturation probe site  4.  Every 4-6 hours:  If on nasal continuous positive airway pressure, respiratory therapy assess nares and determine need for appliance change or resting period.  Outcome: Progressing     Problem: Confusion  Goal: Confusion, delirium, dementia, or psychosis is improved or at baseline  Description: INTERVENTIONS:  1. Assess for possible contributors to thought disturbance, including medications, impaired vision or hearing, underlying metabolic abnormalities, dehydration, psychiatric diagnoses, and notify attending LIP  2. [place] high risk fall precautions, as indicated  3. Provide frequent short contacts to provide reality reorientation, refocusing and direction  4. Decrease environmental stimuli, including noise as appropriate  5. Monitor and intervene to maintain adequate nutrition, hydration, elimination, sleep and activity  6. If unable to ensure safety without constant attention obtain sitter and review sitter guidelines with assigned personnel  7. Initiate Psychosocial CNS and Spiritual Care consult, as indicated  Outcome: Progressing  Flowsheets (Taken 4/3/2024 2015)  Effect of thought disturbance (confusion, delirium, dementia, or psychosis) are managed with adequate  functional status:   Assess for contributors to thought disturbance, including medications, impaired vision or hearing, underlying metabolic abnormalities, dehydration, psychiatric diagnoses, notify LIP   [place] high risk fall precautions, as indicated   Provide frequent short contacts to provide reality reorientation, refocusing and direction   Decrease environmental stimuli, including noise as appropriate   Monitor and intervene to maintain adequate nutrition, hydration, elimination, sleep and activity     Problem: Musculoskeletal - Adult  Goal: Return mobility to safest level of function  Outcome: Progressing  Flowsheets (Taken 4/3/2024 2015)  Return Mobility to Safest Level of Function:   Assess patient stability and activity tolerance for standing, transferring and ambulating with or without assistive devices   Assist with transfers and ambulation using safe patient handling equipment as needed   Ensure adequate protection for wounds/incisions during mobilization   Instruct patient/family in ordered activity level  Goal: Return ADL status to a safe level of function  Outcome: Progressing     Problem: Gastrointestinal - Adult  Goal: Maintains or returns to baseline bowel function  Outcome: Progressing  Flowsheets (Taken 4/3/2024 2015)  Maintains or returns to baseline bowel function:   Assess bowel function   Encourage oral fluids to ensure adequate hydration   Administer IV fluids as ordered to ensure adequate hydration   Administer ordered medications as needed   Encourage mobilization and activity     Problem: Neurosensory - Adult  Goal: Achieves stable or improved neurological status  Outcome: Progressing  Flowsheets (Taken 4/3/2024 2015)  Achieves stable or improved neurological status:   Assess for and report changes in neurological status   Initiate measures to prevent increased intracranial pressure   Maintain blood pressure and fluid volume within ordered parameters to optimize cerebral perfusion and

## 2024-04-04 NOTE — PROGRESS NOTES
CRITICAL CARE PROGRESS NOTE      Patient:   Alpa Marte    Unit/Bed: 3A-03/003-A  YOB: 1955  MRN:  [id number]   PCP:  Connie Augustin PA  Date of Admission:  3/26/2024    Chief Complaint:  Acute hemorrhage    Assessment and Plan:  Meningitis: Improving.  Leukocytosis peak 4/2, decreasing.  CSF molecular PCR 4/2 significant for E. coli K1.  Negative streptococci, negative Listeria. 19,780 leukocyte/cc CSF.  Patient encephalopathic beginning 4/2.  No fever, nuchal rigidity. No photophobia.  Empiric vancomycin, ampicillin discontinued 4/3.   ID following  Repeat CSF culture  Ceftriaxone 2g q12h beginning 4/2, 21 day course.  Patient will need a PICC line  High anion gap metabolic acidosis: Improving.  Bicarb, BUN improving.  Lactic acid WNL, beta-hydroxybutyrate elevated 4/3.  Monitor BMP  Supplement diet  Acute left cerebellar hemorrhage: On 3/31, 12 x 7 mm area of acute hemorrhage left cerebellar hemisphere, with bilateral tentorium cerebri hemorrhage noted on MRI.  Neurology signed off, defer to neurosurgery. Wound VAC removed 3/29.  Neurosurgery following  Dexamethasone  PT OT  Up in chair as tolerated  Holding anticoagulation/antiplatelet. Resume per neurosurgery.  Hyponatremia, acute: improving  Monitor BMP  Acute encephalopathy: Improving. Secondary to meningitis, possibly lower contribution of urinary retention.  Patient required 3 straight caths 4/2, each draining over 400 cc urine.  Lama catheter  S/p lumbar fusion L3-sacrum: POD 9.  Similar to prior procedures in 1989, 2003 at [facility].  Wound VAC removed 3/29.  Due to CSF leak, lumbar drain by IR on 4/1.  Lumbar drain line kinked on 4/2.  Lumbar CT 4/3 shows some extrathecal gas.  Lumbar drain revised 4/3.  Neurosurgery following.  10-15 cc/h drainage target.  Hypertensive urgency: On no home antihypertensives. /97 on 3/26. As needed labetalol since 3/26, continues to have uncontrolled and labile blood

## 2024-04-04 NOTE — PROGRESS NOTES
Tere with Neuro made aware that lumbar drain has not been functioning properly throughout this shift, and output with fluctuations. Informed that pt's drain is continuing to leak around insertion site. Requiring frequent dressing changes. Voiced understanding with no new orders.

## 2024-04-04 NOTE — PROGRESS NOTES
[facility]  [facility] CCU 3A  Occupational Therapy  Reassess/Daily Note  Time:    Time In: 0900  Time Out: 0940  Timed Code Treatment Minutes: 40 Minutes  Minutes: 40          Date: 2024  Patient Name: Alpa Marte,   Gender: female      Room: [facility]003-A  MRN: [id number]  : 1955  (68 y.o.)  Referring Practitioner: Santino Stewart PA-C  Diagnosis: Adjacent segment disease of lumbar spine with history of fusion procedure  Additional Pertinent Hx: Per MD H & P:68 y.o.  female, an every day smoker tobacco and 1/2 pack day history who admits to regular alcohol use and has a medical history significant for restless leg syndrome and urinary incontinence with a surgical history that includes prior lumbar fusions in  and again in  performed at [facility] resulting in pedicle screw and garima instrumentation at lumbar 4 spanning to S1.  She presents today unaccompanied and ambulating without assistance with complaints of chronic worsening low back pain radiating primarily to the left lower extremity all the way to the foot including chronic weakness and chronic partial left foot drop.  She does not take any medication other than over-the-counter medications and treats with ice and heat and rest.  In the past she has been treated by pain specialist who provided some injection therapies (greater than 20 years prior) that provided only transient relief. A CT scan imaged on 2024 that reflect pedicle screws from L4-S1 along with laminectomies and a grade 3 anterior listhesis of L5 on S1 unchanged. The flexion-extension x-ray also shows the grade 3 to grade 4 anterior listhesis of L5 on S1 with no evidence of additional change in flexion or extension. SI joint imaging reveals some narrowing along with sclerosis for SI joints bilaterally.  s/p Reexploration and Revision of Lumbar Instrumented Fusion for Extension of the Decompression and Instrumentation to L3-Pelvis on 3/26.  vcs for safety to increase indep and endurance with ADL routine. NOT MET, REVISE  Short Term Goal 3: Pt will complete LB dressing with LHAE PRN and SBA to increase occupational preformance in home environment. NOT MET, REVISE  Short Term Goal 4: Pt will complete UB dressing with SBA and min vcs for technique to increase indep with don/doff back brace. NOT MET, REVISE  Short Term Goal 5: Pt will verbalize 3 spinal precautions Mod Indep to increase safety with ADL routine. NOT MET, REVISE  Additional Goals?: No    Revised Short-Term Goals  Short Term Goals  Time Frame for Short Term Goals: Until discharge  Short Term Goal 1: Pt will complete grooming task while standing at sink with 0>CGA & min vcs for safety & sequencing  Short Term Goal 2: Pt will tolerate standing 2-3 min with CGA for increased ease of sinkside grooming  Short Term Goal 3: Pt will complete sit-stand t/fs with CGA & min vcs for safety for increased indep with toilet t/fs  Short Term Goal 4: Pt will complete mobility to bedside chair or BSC with RW, CGA, & min vcs for safety  Short Term Goal 5: Pt will complete ADL tasks with min vcs for spinal precautions  Additional Goals?: No  Long Term Goals  Time Frame for Long Term Goals : No LTG set d/t short ELOS      Following session, patient left in safe position with all fall risk precautions in place.

## 2024-04-04 NOTE — PROGRESS NOTES
Pt was asleep but was blessed.    04/04/24 1449   Encounter Summary   Service Provided For: Patient   Referral/Consult From: Demetra   Last Encounter  04/04/24   Complexity of Encounter Low   Begin Time 1020   End Time  1025   Total Time Calculated 5 min   Spiritual/Emotional needs   Type Spiritual Support

## 2024-04-04 NOTE — PLAN OF CARE
Problem: Discharge Planning  Goal: Discharge to home or other facility with appropriate resources  4/4/2024 0952 by Kerry Zapata RN  Flowsheets (Taken 4/1/2024 1712 by Tere Taylor RN)  Discharge to home or other facility with appropriate resources:   Identify barriers to discharge with patient and caregiver   Arrange for needed discharge resources and transportation as appropriate   Identify discharge learning needs (meds, wound care, etc)   Refer to discharge planning if patient needs post-hospital services based on physician order or complex needs related to functional status, cognitive ability or social support system  4/3/2024 2056 by Gaye Ramirez RN  Outcome: Progressing     Problem: Pain  Goal: Verbalizes/displays adequate comfort level or baseline comfort level  4/4/2024 0952 by Kerry Zapata RN  Outcome: Progressing  Flowsheets (Taken 4/1/2024 1712 by Tere Taylor RN)  Verbalizes/displays adequate comfort level or baseline comfort level:   Encourage patient to monitor pain and request assistance   Assess pain using appropriate pain scale   Administer analgesics based on type and severity of pain and evaluate response   Implement non-pharmacological measures as appropriate and evaluate response   Consider cultural and social influences on pain and pain management   Notify Licensed Independent Practitioner if interventions unsuccessful or patient reports new pain  4/3/2024 2056 by Gaye Ramirez RN  Outcome: Progressing     Problem: ABCDS Injury Assessment  Goal: Absence of physical injury  4/4/2024 0952 by Kerry Zapata RN  Outcome: Progressing  Flowsheets (Taken 4/1/2024 1712 by Tere Taylor, RN)  Absence of Physical Injury: Implement safety measures based on patient assessment  4/3/2024 2056 by Gaye Ramirez RN  Outcome: Progressing     Problem: Safety - Adult  Goal: Free from fall injury  4/4/2024 0952 by Kerry Zapata RN  Outcome: Progressing  Flowsheets (Taken 4/1/2024  IV fluids as ordered to ensure adequate hydration   Administer ordered medications as needed   Encourage mobilization and activity     Problem: Neurosensory - Adult  Goal: Achieves stable or improved neurological status  4/4/2024 0952 by Kerry Zapata RN  Outcome: Progressing  Flowsheets (Taken 4/3/2024 2015 by Gaye Ramirez RN)  Achieves stable or improved neurological status:   Assess for and report changes in neurological status   Initiate measures to prevent increased intracranial pressure   Maintain blood pressure and fluid volume within ordered parameters to optimize cerebral perfusion and minimize risk of hemorrhage   Monitor temperature, glucose, and sodium. Initiate appropriate interventions as ordered  4/3/2024 2056 by Gaye Ramirez RN  Outcome: Progressing  Flowsheets (Taken 4/3/2024 2015)  Achieves stable or improved neurological status:   Assess for and report changes in neurological status   Initiate measures to prevent increased intracranial pressure   Maintain blood pressure and fluid volume within ordered parameters to optimize cerebral perfusion and minimize risk of hemorrhage   Monitor temperature, glucose, and sodium. Initiate appropriate interventions as ordered  Goal: Achieves maximal functionality and self care  4/4/2024 0952 by Kerry Zapata RN  Outcome: Progressing  Flowsheets (Taken 4/3/2024 2015 by Gaye Ramirez RN)  Achieves maximal functionality and self care:   Monitor swallowing and airway patency with patient fatigue and changes in neurological status   Encourage and assist patient to increase activity and self care with guidance from physical therapy/occupational therapy  4/3/2024 2056 by Gaye Ramirez RN  Outcome: Progressing  Flowsheets (Taken 4/3/2024 2015)  Achieves maximal functionality and self care:   Monitor swallowing and airway patency with patient fatigue and changes in neurological status   Encourage and assist patient to increase activity and self care

## 2024-04-04 NOTE — CARE COORDINATION
4/4/24, 2:38 PM EDT    DISCHARGE ON GOING EVALUATION    [facility] day: 9  Location: -05/005-A Reason for admit: Adjacent segment disease of lumbar spine with history of fusion procedure [M51.36, Z98.1]  S/P lumbar fusion [Z98.1]  Meningitis [G03.9]   Procedure:   3/26 Reexploration and Revision of Lumbar Instrumented Fusion for Extension of the Decompression and Instrumentation to L3-Pelvis    3/29 lumbar drain removed  3/31 CTA head and neck:   1. There is plaque involving the left carotid bifurcation. There is no significant hemodynamic stenosis in the left common and internal carotid arteries.  2. There is no significant hemodynamic stenosis in the right common and internal carotid arteries.  3. There is antegrade flow in the right and left vertebral arteries.  4. There is plaque in the proximal left subclavian artery. There is atherosclerotic calcification in the aortic arch.  5. There is atherosclerotic calcification in the cavernous segments of both internal carotid arteries. There is no evidence of severe stenosis.  6. The right and left superior cerebellar arteries are not clearly seen.  7. Otherwise negative CTA of the head and neck.  3/31 MRI brain:   1. 12 x 7 mm area of acute hemorrhage in the left cerebellar hemisphere.  2. There is hemorrhage along the tentorium cerebelli bilaterally.  3. There is a small area of susceptibility artifact in the subdural space over the right frontal lobe.   4. There is mild atrophy and probable ischemic changes in the white matter. There is no evidence for an acute infarct.  5. There are inflammatory changes in ethmoid air cells and mastoid air cells bilaterally and in the left maxillary sinus.  4/1 Lumbar CSF drain placed  4/3 CSF Lumbar drain replaced in IR  Barriers to Discharge: Intensivist, ID, n/s following. Plan IV rocephin q 12hrs for total of 21 days (last dose scheduled for 4/23). Repeat CSF culture pending. IV decadron. Required PRN IV  apresoline today. Lumbar drain care.   PCP: Connie Augustin PA  Readmission Risk Score: 12.2%  Patient Goals/Plan/Treatment Preferences: Received call from QUINN Crockett, she spoke with patient's family and they wish for her to go to a facility in [facility] at discharge. SW consult placed.

## 2024-04-04 NOTE — PROGRESS NOTES
Pharmacy Renal Adjustment    Pharmacy renally adjusted the following medication(s) per P&T approved policy: Pepcid    Recent Labs     04/02/24  0600 04/03/24  0621   BUN 22 39*   CREATININE 0.5 0.9     Estimated Creatinine Clearance: 43 mL/min (based on SCr of 0.9 mg/dL).    Assessment:  Normal    Plan:   Decrease Pepcid from 20mg bid to 20mg daily    Please call pharmacy with any questions.    Adry Robison [facility]  4/3/2024 8:18 PM

## 2024-04-04 NOTE — PROGRESS NOTES
Neurosurgery Progress Note    Patient:  Alpa Marte      Unit/Bed:3A-03/003-A    YOB: 1955    MRN: [id number]     Acct: [id number]     Admit date: 3/26/2024    No chief complaint on file.      Patient Seen, Chart, Physician notes, Labs, Radiology studies reviewed.    Subjective: Patient is seen and evaluated on 3A with evaluation and exam findings reviewed and discussed with Dr. Caldera/neurosurgeon and with nursing.  Patient is resting comfortably on her right side with pain moderately controlled.  She was more alert and interactive on exam this morning.        Past, Family, Social History unchanged from admission.    Diet:  ADULT ORAL NUTRITION SUPPLEMENT; Breakfast, Lunch, Dinner; Other Oral Supplement; Activia and hot beverage  ADULT DIET; Regular  ADULT ORAL NUTRITION SUPPLEMENT; Breakfast, Lunch, Dinner; Standard High Calorie/High Protein Oral Supplement    Medications:  Scheduled Meds:   cefTRIAXone (ROCEPHIN) IV  2,000 mg IntraVENous Q12H    DULoxetine  20 mg Oral Nightly    rOPINIRole  1 mg Oral Nightly    lidocaine  3 patch TransDERmal Daily    senna  2 tablet Oral Nightly    docusate sodium  100 mg Oral BID    famotidine  20 mg Oral Daily    amLODIPine  5 mg Oral Daily    [Held by provider] gabapentin  600 mg Oral QAM    And    [Held by provider] gabapentin  900 mg Oral Nightly    sodium chloride flush  5-40 mL IntraVENous 2 times per day    dexAMETHasone  4 mg IntraVENous Q6H     Continuous Infusions:   sodium chloride 75 mL/hr at 04/04/24 0456    sodium chloride       PRN Meds:acetaminophen, naloxone, HYDROcodone 5 mg - acetaminophen **OR** HYDROcodone 5 mg - acetaminophen, hydrALAZINE, ondansetron, labetalol, sodium chloride flush, sodium chloride, cyclobenzaprine    Objective: Patient is observed lying in bed on her right side and was more comfortable with pain moderately controlled.  She was slightly more alert and interactive on exam today.  Recently changed dressings were intact  TECHNIQUE: AP and lateral views of the lumbar spine were obtained intraoperatively. FINDINGS: There are bilateral pedicle screws in L3, L4, L5, S1 and traversing both sacroiliac joints. The hardware appears intact.      Intraoperative images for level localization. **This report has been created using voice recognition software. It may contain minor errors which are inherent in voice recognition technology.** Final report electronically signed by Dr. Luz Marina Robison on 3/27/2024 11:52 AM    FLUORO FOR SURGICAL PROCEDURES    Result Date: 3/26/2024  Radiology exam is complete. No Radiologist dictation. Please follow up with ordering provider.                    Assessment: Postoperative day 9 from reexploration and revision of lumbar fusion for extension of the fusion    Principal Problem:    S/P lumbar fusion  Active Problems:    Transient alteration of awareness    Intracranial bleeding (HCC)    Cerebellar hemorrhage (HCC)    Severe malnutrition (HCC)    Meningitis    Post-op pain  Resolved Problems:    * No resolved hospital problems. *        Plan: Patient is seen and evaluated on 3A with evaluation and exam findings reviewed and discussed with Dr. Caldera/neurosurgeon and the nursing.  Patient is resting comfortably postoperative day 9 from reexploration and revision of lumbar fusion for extension of the fusion as performed by Dr. Caldera/neurosurgeon.  Recently change dressings were intact over a flat incision with nursing noting some saturation at the insertion site of the lumbar drain.  Of note: The lumbar drain was revised yesterday by IR and is functioning with a setting recommendation at 15 cc/h noted.  Nursing is instructed to change the dressings as needed.  Infectious disease is following with treatment for CSF inclusion with E. coli.  Neurosurgery to follow      Electronically signed by Santino Stewart PA-C on 4/4/2024 at 7:09 AM    Neurosurgery

## 2024-04-04 NOTE — PROGRESS NOTES
[facility]  PHYSICAL THERAPY MISSED TREATMENT NOTE  STRZ CCU 3A    Date: 2024  Patient Name: Alpa Marte        MRN: [id number]   : 1955  (68 y.o.)  Gender: female   Referring Practitioner: Santino Stewart PA-C  Diagnosis: Adjacent segment disease of lumbar spine with history of fusion procedure         REASON FOR MISSED TREATMENT:  Pt had gotten up w/ OT and pt was restless and demonstrating pain behaviors with OT and currently back in bed, will check back next available date .

## 2024-04-04 NOTE — PLAN OF CARE
Problem: Discharge Planning  Goal: Discharge to home or other facility with appropriate resources  4/4/2024 1957 by Jacob Horne RN  Outcome: Progressing  4/4/2024 0952 by Kerry Zapata RN  Flowsheets (Taken 4/1/2024 1712 by Tere Taylor RN)  Discharge to home or other facility with appropriate resources:   Identify barriers to discharge with patient and caregiver   Arrange for needed discharge resources and transportation as appropriate   Identify discharge learning needs (meds, wound care, etc)   Refer to discharge planning if patient needs post-hospital services based on physician order or complex needs related to functional status, cognitive ability or social support system     Problem: Pain  Goal: Verbalizes/displays adequate comfort level or baseline comfort level  4/4/2024 1957 by Jacob Horne RN  Outcome: Progressing  4/4/2024 0952 by Kerry Zapata RN  Outcome: Progressing  Flowsheets (Taken 4/1/2024 1712 by eTre Taylor RN)  Verbalizes/displays adequate comfort level or baseline comfort level:   Encourage patient to monitor pain and request assistance   Assess pain using appropriate pain scale   Administer analgesics based on type and severity of pain and evaluate response   Implement non-pharmacological measures as appropriate and evaluate response   Consider cultural and social influences on pain and pain management   Notify Licensed Independent Practitioner if interventions unsuccessful or patient reports new pain     Problem: ABCDS Injury Assessment  Goal: Absence of physical injury  4/4/2024 1957 by Jacob Horne RN  Outcome: Progressing  4/4/2024 0952 by Kerry Zapata RN  Outcome: Progressing  Flowsheets (Taken 4/1/2024 1712 by Tere Taylor RN)  Absence of Physical Injury: Implement safety measures based on patient assessment     Problem: Safety - Adult  Goal: Free from fall injury  4/4/2024 1957 by Jacob Horne RN  Outcome: Progressing  4/4/2024 0952 by Kerry Zapata

## 2024-04-04 NOTE — PROGRESS NOTES
Will continue to follow clinical course.     Update:  1:30 call made to patient brother Ja who states that the patient lives alone and she has friends that were planning to assist with patient at discharge.  However, at this point in time Ja feels that [facility] setting for subacute rehab would be most appropriate for Alpa.      SHERYL Sutherland updated via telephone voicemail.

## 2024-04-04 NOTE — PROGRESS NOTES
fluoroscopic guidance. The catheter was unlooped over the right lower lumbar and gluteal region in a fashion to hopefully avoid future kinking. A fluoroscopic spot film was obtained to document appropriate catheter position.. I was able to aspirate 10 mL of slightly blood-tinged CSF through the indwelling catheter at time of this procedure.     Status post successful lumbar CSF drainage catheter exchange through same access.. **This report has been created using voice recognition software.  It may contain minor errors which are inherent in voice recognition technology.** Final report electronically signed by Dr. Umesh Fernandez on 4/3/2024 2:43 PM    CT LUMBAR SPINE WO CONTRAST    Result Date: 4/3/2024  PROCEDURE: CT LUMBAR SPINE WO CONTRAST CLINICAL INFORMATION: post fusion revision with dural tear and repair. Status post lumbar drain placement yesterday. COMPARISON: Fluoroscopic images 4/1/2024. CT lumbar spine 2/21/2024. TECHNIQUE: 3 mm noncontrast axial images were obtained through the lumbar spine. Sagittal and coronal reconstructions were obtained. All CT scans at this facility use dose modulation, iterative reconstruction, and/or weight-based dosing when appropriate to reduce radiation dose to as low as reasonably achievable. FINDINGS: There is a drain entering on the right between the lamina of L3 on L4. This enters the spinal canal. Just superior to its entrance site, there is some extrathecal gas in the laminectomy bed. This gas is presumably placed during the procedure. The catheter then extends into the left lateral spinal canal. The distal tip is not included in the field-of-view. This extends at least to T11. There is a radiopaque marker at the L1 level. This position is stable compared to the patient's immediate post procedure images. Based on its positioning in the spinal canal is difficult to confirm if this is intrathecal or epidural. There are bilateral pedicle screws in L3, L4, L5, S1 and  traversing both sacroiliac joints. The hardware appears appropriately positioned. There is no lucency surrounding the hardware. There is some inferior posterior particulate fusion material. There is stable 1.9 cm of anterolisthesis of L5 on S1. The other lumbar vertebral bodies are normally aligned.  There are no compression fractures.  There does appear to be a solid osseous fusion across the posterior elements of L4-S1. There is no a solid fusion across the posterior elements at the L3-4 level.  No suspicious osseous lesions are present. The contents of the thecal sac cannot be seen. On the axial images, there is a small amount of gas density in the posterior soft tissues presumably due to the patient's recent procedure. Some of the gas density is more superior than expected. This includes some gas density adjacent to the spinous process of L1. There are no suspicious findings in the visualized aspects of the retroperitoneum and paraspinal soft tissues.     1. Catheter within the spinal canal entering at the L3-4 level and traversing along the left lateral aspect of the spinal canal. 2. Gas density in the posterior soft tissues. The largest collection is just superior to the catheter entry site. Gas is presumably related to the patient's recent procedure. **This report has been created using voice recognition software. It may contain minor errors which are inherent in voice recognition technology.** Final report electronically signed by Dr. Luz Marina Robison on 4/3/2024 7:26 AM         Problem list of patient:     Patient Active Problem List   Diagnosis Code    S/P lumbar fusion Z98.1    Moderate malnutrition (HCC) E44.0    Transient alteration of awareness R40.4    Intracranial bleeding (HCC) I62.9    Cerebellar hemorrhage (HCC) I61.4    Severe malnutrition (HCC) E43    Meningitis G03.9    Post-op pain G89.18         ASSESSMENT/PLAN   E. coli bacteremia with meningitis: Continue Rocephin every 12 hours for 21 days in

## 2024-04-05 LAB
ANION GAP SERPL CALC-SCNC: 14 MEQ/L (ref 8–16)
BUN SERPL-MCNC: 26 MG/DL (ref 7–22)
CALCIUM SERPL-MCNC: 8.9 MG/DL (ref 8.5–10.5)
CHLORIDE SERPL-SCNC: 97 MEQ/L (ref 98–111)
CO2 SERPL-SCNC: 23 MEQ/L (ref 23–33)
CREAT SERPL-MCNC: 0.5 MG/DL (ref 0.4–1.2)
DEPRECATED RDW RBC AUTO: 47.8 FL (ref 35–45)
ERYTHROCYTE [DISTWIDTH] IN BLOOD BY AUTOMATED COUNT: 13.5 % (ref 11.5–14.5)
GFR SERPL CREATININE-BSD FRML MDRD: > 90 ML/MIN/1.73M2
GLUCOSE SERPL-MCNC: 109 MG/DL (ref 70–108)
HCT VFR BLD AUTO: 36.8 % (ref 37–47)
HGB BLD-MCNC: 13 GM/DL (ref 12–16)
MAGNESIUM SERPL-MCNC: 2 MG/DL (ref 1.6–2.4)
MCH RBC QN AUTO: 34.1 PG (ref 26–33)
MCHC RBC AUTO-ENTMCNC: 35.3 GM/DL (ref 32.2–35.5)
MCV RBC AUTO: 96.6 FL (ref 81–99)
PLATELET # BLD AUTO: 270 THOU/MM3 (ref 130–400)
PMV BLD AUTO: 11.3 FL (ref 9.4–12.4)
POTASSIUM SERPL-SCNC: 4.3 MEQ/L (ref 3.5–5.2)
RBC # BLD AUTO: 3.81 MILL/MM3 (ref 4.2–5.4)
SODIUM SERPL-SCNC: 134 MEQ/L (ref 135–145)
WBC # BLD AUTO: 13.1 THOU/MM3 (ref 4.8–10.8)

## 2024-04-05 PROCEDURE — 83735 ASSAY OF MAGNESIUM: CPT

## 2024-04-05 PROCEDURE — APPSS30 APP SPLIT SHARED TIME 16-30 MINUTES: Performed by: PHYSICIAN ASSISTANT

## 2024-04-05 PROCEDURE — 6360000002 HC RX W HCPCS: Performed by: PHYSICIAN ASSISTANT

## 2024-04-05 PROCEDURE — 80048 BASIC METABOLIC PNL TOTAL CA: CPT

## 2024-04-05 PROCEDURE — 6370000000 HC RX 637 (ALT 250 FOR IP): Performed by: STUDENT IN AN ORGANIZED HEALTH CARE EDUCATION/TRAINING PROGRAM

## 2024-04-05 PROCEDURE — 97110 THERAPEUTIC EXERCISES: CPT

## 2024-04-05 PROCEDURE — 2500000003 HC RX 250 WO HCPCS

## 2024-04-05 PROCEDURE — 6370000000 HC RX 637 (ALT 250 FOR IP): Performed by: NURSE PRACTITIONER

## 2024-04-05 PROCEDURE — 6360000002 HC RX W HCPCS: Performed by: INTERNAL MEDICINE

## 2024-04-05 PROCEDURE — 97535 SELF CARE MNGMENT TRAINING: CPT

## 2024-04-05 PROCEDURE — 6360000002 HC RX W HCPCS

## 2024-04-05 PROCEDURE — 2580000003 HC RX 258: Performed by: INTERNAL MEDICINE

## 2024-04-05 PROCEDURE — 99291 CRITICAL CARE FIRST HOUR: CPT | Performed by: INTERNAL MEDICINE

## 2024-04-05 PROCEDURE — 2580000003 HC RX 258

## 2024-04-05 PROCEDURE — 6370000000 HC RX 637 (ALT 250 FOR IP): Performed by: PHYSICIAN ASSISTANT

## 2024-04-05 PROCEDURE — 2580000003 HC RX 258: Performed by: PHYSICIAN ASSISTANT

## 2024-04-05 PROCEDURE — 2100000000 HC CCU R&B

## 2024-04-05 PROCEDURE — 87040 BLOOD CULTURE FOR BACTERIA: CPT

## 2024-04-05 PROCEDURE — 85027 COMPLETE CBC AUTOMATED: CPT

## 2024-04-05 PROCEDURE — 36415 COLL VENOUS BLD VENIPUNCTURE: CPT

## 2024-04-05 RX ORDER — DEXMEDETOMIDINE HYDROCHLORIDE 4 UG/ML
.1-1.5 INJECTION, SOLUTION INTRAVENOUS CONTINUOUS
Status: DISCONTINUED | OUTPATIENT
Start: 2024-04-05 | End: 2024-04-09

## 2024-04-05 RX ORDER — HALOPERIDOL 5 MG/ML
1 INJECTION INTRAMUSCULAR ONCE
Status: COMPLETED | OUTPATIENT
Start: 2024-04-06 | End: 2024-04-05

## 2024-04-05 RX ORDER — FENTANYL CITRATE 50 UG/ML
25 INJECTION, SOLUTION INTRAMUSCULAR; INTRAVENOUS ONCE
Status: COMPLETED | OUTPATIENT
Start: 2024-04-05 | End: 2024-04-05

## 2024-04-05 RX ADMIN — ROPINIROLE HYDROCHLORIDE 1 MG: 1 TABLET, FILM COATED ORAL at 20:17

## 2024-04-05 RX ADMIN — SODIUM CHLORIDE, PRESERVATIVE FREE 10 ML: 5 INJECTION INTRAVENOUS at 20:19

## 2024-04-05 RX ADMIN — HALOPERIDOL LACTATE 1 MG: 5 INJECTION, SOLUTION INTRAMUSCULAR at 23:46

## 2024-04-05 RX ADMIN — HYDROCODONE BITARTRATE AND ACETAMINOPHEN 1 TABLET: 5; 325 TABLET ORAL at 08:46

## 2024-04-05 RX ADMIN — DOCUSATE SODIUM 100 MG: 100 CAPSULE, LIQUID FILLED ORAL at 20:19

## 2024-04-05 RX ADMIN — CEFTRIAXONE SODIUM 2000 MG: 2 INJECTION, POWDER, FOR SOLUTION INTRAMUSCULAR; INTRAVENOUS at 04:11

## 2024-04-05 RX ADMIN — FENTANYL CITRATE 25 MCG: 50 INJECTION INTRAMUSCULAR; INTRAVENOUS at 22:31

## 2024-04-05 RX ADMIN — AMLODIPINE BESYLATE 5 MG: 5 TABLET ORAL at 08:37

## 2024-04-05 RX ADMIN — CYCLOBENZAPRINE 10 MG: 10 TABLET, FILM COATED ORAL at 22:06

## 2024-04-05 RX ADMIN — HYDROCODONE BITARTRATE AND ACETAMINOPHEN 2 TABLET: 5; 325 TABLET ORAL at 20:18

## 2024-04-05 RX ADMIN — DEXAMETHASONE SODIUM PHOSPHATE 4 MG: 4 INJECTION, SOLUTION INTRA-ARTICULAR; INTRALESIONAL; INTRAMUSCULAR; INTRAVENOUS; SOFT TISSUE at 04:03

## 2024-04-05 RX ADMIN — HYDROCODONE BITARTRATE AND ACETAMINOPHEN 2 TABLET: 5; 325 TABLET ORAL at 04:14

## 2024-04-05 RX ADMIN — HYDROCODONE BITARTRATE AND ACETAMINOPHEN 1 TABLET: 5; 325 TABLET ORAL at 15:00

## 2024-04-05 RX ADMIN — SODIUM CHLORIDE, PRESERVATIVE FREE 10 ML: 5 INJECTION INTRAVENOUS at 08:31

## 2024-04-05 RX ADMIN — SENNOSIDES 17.2 MG: 8.6 TABLET ORAL at 20:18

## 2024-04-05 RX ADMIN — FAMOTIDINE 20 MG: 20 TABLET, FILM COATED ORAL at 08:37

## 2024-04-05 RX ADMIN — DULOXETINE HYDROCHLORIDE 20 MG: 20 CAPSULE, DELAYED RELEASE ORAL at 20:18

## 2024-04-05 RX ADMIN — Medication 0.2 MCG/KG/HR: at 22:36

## 2024-04-05 RX ADMIN — ACETAMINOPHEN 650 MG: 325 TABLET ORAL at 22:08

## 2024-04-05 RX ADMIN — CEFTRIAXONE SODIUM 2000 MG: 2 INJECTION, POWDER, FOR SOLUTION INTRAMUSCULAR; INTRAVENOUS at 16:32

## 2024-04-05 RX ADMIN — DOCUSATE SODIUM 100 MG: 100 CAPSULE, LIQUID FILLED ORAL at 08:38

## 2024-04-05 RX ADMIN — GENTAMICIN: 10 INJECTION, SOLUTION INTRAMUSCULAR; INTRAVENOUS at 14:52

## 2024-04-05 ASSESSMENT — PAIN SCALES - GENERAL
PAINLEVEL_OUTOF10: 7
PAINLEVEL_OUTOF10: 6
PAINLEVEL_OUTOF10: 10
PAINLEVEL_OUTOF10: 8

## 2024-04-05 ASSESSMENT — PAIN DESCRIPTION - FREQUENCY: FREQUENCY: CONTINUOUS

## 2024-04-05 ASSESSMENT — PAIN DESCRIPTION - LOCATION
LOCATION: BACK

## 2024-04-05 ASSESSMENT — PAIN DESCRIPTION - DESCRIPTORS
DESCRIPTORS: DISCOMFORT;STABBING
DESCRIPTORS: ACHING
DESCRIPTORS: STABBING
DESCRIPTORS: STABBING

## 2024-04-05 ASSESSMENT — PAIN DESCRIPTION - ORIENTATION
ORIENTATION: LEFT;MID;RIGHT
ORIENTATION: MID;LOWER
ORIENTATION: MID;LOWER

## 2024-04-05 ASSESSMENT — PAIN DESCRIPTION - PAIN TYPE: TYPE: SURGICAL PAIN;ACUTE PAIN

## 2024-04-05 ASSESSMENT — PAIN DESCRIPTION - ONSET: ONSET: ON-GOING

## 2024-04-05 NOTE — PROGRESS NOTES
I spoke with Dr Caldera about the persistent drainage.he will take her to surgery to try close the dura. He suggested intrathecal gentamicin for 3 days to help clean the CSF. Will ask pharmacy to use gentamicin for three days

## 2024-04-05 NOTE — PROGRESS NOTES
[facility]  STRZ CVICU 4B  Occupational Therapy  Daily Note  Time:    Time In: 1041  Time Out: 1104  Timed Code Treatment Minutes: 23 Minutes  Minutes: 23          Date: 2024  Patient Name: Alpa Marte,   Gender: female      Room: [facility]05/005-A  MRN: [id number]  : 1955  (68 y.o.)  Referring Practitioner: Santino Stewart PA-C  Diagnosis: Adjacent segment disease of lumbar spine with history of fusion procedure  Additional Pertinent Hx: Per MD H & P:68 y.o.  female, an every day smoker tobacco and 1/2 pack day history who admits to regular alcohol use and has a medical history significant for restless leg syndrome and urinary incontinence with a surgical history that includes prior lumbar fusions in  and again in  performed at [facility] resulting in pedicle screw and garima instrumentation at lumbar 4 spanning to S1.  She presents today unaccompanied and ambulating without assistance with complaints of chronic worsening low back pain radiating primarily to the left lower extremity all the way to the foot including chronic weakness and chronic partial left foot drop.  She does not take any medication other than over-the-counter medications and treats with ice and heat and rest.  In the past she has been treated by pain specialist who provided some injection therapies (greater than 20 years prior) that provided only transient relief. A CT scan imaged on 2024 that reflect pedicle screws from L4-S1 along with laminectomies and a grade 3 anterior listhesis of L5 on S1 unchanged. The flexion-extension x-ray also shows the grade 3 to grade 4 anterior listhesis of L5 on S1 with no evidence of additional change in flexion or extension. SI joint imaging reveals some narrowing along with sclerosis for SI joints bilaterally.  s/p Reexploration and Revision of Lumbar Instrumented Fusion for Extension of the Decompression and Instrumentation to L3-Pelvis on 3/26. CT 3/30  confusion    ADL:   Grooming: Contact Guard Assistance.  While washing face while seated EOB, mod A for brushing hair  Footwear Management: Dependent.  For donning slipper socks .    IADL:   Not Tested    BALANCE:  Sitting Balance:  Contact Guard Assistance-min A. Very impulsive, vcs for safety with Pt repositioning self constantly d/t pain. Pt tolerated sitting EOB only 3 min    BED MOBILITY:  Supine to Sit: Moderate Assistance, X 1    Sit to Supine: Maximum Assistance, X 1 for safe technique  Rolling side to side with mod A x 1 & max vcs for technique d/t drainage on bed linens from back dressing-noted to be saturated (RN notified)    ADDITIONAL ACTIVITIES:  Dietary aide in room at beginning of session, therapist educated aide on only giving Pt 2 options or Yes/no answers. Pt observed having very difficult time making decisions even with this task simplified.      AM-PAC Inpatient Daily Activity Raw Score: 11  AM-PAC Inpatient ADL T-Scale Score : 29.04  ADL Inpatient CMS 0-100% Score: 70.42    Modified Judy Scale:  +4 - Moderately severe disability; unable to walk and attend to bodily needs without assistance.   Patient could not live alone and could not walk from one room to another without physical help from another person, but they can sit up in bed without any help.  Education provided regarding stroke rehabilitation management.    ASSESSMENT:     Activity Tolerance:  Patient tolerance of  treatment: Fair treatment tolerance       Discharge Recommendations: Continue to assess pending progress  Equipment Recommendations: Other: Monitor need for LHAE  Plan: Times Per Week: 5x; Change POC d/t Pt not tolerating therapy well d/t pain with OOB  Times Per Day: Once a day  Current Treatment Recommendations: Balance training, ROM, Functional mobility training, Endurance training, Safety education & training, Patient/Caregiver education & training, Equipment evaluation, education, & procurement, Self-Care / ADL, Home  management training    Education:  Learners: Patient  See above    Goals  Short Term Goals  Time Frame for Short Term Goals: Until discharge  Short Term Goal 1: Pt will complete grooming task while standing at sink with 0>CGA & min vcs for safety & sequencing  Short Term Goal 2: Pt will tolerate standing 2-3 min with CGA for increased ease of sinkside grooming  Short Term Goal 3: Pt will complete sit-stand t/fs with CGA & min vcs for safety for increased indep with toilet t/fs  Short Term Goal 4: Pt will complete mobility to bedside chair or BSC with RW, CGA, & min vcs for safety  Short Term Goal 5: Pt will complete self feeding with HOB elevated & min vcs for initiation  Additional Goals?: No  Long Term Goals  Time Frame for Long Term Goals : No LTG set d/t short ELOS    Following session, patient left in safe position with all fall risk precautions in place.

## 2024-04-05 NOTE — PROGRESS NOTES
Gentamicin was administered via the lumbar drain with out difficulty. Due to the ongoing leak will clamp it for 2 hrs.

## 2024-04-05 NOTE — PROGRESS NOTES
Neurosurgery Progress Note    Patient:  Alpa Marte      Unit/Bed:4B-05/005-A    YOB: 1955    MRN: [id number]     Acct: [id number]     Admit date: 3/26/2024    No chief complaint on file.      Patient Seen, Chart, Physician notes, Labs, Radiology studies reviewed.    Subjective: The patient is seen and evaluated on 4B with evaluation and exam findings reviewed and discussed with Dr. Caldera/neurosurgeon and the nursing.  Patient was lying in bed comfortably with pain well-controlled this morning.        Past, Family, Social History unchanged from admission.    Diet:  ADULT ORAL NUTRITION SUPPLEMENT; Breakfast, Lunch, Dinner; Other Oral Supplement; Activia and hot beverage  ADULT DIET; Regular  ADULT ORAL NUTRITION SUPPLEMENT; Breakfast, Lunch, Dinner; Standard High Calorie/High Protein Oral Supplement    Medications:  Scheduled Meds:   cefTRIAXone (ROCEPHIN) IV  2,000 mg IntraVENous Q12H    DULoxetine  20 mg Oral Nightly    rOPINIRole  1 mg Oral Nightly    lidocaine  3 patch TransDERmal Daily    senna  2 tablet Oral Nightly    docusate sodium  100 mg Oral BID    famotidine  20 mg Oral Daily    amLODIPine  5 mg Oral Daily    [Held by provider] gabapentin  600 mg Oral QAM    And    [Held by provider] gabapentin  900 mg Oral Nightly    sodium chloride flush  5-40 mL IntraVENous 2 times per day    dexAMETHasone  4 mg IntraVENous Q6H     Continuous Infusions:   sodium chloride Stopped (04/04/24 1817)     PRN Meds:polyethylene glycol, acetaminophen, naloxone, HYDROcodone 5 mg - acetaminophen **OR** HYDROcodone 5 mg - acetaminophen, hydrALAZINE, ondansetron, labetalol, sodium chloride flush, sodium chloride, cyclobenzaprine    Objective: The patient is observed lying in bed comfortably with pain well-controlled this morning.  Dressings are intact over flat dry incision and remained so through the night with the lumbar drain intact and functioning at 15 cc/h as indicated.    Vitals: BP (!) 155/99   Pulse  traversing both sacroiliac joints. The hardware appears intact.      Intraoperative images for level localization. **This report has been created using voice recognition software. It may contain minor errors which are inherent in voice recognition technology.** Final report electronically signed by Dr. Luz Marina Robison on 3/27/2024 11:52 AM    FLUORO FOR SURGICAL PROCEDURES    Result Date: 3/26/2024  Radiology exam is complete. No Radiologist dictation. Please follow up with ordering provider.                    Assessment: Postoperative day 10 from reexploration and revision of lumbar fusion with decompression and extension of the fusion plus placement of lumbar drain to facilitate wound closure    Principal Problem:    S/P lumbar fusion  Active Problems:    Transient alteration of awareness    Intracranial bleeding (HCC)    Cerebellar hemorrhage (HCC)    Severe malnutrition (HCC)    Meningitis    Post-op pain  Resolved Problems:    * No resolved hospital problems. *        Plan: Patient is seen and evaluated on 4B with evaluation and exam findings reviewed and discussed with Dr. Caldera/neurosurgeon and with nursing.  Patient is resting comfortably with pain well to moderately controlled postoperative day 10 from reexploration and revision of her lumbar fusion for extension of the fusion with recent placement for lumbar drain to facilitate wound closure.  Dressings remain intact over flat dry incision and remain dry throughout the night with a lumbar drain intact and functioning at 15 cc/h as indicated.  Infectious disease is treating to infection.  Neurosurgery to follow      Electronically signed by Santino Stewart PA-C on 4/5/2024 at 6:58 AM    Neurosurgery

## 2024-04-05 NOTE — PLAN OF CARE
Problem: Pain  Goal: Verbalizes/displays adequate comfort level or baseline comfort level  Outcome: Not Progressing     Problem: Discharge Planning  Goal: Discharge to home or other facility with appropriate resources  Outcome: Progressing     Problem: ABCDS Injury Assessment  Goal: Absence of physical injury  Outcome: Progressing     Problem: Safety - Adult  Goal: Free from fall injury  Outcome: Progressing     Problem: Nutrition Deficit:  Goal: Optimize nutritional status  Outcome: Progressing  Flowsheets (Taken 4/5/2024 1322 by Schwab, Allison C, RD, LD)  Nutrient intake appropriate for improving, restoring, or maintaining nutritional needs:   Assess nutritional status and recommend course of action   Monitor oral intake, labs, and treatment plans   Recommend appropriate diets, oral nutritional supplements, and vitamin/mineral supplements     Problem: Skin/Tissue Integrity  Goal: Absence of new skin breakdown  Description: 1.  Monitor for areas of redness and/or skin breakdown  2.  Assess vascular access sites hourly  3.  Every 4-6 hours minimum:  Change oxygen saturation probe site  4.  Every 4-6 hours:  If on nasal continuous positive airway pressure, respiratory therapy assess nares and determine need for appliance change or resting period.  Outcome: Progressing     Problem: Confusion  Goal: Confusion, delirium, dementia, or psychosis is improved or at baseline  Description: INTERVENTIONS:  1. Assess for possible contributors to thought disturbance, including medications, impaired vision or hearing, underlying metabolic abnormalities, dehydration, psychiatric diagnoses, and notify attending LIP  2. [place] high risk fall precautions, as indicated  3. Provide frequent short contacts to provide reality reorientation, refocusing and direction  4. Decrease environmental stimuli, including noise as appropriate  5. Monitor and intervene to maintain adequate nutrition, hydration, elimination, sleep and

## 2024-04-05 NOTE — PROGRESS NOTES
Comprehensive Nutrition Assessment    Type and Reason for Visit:  Reassess (po/supplement)    Nutrition Recommendations/Plan:   No BM recorded for 10 days, discussed with RN.  On colace, senokot, has prn glycolax given yesterday.  Ileus prevention in place-activia yogurt and hot beverage (eating yogurt per staff).  ONS: Ensure Plus TID (drinking per staff).  Continue current diet.  Recommend consider MVI.     Malnutrition Assessment:  Malnutrition Status:  Severe malnutrition (04/02/24 1422)    Context:  Chronic Illness     Findings of the 6 clinical characteristics of malnutrition:  Energy Intake:  75% or less estimated energy requirements for 1 month or longer  Weight Loss:  7.5% over 3 months (8% loss in 3 months from office visit weight of 108# 6.4 oz on 1/4/24)     Body Fat Loss:  Severe body fat loss Triceps, Fat Overlying Ribs, Buccal region, Orbital   Muscle Mass Loss:  Severe muscle mass loss Temples (temporalis), Scapula (trapezius), Clavicles (pectoralis & deltoids), Thigh (quadriceps), Calf (gastrocnemius)  Fluid Accumulation:  No significant fluid accumulation     Strength:  Not Performed    Nutrition Assessment:     Pt. with minimal improvement from a nutritional standpoint AEB meal intake 25% or less but is consuming ONS and yogurt.  Remains at risk for further nutritional compromise r/t severe malnutrition, constipation, acute L cerebellar hemorrhage on 3/31, s/p lumbar fusion L3-sacrum POD #7 - wound vac removed 3/29 and lumbar drain placed by IR 4/1 d/t CSF leak, hypertensive urgency, and underlying medical condition (PMHx: s/p lumbar fusion 1998 and 2003, current smoker).       Nutrition Related Findings:    Pt. Report/Treatments/Miscellaneous: Patient seen earlier this morning, live sitter in room.  Patient likes Ensure and eating yogurt, poor appetite, still has a lot of pain she reports.   GI Status: no BM since admit, 10 days  Pertinent Labs: 4/4/24: Sodium 134, BUN 29, Creatinine 0.5,  Progress toward Goal(s)  Goals: Meet at least 75% of estimated needs, by next RD assessment       Nutrition Monitoring and Evaluation:      Food/Nutrient Intake Outcomes: Food and Nutrient Intake, Supplement Intake  Physical Signs/Symptoms Outcomes: Biochemical Data, GI Status, Fluid Status or Edema, Nutrition Focused Physical Findings, Skin, Weight    Discharge Planning:    Continue Oral Nutrition Supplement, Too soon to determine     Allison C Schwab, RD, LD  Contact: [phone]

## 2024-04-05 NOTE — PROGRESS NOTES
Progress note: Infectious diseases    Patient - Alpa Marte,  Age - 68 y.o.    - 1955      Room Number - 4B-05/005-A   MRN -  [id number]   [facility] # - [id number]  Date of Admission -  3/26/2024  7:58 AM     Subjective:  Denies any fevers or chills.      OBJECTIVE   VITALS    height is 1.651 m (5' 5\") and weight is 47.7 kg (105 lb 2.6 oz). Her axillary temperature is 97.5 °F (36.4 °C). Her blood pressure is 137/73 and her pulse is 104 (abnormal). Her respiration is 26 and oxygen saturation is 89% (abnormal).       Wt Readings from Last 3 Encounters:   24 47.7 kg (105 lb 2.6 oz)   24 48.6 kg (107 lb 2.3 oz)   01/15/24 48.6 kg (107 lb 3.2 oz)       I/O (24 Hours)    Intake/Output Summary (Last 24 hours) at 2024 0853  Last data filed at 2024 0643  Gross per 24 hour   Intake 1056.93 ml   Output 2682.5 ml   Net -1625.57 ml       General Appearance  Awake, alert, oriented,  not  In acute distress  HEENT - normocephalic, atraumatic, pink conjunctiva,  anicteric sclera  Neck - Supple, no mass  Lungs -  Bilateral good air entry, no rhonchi, no wheeze  Cardiovascular - Heart sounds are normal.  Regular rate and rhythm without murmur, gallop or rub.  Abdomen - soft, not distended, nontender,   Neurologic -alert and awake, follows commands.    Skin - No bruising or bleeding  Extremities - No edema, no cyanosis, clubbing   Dressed lumbar wound. Drain in place. The dressing is soaked    MEDICATIONS:      cefTRIAXone (ROCEPHIN) IV  2,000 mg IntraVENous Q12H    DULoxetine  20 mg Oral Nightly    rOPINIRole  1 mg Oral Nightly    lidocaine  3 patch TransDERmal Daily    senna  2 tablet Oral Nightly    docusate sodium  100 mg Oral BID    famotidine  20 mg Oral Daily    amLODIPine  5 mg Oral Daily    [Held by provider] gabapentin  600 mg Oral QAM    And    [Held by provider] gabapentin  900 mg Oral Nightly    sodium  procedure.     Status post successful lumbar CSF drainage catheter exchange through same access.. **This report has been created using voice recognition software.  It may contain minor errors which are inherent in voice recognition technology.** Final report electronically signed by Dr. Umesh Fernandez on 4/3/2024 2:43 PM         Problem list of patient:     Patient Active Problem List   Diagnosis Code    S/P lumbar fusion Z98.1    Moderate malnutrition (HCC) E44.0    Transient alteration of awareness R40.4    Intracranial bleeding (HCC) I62.9    Cerebellar hemorrhage (HCC) I61.4    Severe malnutrition (HCC) E43    Meningitis G03.9    Post-op pain G89.18         ASSESSMENT/PLAN   E. coli bacteremia with meningitis: Continue Rocephin every 12 hours for 21 days in total.  Recent back surgery complicated with a dural leak: Has a lumbar drain.  Continues to have persistent drainage despite the drainConsider removing drain as cultures are still positive for E. coli.will  need repair of the dura surgically  CSF cultures show gram-negative bacilli with light growth.  Will repeat blood cultures today  Left cerebellar hemorrhage: On conservative treatment.      Wenceslao Gates DO, 4/5/2024 8:53 AM

## 2024-04-05 NOTE — PROGRESS NOTES
Dressing change to lumbar/incision area. Drainage saturated to 1 ABD/ABD pad and trasparent dressing used. Pt tolerated well.

## 2024-04-05 NOTE — PROGRESS NOTES
Pharmacy Consult    Alpa Marte is a 68 y.o. female for whom pharmacy has been consulted to dose intrathecal gentamicin for 3 days.    Patient Active Problem List   Diagnosis    S/P lumbar fusion    Moderate malnutrition (HCC)    Transient alteration of awareness    Intracranial bleeding (HCC)    Cerebellar hemorrhage (HCC)    Severe malnutrition (HCC)    Meningitis    Post-op pain     Allergies:  Patient has no known allergies.     Recent Labs     04/04/24  0513 04/05/24  0859   CREATININE 0.5 0.5     Ht/Wt:   Ht Readings from Last 1 Encounters:   03/26/24 1.651 m (5' 5\")        Wt Readings from Last 1 Encounters:   04/05/24 47.7 kg (105 lb 2.6 oz)       Estimated Creatinine Clearance: 82 mL/min (based on SCr of 0.5 mg/dL).    Culture Date Source Results   4/2/24 CSF panel E.coli   4/2/24 BCID E.coli   4/2/24 2/2 bld cx E.coli 'S' all but ampicillin   4/2/24 Both CSF cultures Heavy growth E.coli 'S' to all but ampicillin   4/4/24 CSF cx Light growth GNB     Assessment/Plan:  Per [employer], general dosing for intrathecal gentamicin for adults is 4-8mg/day. Will also include references below and screenshot of lexicomp dosing.   Patient renal function adequate and stable. Pt is underweight.  Plan to order dose of 6mg daily x3 days     Thank you for the consult. Will continue to follow.  Michelle Butterfield, PharmD   Pharmacy Resident  4/5/2024 12:30 PM    References:    Jeanninei S, Beboni H, Spencer N, Griffin M. [Intrathecal gentamicin in the treatment of meningitis--clinical case study (author's transl)]. No To Eusebio. 1978 Nov;30(11):1227-32. Yoruba. PMID: 365006.   Danielito M, Matt R, Ramakrishna Q, Juan Carlos P, Layton W, Julia C. Lumbar Cistern Drainage and Gentamicin Intrathecal Injection in the Treatment of Carbapenem-Resistant Klebsiella Pneumoniae Intracranial Infection After Intracerebral Hemorrhage craniotomy: A Case Report. Infect Drug [facility]ist. 2022 Nov 30;15:4688-7104. doi: 10.2147/IDR.A725772. PMID: 58492671; PMCID: [id number].

## 2024-04-05 NOTE — CARE COORDINATION
4/5/24, 10:45 AM EDT    DISCHARGE PLANNING EVALUATION     LANCE notified by 4B that family is requesting SNF in the [facility] or [place] area instead of IPR at [facility].  LANCE spoke with JAMES Draper on 4B, states pt is confused and will need to contact family.  SW called pts brother Ja who is listed as pts , left detailed message and asked that he return my phone call at his earliest convenience for discharge planning purposes.      Pc received from brother Ja, he prefers [facility] and his first choice, [facility] and second choice and [facility] and third choice.   SW made referrals to [facility] and [facility], spoke with Estrella.     LANCE spoke with Bryce in Admissions at [facility], she does not foresee a bed opening up until after 4-10 or possibly longer. No referral given.     0 = independent

## 2024-04-05 NOTE — PROGRESS NOTES
[facility]  INPATIENT PHYSICAL THERAPY  DAILY NOTE  [facility] CVICU 4B - 4B-05/005-A      Time In: 1410  Time Out: 1433  Timed Code Treatment Minutes: 23 Minutes  Minutes: 23          Date: 2024  Patient Name: Alpa Marte,  Gender:  female        MRN: [id number]  : 1955  (68 y.o.)     Referring Practitioner: Santino Stewart PA-C  Diagnosis: Adjacent segment disease of lumbar spine with history of fusion procedure  Additional Pertinent Hx: Per HPI, \"Alpa Marte is an 68 y.o.  female, an every day smoker tobacco and 1/2 pack day history who admits to regular alcohol use and has a medical history significant for restless leg syndrome and urinary incontinence with a surgical history that includes prior lumbar fusions in  and again in  performed at [facility] resulting in pedicle screw and garima instrumentation at lumbar 4 spanning to S1.  She presents today unaccompanied and ambulating without assistance with complaints of chronic worsening low back pain radiating primarily to the left lower extremity all the way to the foot including chronic weakness and chronic partial left foot drop.  She does not take any medication other than over-the-counter medications and treats with ice and heat and rest.  In the past she has been treated by pain specialist who provided some injection therapies (greater than 20 years prior) that provided only transient relief. A CT scan imaged on 2024 that reflect pedicle screws from L4-S1 along with laminectomies and a grade 3 anterior listhesis of L5 on S1 unchanged. The flexion-extension x-ray also shows the grade 3 to grade 4 anterior listhesis of L5 on S1 with no evidence of additional change in flexion or extension. SI joint imaging reveals some narrowing along with sclerosis for SI joints bilaterally.\"  Pt is now s/p Reexploration and Revision of Lumbar Instrumented Fusion for Extension of the Decompression and Instrumentation  to L3-Pelvis. Pt was noted to have dura leak during surgery and has been on bedrest since surgery on 3/26/24-- 3/31- MRI- Brain noted with acute hemorrhage left cerebellar hemisphere-  4/2 lumbar drain placed due to dura leak     Prior Level of Function:  Lives With: Alone  Type of Home: Trailer  Home Layout: One level  Home Access: Stairs to enter with rails  Home Equipment: Cane, Walker, rolling   Bathroom Shower/Tub: Tub/Shower unit  Bathroom Toilet: Standard  Bathroom Equipment: Tub transfer bench  Bathroom Accessibility: Accessible    Receives Help From: Friend(s)  ADL Assistance: Independent  Homemaking Assistance: Independent  Homemaking Responsibilities: Yes  Ambulation Assistance: Independent  Transfer Assistance: Independent  Active : Yes    Restrictions/Precautions:  Restrictions/Precautions: General Precautions, Fall Risk  Required Braces or Orthoses  Spinal: Lumbar Corset  Position Activity Restriction  Spinal Precautions: No Bending, No Lifting, No Twisting  Other position/activity restrictions: + CVA- 4/2 lumbar drain for dura leak       SUBJECTIVE: nrusing ok'd therapy however reported that she has been having a lot of pain and having drainagae around the lumbar drain-however no drainage on sheets on arrival for PT,  on arrival pt was sleeping and confused she didn't know where she was - pt looking up at the clock and was squinting and struggled to read it was confused even after I told her the time     PAIN: 8/10: back and buttocks     Vitals:  noted elevated RR w/ activity     OBJECTIVE:  Bed Mobility:  Rolling to Left: Minimal Assistance   Rolling to Right: Minimal Assistance   Pt intiailly wantign to lay on her right side but she was agreeable to try positioning onto her left side off her drain- positioned pillows behind her and between LEs for optimal positional relief and pt reported comfort at end   Transfers:  Not tested     Exercise:  Patient was guided in 1 set(s) 10 reps of exercise  negotiate 3-4 steps with 1 rail with Supervision for home access  Long Term Goals  Time Frame for Long Term Goals : not set due to short ELOS    Following session, patient left in safe position with all fall risk precautions in place.

## 2024-04-05 NOTE — PROGRESS NOTES
Gentamicin by Dr. Melendez via lumbar drain. Instructed to clamp lumbar drain for 2 hours. Drain clamped at this time. Will unclamp at 5 pm.

## 2024-04-05 NOTE — PROGRESS NOTES
CRITICAL CARE PROGRESS NOTE      Patient:   Alpa Marte    Unit/Bed: 4B-05/005-A  YOB: 1955  MRN:  [id number]   PCP:  Connie Augustin PA  Date of Admission:  3/26/2024    Chief Complaint:  Acute hemorrhage    Assessment and Plan:  Meningitis: Improving.  Leukocytosis peak 4/2, decreasing.  CSF molecular PCR 4/2 significant for E. coli K1.  Negative streptococci, negative Listeria. 19,780 leukocyte/cc CSF.  Patient encephalopathic beginning 4/2.  No fever, nuchal rigidity. No photophobia.  Empiric vancomycin, ampicillin discontinued 4/3.  Cultures show sensitivity to ceftriaxone.  ID following  Ceftriaxone 2g q12h beginning 4/2, 21 day course.  Acute left cerebellar hemorrhage: On 3/31, 12 x 7 mm area of acute hemorrhage left cerebellar hemisphere, with bilateral tentorium cerebri hemorrhage noted on MRI.  Neurology signed off, defer to neurosurgery. Wound VAC removed 3/29.  Neurosurgery following  Dexamethasone  PT OT  Up in chair as tolerated  Holding anticoagulation/antiplatelet. Resume per neurosurgery.  Hyponatremia, acute: improving  Monitor BMP  Acute encephalopathy: Improving. Secondary to meningitis, possibly lower contribution of urinary retention.  Patient required 3 straight caths 4/2, each draining over 400 cc urine.  Lama catheter  S/p lumbar fusion L3-sacrum: POD 10.  Similar to prior procedures in 1989, 2003 at [facility].  Wound VAC removed 3/29.  Due to CSF leak, lumbar drain by IR on 4/1.  Lumbar drain line kinked on 4/2.  Lumbar CT 4/3 shows some extrathecal gas.  Lumbar drain revised 4/3.  Neurosurgery following.  10-15 cc/h drainage target.  Hypertensive urgency: On no home antihypertensives. /97 on 3/26. As needed labetalol since 3/26, continues to have uncontrolled and labile blood pressure.  Continue amlodipine 5 mg po qd  Labetalol 5 mg IV q4h prn  Restless leg syndrome: Elevated ferritin.  No anemia.  Continue home Requip  Depression: Continue home  mentation.      Past 24 Hr Progress:  Per neurosurgery request, will continue to monitor patient in the ICU.  Patient's meningitis continues to improve.  Leukocytosis improved.  Patient mentation is improving, though she still has some intermittent hallucinations, she notes.  She was able to participate with interview more coherently today than yesterday.  Infectious disease follows, collected to blood cultures today.    Past Medical History: Restless leg syndrome, arthritis  Family History:   High blood pressure, high cholesterol, cancer, diabetes  Social History:   Occasional alcohol use, half pack per day smoker    Scheduled Meds:   cefTRIAXone (ROCEPHIN) IV  2,000 mg IntraVENous Q12H    DULoxetine  20 mg Oral Nightly    rOPINIRole  1 mg Oral Nightly    lidocaine  3 patch TransDERmal Daily    senna  2 tablet Oral Nightly    docusate sodium  100 mg Oral BID    famotidine  20 mg Oral Daily    amLODIPine  5 mg Oral Daily    [Held by provider] gabapentin  600 mg Oral QAM    And    [Held by provider] gabapentin  900 mg Oral Nightly    sodium chloride flush  5-40 mL IntraVENous 2 times per day    dexAMETHasone  4 mg IntraVENous Q6H     Continuous Infusions:   sodium chloride Stopped (04/04/24 1817)       PHYSICAL EXAMINATION:  T: 97.5.  P: 104. RR: 26. B/P: 155/99.  FiO2: 21%. O2 Sat: 98%.  I/O: -1.7 L  Body mass index is 17.5 kg/m².     General:   Frail-appearing female, underweight.  Somewhat confused.  HEENT:  normocephalic and atraumatic.  No scleral icterus. PERR  Neck: supple.  No Thyromegaly.  Lungs: clear to auscultation.  No retractions  Cardiac: Normal sinus rhythm.  No JVD.  No murmurs or abnormal heart sounds.  Abdomen: soft.  Nontender.  Lumbar drain with no kinks  Extremities:  No clubbing, cyanosis, or edema x 4.    Vasculature: capillary refill < 3 seconds. Palpable dorsalis pedis pulses.  Skin:  warm and dry.  Psych:  Alert and oriented x3.  Affect appropriate  Lymph:  No supraclavicular

## 2024-04-06 LAB
ALBUMIN SERPL BCG-MCNC: 2.7 G/DL (ref 3.5–5.1)
ALP SERPL-CCNC: 63 U/L (ref 38–126)
ALT SERPL W/O P-5'-P-CCNC: 32 U/L (ref 11–66)
ANION GAP SERPL CALC-SCNC: 12 MEQ/L (ref 8–16)
AST SERPL-CCNC: 21 U/L (ref 5–40)
BILIRUB CONJ SERPL-MCNC: < 0.2 MG/DL (ref 0–0.3)
BILIRUB SERPL-MCNC: 0.5 MG/DL (ref 0.3–1.2)
BUN SERPL-MCNC: 25 MG/DL (ref 7–22)
CALCIUM SERPL-MCNC: 7.7 MG/DL (ref 8.5–10.5)
CHLORIDE SERPL-SCNC: 99 MEQ/L (ref 98–111)
CO2 SERPL-SCNC: 22 MEQ/L (ref 23–33)
CREAT SERPL-MCNC: 0.4 MG/DL (ref 0.4–1.2)
DEPRECATED RDW RBC AUTO: 46.6 FL (ref 35–45)
ERYTHROCYTE [DISTWIDTH] IN BLOOD BY AUTOMATED COUNT: 13.2 % (ref 11.5–14.5)
GFR SERPL CREATININE-BSD FRML MDRD: > 90 ML/MIN/1.73M2
GLUCOSE SERPL-MCNC: 97 MG/DL (ref 70–108)
HCT VFR BLD AUTO: 30 % (ref 37–47)
HGB BLD-MCNC: 10.4 GM/DL (ref 12–16)
LACTATE SERPL-SCNC: 0.9 MMOL/L (ref 0.5–2)
MCH RBC QN AUTO: 33.5 PG (ref 26–33)
MCHC RBC AUTO-ENTMCNC: 34.7 GM/DL (ref 32.2–35.5)
MCV RBC AUTO: 96.8 FL (ref 81–99)
PLATELET # BLD AUTO: 229 THOU/MM3 (ref 130–400)
PMV BLD AUTO: 11.1 FL (ref 9.4–12.4)
POTASSIUM SERPL-SCNC: 3.9 MEQ/L (ref 3.5–5.2)
PROT SERPL-MCNC: 5.2 G/DL (ref 6.1–8)
RBC # BLD AUTO: 3.1 MILL/MM3 (ref 4.2–5.4)
SODIUM SERPL-SCNC: 133 MEQ/L (ref 135–145)
WBC # BLD AUTO: 22.3 THOU/MM3 (ref 4.8–10.8)

## 2024-04-06 PROCEDURE — 6360000002 HC RX W HCPCS: Performed by: INTERNAL MEDICINE

## 2024-04-06 PROCEDURE — APPSS60 APP SPLIT SHARED TIME 46-60 MINUTES

## 2024-04-06 PROCEDURE — 82248 BILIRUBIN DIRECT: CPT

## 2024-04-06 PROCEDURE — 83605 ASSAY OF LACTIC ACID: CPT

## 2024-04-06 PROCEDURE — 2500000003 HC RX 250 WO HCPCS

## 2024-04-06 PROCEDURE — 2580000003 HC RX 258: Performed by: PHYSICIAN ASSISTANT

## 2024-04-06 PROCEDURE — 6370000000 HC RX 637 (ALT 250 FOR IP): Performed by: PHYSICIAN ASSISTANT

## 2024-04-06 PROCEDURE — 6360000002 HC RX W HCPCS

## 2024-04-06 PROCEDURE — 6370000000 HC RX 637 (ALT 250 FOR IP): Performed by: NURSE PRACTITIONER

## 2024-04-06 PROCEDURE — 80053 COMPREHEN METABOLIC PANEL: CPT

## 2024-04-06 PROCEDURE — 36415 COLL VENOUS BLD VENIPUNCTURE: CPT

## 2024-04-06 PROCEDURE — 6360000002 HC RX W HCPCS: Performed by: STUDENT IN AN ORGANIZED HEALTH CARE EDUCATION/TRAINING PROGRAM

## 2024-04-06 PROCEDURE — 2580000003 HC RX 258

## 2024-04-06 PROCEDURE — 85027 COMPLETE CBC AUTOMATED: CPT

## 2024-04-06 PROCEDURE — 2100000000 HC CCU R&B

## 2024-04-06 PROCEDURE — 99233 SBSQ HOSP IP/OBS HIGH 50: CPT | Performed by: INTERNAL MEDICINE

## 2024-04-06 PROCEDURE — 2580000003 HC RX 258: Performed by: INTERNAL MEDICINE

## 2024-04-06 PROCEDURE — 6370000000 HC RX 637 (ALT 250 FOR IP): Performed by: STUDENT IN AN ORGANIZED HEALTH CARE EDUCATION/TRAINING PROGRAM

## 2024-04-06 PROCEDURE — 2580000003 HC RX 258: Performed by: STUDENT IN AN ORGANIZED HEALTH CARE EDUCATION/TRAINING PROGRAM

## 2024-04-06 RX ORDER — TRAZODONE HYDROCHLORIDE 100 MG/1
100 TABLET ORAL NIGHTLY
Status: DISCONTINUED | OUTPATIENT
Start: 2024-04-06 | End: 2024-04-06

## 2024-04-06 RX ORDER — LIDOCAINE HYDROCHLORIDE 10 MG/ML
5 INJECTION, SOLUTION EPIDURAL; INFILTRATION; INTRACAUDAL; PERINEURAL ONCE
Status: DISCONTINUED | OUTPATIENT
Start: 2024-04-06 | End: 2024-04-06

## 2024-04-06 RX ORDER — SODIUM CHLORIDE 0.9 % (FLUSH) 0.9 %
5-40 SYRINGE (ML) INJECTION EVERY 12 HOURS SCHEDULED
Status: DISCONTINUED | OUTPATIENT
Start: 2024-04-06 | End: 2024-04-06

## 2024-04-06 RX ORDER — 0.9 % SODIUM CHLORIDE 0.9 %
500 INTRAVENOUS SOLUTION INTRAVENOUS ONCE
Status: COMPLETED | OUTPATIENT
Start: 2024-04-06 | End: 2024-04-06

## 2024-04-06 RX ORDER — TRAZODONE HYDROCHLORIDE 50 MG/1
50 TABLET ORAL NIGHTLY
Status: DISCONTINUED | OUTPATIENT
Start: 2024-04-07 | End: 2024-04-07

## 2024-04-06 RX ORDER — SODIUM CHLORIDE 0.9 % (FLUSH) 0.9 %
5-40 SYRINGE (ML) INJECTION PRN
Status: DISCONTINUED | OUTPATIENT
Start: 2024-04-06 | End: 2024-04-06

## 2024-04-06 RX ORDER — SODIUM CHLORIDE 9 MG/ML
25 INJECTION, SOLUTION INTRAVENOUS PRN
Status: DISCONTINUED | OUTPATIENT
Start: 2024-04-06 | End: 2024-04-06

## 2024-04-06 RX ORDER — SODIUM CHLORIDE, SODIUM LACTATE, POTASSIUM CHLORIDE, AND CALCIUM CHLORIDE .6; .31; .03; .02 G/100ML; G/100ML; G/100ML; G/100ML
1000 INJECTION, SOLUTION INTRAVENOUS ONCE
Status: COMPLETED | OUTPATIENT
Start: 2024-04-06 | End: 2024-04-06

## 2024-04-06 RX ORDER — 0.9 % SODIUM CHLORIDE 0.9 %
500 INTRAVENOUS SOLUTION INTRAVENOUS ONCE
Status: COMPLETED | OUTPATIENT
Start: 2024-04-06 | End: 2024-04-07

## 2024-04-06 RX ADMIN — HYDROMORPHONE HYDROCHLORIDE 0.5 MG: 1 INJECTION, SOLUTION INTRAMUSCULAR; INTRAVENOUS; SUBCUTANEOUS at 08:44

## 2024-04-06 RX ADMIN — HYDROCODONE BITARTRATE AND ACETAMINOPHEN 2 TABLET: 5; 325 TABLET ORAL at 00:47

## 2024-04-06 RX ADMIN — CEFTRIAXONE SODIUM 2000 MG: 2 INJECTION, POWDER, FOR SOLUTION INTRAMUSCULAR; INTRAVENOUS at 06:00

## 2024-04-06 RX ADMIN — SODIUM CHLORIDE: 9 INJECTION, SOLUTION INTRAVENOUS at 05:58

## 2024-04-06 RX ADMIN — SODIUM CHLORIDE 500 ML: 9 INJECTION, SOLUTION INTRAVENOUS at 03:13

## 2024-04-06 RX ADMIN — CEFTRIAXONE SODIUM 2000 MG: 2 INJECTION, POWDER, FOR SOLUTION INTRAMUSCULAR; INTRAVENOUS at 15:34

## 2024-04-06 RX ADMIN — SODIUM CHLORIDE, PRESERVATIVE FREE 10 ML: 5 INJECTION INTRAVENOUS at 08:45

## 2024-04-06 RX ADMIN — TRAZODONE HYDROCHLORIDE 100 MG: 100 TABLET ORAL at 20:05

## 2024-04-06 RX ADMIN — SODIUM CHLORIDE, POTASSIUM CHLORIDE, SODIUM LACTATE AND CALCIUM CHLORIDE 1000 ML: 600; 310; 30; 20 INJECTION, SOLUTION INTRAVENOUS at 13:08

## 2024-04-06 RX ADMIN — FAMOTIDINE 20 MG: 20 TABLET, FILM COATED ORAL at 08:44

## 2024-04-06 RX ADMIN — GENTAMICIN: 10 INJECTION, SOLUTION INTRAMUSCULAR; INTRAVENOUS at 10:28

## 2024-04-06 RX ADMIN — CYCLOBENZAPRINE 10 MG: 10 TABLET, FILM COATED ORAL at 13:56

## 2024-04-06 RX ADMIN — HYDROCODONE BITARTRATE AND ACETAMINOPHEN 1 TABLET: 5; 325 TABLET ORAL at 05:48

## 2024-04-06 RX ADMIN — HYDROMORPHONE HYDROCHLORIDE 0.5 MG: 1 INJECTION, SOLUTION INTRAMUSCULAR; INTRAVENOUS; SUBCUTANEOUS at 12:52

## 2024-04-06 RX ADMIN — DOCUSATE SODIUM 100 MG: 100 CAPSULE, LIQUID FILLED ORAL at 08:44

## 2024-04-06 RX ADMIN — HYDROCODONE BITARTRATE AND ACETAMINOPHEN 2 TABLET: 5; 325 TABLET ORAL at 20:05

## 2024-04-06 RX ADMIN — ROPINIROLE HYDROCHLORIDE 1 MG: 1 TABLET, FILM COATED ORAL at 20:06

## 2024-04-06 RX ADMIN — HYDROMORPHONE HYDROCHLORIDE 0.5 MG: 1 INJECTION, SOLUTION INTRAMUSCULAR; INTRAVENOUS; SUBCUTANEOUS at 18:13

## 2024-04-06 RX ADMIN — SODIUM CHLORIDE 500 ML: 9 INJECTION, SOLUTION INTRAVENOUS at 23:11

## 2024-04-06 RX ADMIN — SENNOSIDES 17.2 MG: 8.6 TABLET ORAL at 20:06

## 2024-04-06 RX ADMIN — Medication 0.8 MCG/KG/HR: at 17:35

## 2024-04-06 RX ADMIN — SODIUM CHLORIDE, PRESERVATIVE FREE 10 ML: 5 INJECTION INTRAVENOUS at 20:06

## 2024-04-06 RX ADMIN — HYDROCODONE BITARTRATE AND ACETAMINOPHEN 2 TABLET: 5; 325 TABLET ORAL at 13:59

## 2024-04-06 RX ADMIN — AMLODIPINE BESYLATE 5 MG: 5 TABLET ORAL at 08:44

## 2024-04-06 RX ADMIN — DULOXETINE HYDROCHLORIDE 20 MG: 20 CAPSULE, DELAYED RELEASE ORAL at 20:06

## 2024-04-06 ASSESSMENT — PAIN DESCRIPTION - LOCATION
LOCATION: BACK

## 2024-04-06 ASSESSMENT — PAIN SCALES - GENERAL
PAINLEVEL_OUTOF10: 10
PAINLEVEL_OUTOF10: 7
PAINLEVEL_OUTOF10: 10
PAINLEVEL_OUTOF10: 6
PAINLEVEL_OUTOF10: 9

## 2024-04-06 ASSESSMENT — PAIN DESCRIPTION - ONSET
ONSET: ON-GOING
ONSET: ON-GOING

## 2024-04-06 ASSESSMENT — PAIN DESCRIPTION - DESCRIPTORS
DESCRIPTORS: STABBING
DESCRIPTORS: SHARP;STABBING
DESCRIPTORS: STABBING
DESCRIPTORS: STABBING

## 2024-04-06 ASSESSMENT — PAIN DESCRIPTION - FREQUENCY
FREQUENCY: CONTINUOUS
FREQUENCY: CONTINUOUS

## 2024-04-06 ASSESSMENT — PAIN DESCRIPTION - ORIENTATION
ORIENTATION: LOWER;MID
ORIENTATION: LOWER
ORIENTATION: MID;LOWER
ORIENTATION: LOWER

## 2024-04-06 ASSESSMENT — PAIN - FUNCTIONAL ASSESSMENT
PAIN_FUNCTIONAL_ASSESSMENT: PREVENTS OR INTERFERES SOME ACTIVE ACTIVITIES AND ADLS
PAIN_FUNCTIONAL_ASSESSMENT: PREVENTS OR INTERFERES SOME ACTIVE ACTIVITIES AND ADLS

## 2024-04-06 ASSESSMENT — PAIN DESCRIPTION - PAIN TYPE: TYPE: SURGICAL PAIN;CHRONIC PAIN

## 2024-04-06 NOTE — PROGRESS NOTES
CRITICAL CARE MEDICINE Progress notes   Patient:  Alpa Marte    Unit/Bed:4B-05/005-A  MRN: [id number]   PCP: Connie Augustin PA  Date of Admission: 3/26/2024    Assessment and Plan(All pulmonary edema, renal failure, PE, and respiratory failure diagnoses are acute in nature unless otherwise specified):        E. coli meningitis and bacteremia:  continue IV Rocephin every 12 hours for 21 days total, started 4/2/2024.  Will need PICC line once blood cultures are negative.  Infectious disease following.  Receiving gentamicin intrathecally for 3 days to help clean the CSF. Repeat blood cultures pending.  Recent lumbar fusion L3-sacrum complicated with dural leak: Wound VAC removed 3/29. Neurosurgery considering removal of lumbar drain after 3 days of gentamicin. Maintain lumbar drain at 15 cc/hr.   Left cerebellar hemorrhage: On 3/31, CT head revealed 12 x 7 mm area of acute hemorrhage left cerebellar hemisphere, with bilateral tentorium cerebri hemorrhage noted on MRI. Neurosurgery following. Has been stable on repeat CT head 4/1.  Encephalopathy: Improving.  Likely secondary to meningitis and delirium.  Will start patient on trazodone 100 mg nightly.  Primary HTN: Continue norvasc.  Restless leg syndrome: Continue Requip.  Depression: Continue Cymbalta.  SANTY: Resolved.  HAGMA: Resolved.     CC:  lumbar fusion with dural leak   HPI: Per admitting HPI \"Alpa Marte is a 68 y.o. female with past medical history of chronic lumbar back pain, restless leg syndrome, urinary incontinence, tobacco abuse, regular alcohol use, and arthritis who came in to [facility] for her lumbar surgical intervention with neurosurgery, Dr. Caldera. Chart review reveals that patient had surgical history significant for lumbar fusions in 1989 and again in 2003 done at [facility] resulting in pedicle screw and garima instrumentation at lumbar 4 spanning to S1. Reported that patient was recently seen and evaluated by Dr. Caldera as a referral  in the last 72 hours.  INR: No results for input(s): \"INR\", \"PROTIME\" in the last 72 hours.  POC No results for input(s): \"POCGLU\" in the last 72 hours.  Recent Labs     04/06/24  0359   LACTA 0.9        dexmedeTOMIDine 0.8 mcg/kg/hr (04/06/24 1735)    sodium chloride 10 mL/hr at 04/06/24 1425        traZODone  100 mg Oral Nightly    gentamicin (PF) 6 mg in Sodium chloride 0.9% 5 ml syringe-INTRATHECAL   Other Daily    cefTRIAXone (ROCEPHIN) IV  2,000 mg IntraVENous Q12H    DULoxetine  20 mg Oral Nightly    rOPINIRole  1 mg Oral Nightly    lidocaine  3 patch TransDERmal Daily    senna  2 tablet Oral Nightly    famotidine  20 mg Oral Daily    amLODIPine  5 mg Oral Daily    [Held by provider] gabapentin  600 mg Oral QAM    And    [Held by provider] gabapentin  900 mg Oral Nightly    sodium chloride flush  5-40 mL IntraVENous 2 times per day       24HR INTAKE/OUTPUT:    Intake/Output Summary (Last 24 hours) at 4/6/2024 1748  Last data filed at 4/6/2024 1700  Gross per 24 hour   Intake 1703.82 ml   Output 1404 ml   Net 299.82 ml     DVT prophylaxis reviewed.  SCDs to both lower extremities.  Discussed with Dr. Charlie Caldera MD from neurosurgery service regarding patient management plan.    Electronically signed by   Enrrique Rivera MD on 4/6/2024 at 5:48 PM

## 2024-04-06 NOTE — PROGRESS NOTES
Neurosurgery Progress Note     Date: 4/6/2024   Patient:  Alpa Marte  YOB: 1955  Age: 68 y.o.  MRN: [id number]       Chief Complaint:   No chief complaint on file.    Subjective     HPI -  68 y.o. female who presented to [facility] for a planned surgical intervention. She presented with complaints of chronic worsening low back pain radiating primarily to the left lower extremity all the way to the foot including chronic weakness and chronic partial left foot drop.  She does not take any medication other than over-the-counter medications and treats with ice and heat and rest.  In the past she has been treated by pain specialist who provided some injection therapies (greater than 20 years prior) that provided only transient relief. A CT scan imaged on 2/21/2024 that reflect pedicle screws from L4-S1 along with laminectomies and a grade 3 anterior listhesis of L5 on S1 unchanged. The flexion-extension x-ray also shows the grade 3 to grade 4 anterior listhesis of L5 on S1 with no evidence of additional change in flexion or extension. SI joint imaging reveals some narrowing along with sclerosis for SI joints bilaterally.      Interval History -   4/6/24: POD #11. Patient continues have some confusion. No fevers overnight. Intrathecal gentamycin started yesterday.     Review of Systems   Review of Systems   Constitutional:  Positive for activity change.   Musculoskeletal:  Positive for back pain.   Skin:  Positive for wound.   Psychiatric/Behavioral:  Positive for agitation, behavioral problems and confusion.        Medications     Current Facility-Administered Medications:     HYDROmorphone (DILAUDID) injection 0.25 mg, 0.25 mg, IntraVENous, Q3H PRN **OR** HYDROmorphone (DILAUDID) injection 0.5 mg, 0.5 mg, IntraVENous, Q3H PRN, Caballero, Gato, DO, 0.5 mg at 04/06/24 0844    traZODone (DESYREL) tablet 100 mg, 100 mg, Oral, Nightly, Caballero, Gato, DO    lactated ringers bolus 1,000 mL, 1,000 mL,  Purulent drainage from lumbar drain site, surgical incision is healing well with sutures intact and no signs of erythema or discharge.   Psychiatric: confused but pleasant, some incoherent thoughts.   Neuro Exam: Awake and alert, oriented to self. Face is symmetric. Able to roll to either side without assistance.       Labs/Imaging/Diagnostics     Recent Labs     04/04/24  0513 04/05/24  0859 04/06/24  0359   WBC 19.8* 13.1* 22.3*   HGB 12.0 13.0 10.4*   HCT 34.9* 36.8* 30.0*    270 229     Recent Labs     04/04/24  0513 04/05/24  0859 04/06/24  0359   * 134* 133*   K 4.3 4.3 3.9    97* 99   CO2 22* 23 22*   BUN 29* 26* 25*   CREATININE 0.5 0.5 0.4   CALCIUM 8.7 8.9 7.7*     Recent Labs     04/06/24  0359   AST 21   ALT 32   BILIDIR <0.2   BILITOT 0.5   ALKPHOS 63     No results for input(s): \"INR\" in the last 72 hours.  No results for input(s): \"CKTOTAL\", \"TROPONINI\" in the last 72 hours.  Assessment and Plan:        Active Hospital Problems    Diagnosis Date Noted    Post-op pain [G89.18] 04/03/2024    Severe malnutrition (HCC) [E43] 04/02/2024    Meningitis [G03.9] 04/02/2024    Intracranial bleeding (HCC) [I62.9] 03/31/2024    Cerebellar hemorrhage (HCC) [I61.4] 03/31/2024    Transient alteration of awareness [R40.4] 03/30/2024    S/P lumbar fusion [Z98.1] 03/26/2024     PLAN:  - Lumbar fusion on 3/26/24  -  Intrathecal gentamycin given today without incident, keep drain clamped x 2 hrs   - Another dose of intrathecal gentamycin tomorrow   - Appreciate additional recommendation per ID  - Keep dressings clean and dry, dressing changes as needed   - Maintain lumbar drain at 15 cc/hr, considering removal lumbar drain after 3 days of gentamicin.   - L cerebellar hemorrhage initially noted on 3/31, stable CTH on 4/1  Neurosurgery following.  Please reach out with any further questions or concerns.      This case was discussed with Dr. Caldera and he is in agreement with the assessment and

## 2024-04-06 NOTE — PROGRESS NOTES
Progress note: Infectious diseases    Patient - Alpa Marte,  Age - 68 y.o.    - 1955      Room Number - 4B-05/005-A   MRN -  [id number]   [facility] # - [id number]  Date of Admission -  3/26/2024  7:58 AM     Subjective:  Complaining of pain  Still has active leak      OBJECTIVE   VITALS    height is 1.651 m (5' 5\") and weight is 47.7 kg (105 lb 2.6 oz). Her oral temperature is 98.2 °F (36.8 °C). Her blood pressure is 141/111 (abnormal) and her pulse is 112 (abnormal). Her respiration is 20 and oxygen saturation is 73% (abnormal).       Wt Readings from Last 3 Encounters:   24 47.7 kg (105 lb 2.6 oz)   24 48.6 kg (107 lb 2.3 oz)   01/15/24 48.6 kg (107 lb 3.2 oz)       I/O (24 Hours)    Intake/Output Summary (Last 24 hours) at 2024 1423  Last data filed at 2024 1405  Gross per 24 hour   Intake 1437.04 ml   Output 1155.9 ml   Net 281.14 ml         General Appearance  Awake, alert, sick looking  HEENT - normocephalic, atraumatic, pink conjunctiva,  anicteric sclera dry oral mucosa  Neck - Supple, no mass  Lungs -  Bilateral   air entry, no rhonchi, no wheeze  Cardiovascular - Heart sounds are normal.     Abdomen - soft, not distended, nontender,   Neurologic -alert and awake,    Skin - No bruising or bleeding  Extremities - No edema, no cyanosis, clubbing   Dressed lumbar wound. Drain in place. Still active drainage    MEDICATIONS:      traZODone  100 mg Oral Nightly    gentamicin (PF) 6 mg in Sodium chloride 0.9% 5 ml syringe-INTRATHECAL   Other Daily    cefTRIAXone (ROCEPHIN) IV  2,000 mg IntraVENous Q12H    DULoxetine  20 mg Oral Nightly    rOPINIRole  1 mg Oral Nightly    lidocaine  3 patch TransDERmal Daily    senna  2 tablet Oral Nightly    famotidine  20 mg Oral Daily    amLODIPine  5 mg Oral Daily    [Held by provider] gabapentin  600 mg Oral QAM    And    [Held by provider] gabapentin  900 mg  \"AMORPHOS\" in the last 72 hours.    Invalid input(s): \"CRYSTALS\"    Micro:   Lab Results   Component Value Date/Time    BC No growth 24 hours. 04/05/2024 09:53 AM    BC No growth 24 hours. 04/05/2024 09:53 AM         IMAGING:   IR FLUORO GUIDED NEEDLE PLACEMENT    Result Date: 4/3/2024  LUMBAR CSF DRAINAGE CATHETER EXCHANGE THROUGH SAME ACCESS: PERFORMED BY: Umesh Fernandez M.D. CLINICAL INFORMATION: Recent spinal surgery. Continuous spinal leak. Nonfunctioning indwelling catheter.. INDWELLING CATHETER: 5 [place] UniFuse infusion catheter with a 10 cm infusion length NEW CATHETER: 5 [place] UniFuse infusion catheter with a 10 cm infusion length. CATHETER TIP: T9 level. FLUOROSCOPY TIME: 0.8 minutes FLUOROSCOPIC IMAGES: 2 REFERENCE AIR KERMA: 2 mGy ESTIMATED BLOOD LOSS: Minimal SEDATION: Versed 1 mg; fentanyl 50 mcg, IV; the patient was sedated during this procedure,  and monitored with EKG and pulse ox monitoring devices by a registered nurse. Face-to-face time with patient 30 minutes. PROCEDURE:  Signed informed consent was obtained prior to performing this procedure.  The patient was sedated during this procedure and was monitored with EKG and pulse-ox monitoring devices by a registered nurse. Evaluation of the drainage catheter revealed kinking at 2 sites. Attempts to aspirate fluid through the catheter were met with great resistance. The exposed portion of the catheter and surrounding skin were prepped and draped in a sterile fashion. Following local anesthesia and utilizing MAXIMAL STERILE BARRIER TECHNIQUE, the indwelling catheter was removed over a guidewire, with fluoroscopic guidance, and a new catheter, as listed above, was advanced over the guidewire with its upper tip positioned at the T9 level. This was performed with fluoroscopic guidance. The catheter was unlooped over the right lower lumbar and gluteal region in a fashion to hopefully avoid future kinking. A fluoroscopic spot film was obtained to

## 2024-04-06 NOTE — PLAN OF CARE
Problem: Discharge Planning  Goal: Discharge to home or other facility with appropriate resources  4/6/2024 1024 by Bonny Kennedy RN  Outcome: Progressing     Problem: Pain  Goal: Verbalizes/displays adequate comfort level or baseline comfort level  4/6/2024 1024 by Bonny Kennedy RN  Outcome: Progressing     Problem: ABCDS Injury Assessment  Goal: Absence of physical injury  4/6/2024 1024 by Bonny Kennedy RN  Outcome: Progressing     Problem: Safety - Adult  Goal: Free from fall injury  4/6/2024 1024 by Bonny Kennedy RN  Outcome: Progressing     Problem: Nutrition Deficit:  Goal: Optimize nutritional status  4/6/2024 1024 by Bonny Kennedy RN  Outcome: Progressing     Problem: Skin/Tissue Integrity  Goal: Absence of new skin breakdown  Description: 1.  Monitor for areas of redness and/or skin breakdown  2.  Assess vascular access sites hourly  3.  Every 4-6 hours minimum:  Change oxygen saturation probe site  4.  Every 4-6 hours:  If on nasal continuous positive airway pressure, respiratory therapy assess nares and determine need for appliance change or resting period.  4/6/2024 1024 by Bonny Kennedy RN  Outcome: Progressing     Problem: Musculoskeletal - Adult  Goal: Return mobility to safest level of function  4/6/2024 1024 by Bonny Kennedy RN  Outcome: Progressing     Problem: Musculoskeletal - Adult  Goal: Return ADL status to a safe level of function  Outcome: Progressing     Problem: Gastrointestinal - Adult  Goal: Maintains or returns to baseline bowel function  4/6/2024 1024 by Bonny Kennedy RN  Outcome: Progressing     Problem: Neurosensory - Adult  Goal: Achieves stable or improved neurological status  4/6/2024 1024 by Bonny Kennedy RN  Outcome: Progressing     Problem: Neurosensory - Adult  Goal: Achieves maximal functionality and self care  4/6/2024 1024 by Bonny Kennedy RN  Outcome: Progressing    Care plan reviewed with patient.  Patient verbalizes understanding of the care plan and

## 2024-04-06 NOTE — PLAN OF CARE
nutrition, hydration, elimination, sleep and activity  6. If unable to ensure safety without constant attention obtain sitter and review sitter guidelines with assigned personnel  7. Initiate Psychosocial CNS and Spiritual Care consult, as indicated  Outcome: Progressing  Flowsheets (Taken 4/3/2024 2015 by Gaye Ramirez RN)  Effect of thought disturbance (confusion, delirium, dementia, or psychosis) are managed with adequate functional status:   Assess for contributors to thought disturbance, including medications, impaired vision or hearing, underlying metabolic abnormalities, dehydration, psychiatric diagnoses, notify [facility]   [place] high risk fall precautions, as indicated   Provide frequent short contacts to provide reality reorientation, refocusing and direction   Decrease environmental stimuli, including noise as appropriate   Monitor and intervene to maintain adequate nutrition, hydration, elimination, sleep and activity     Problem: Musculoskeletal - Adult  Goal: Return mobility to safest level of function  4/6/2024 1923 by Azra Ochoa RN  Outcome: Progressing  Flowsheets (Taken 4/3/2024 2015 by Gaye Ramirez RN)  Return Mobility to Safest Level of Function:   Assess patient stability and activity tolerance for standing, transferring and ambulating with or without assistive devices   Assist with transfers and ambulation using safe patient handling equipment as needed   Ensure adequate protection for wounds/incisions during mobilization   Instruct patient/family in ordered activity level  4/6/2024 1024 by Bonny Kennedy RN  Outcome: Progressing  Goal: Return ADL status to a safe level of function  4/6/2024 1024 by Bonny Kennedy RN  Outcome: Progressing     Problem: Gastrointestinal - Adult  Goal: Maintains or returns to baseline bowel function  4/6/2024 1923 by Azra Ochoa RN  Outcome: Progressing  Flowsheets (Taken 4/3/2024 2015 by Gaye Ramirez RN)  Maintains or returns to baseline bowel  function:   Assess bowel function   Encourage oral fluids to ensure adequate hydration   Administer IV fluids as ordered to ensure adequate hydration   Administer ordered medications as needed   Encourage mobilization and activity  4/6/2024 1024 by Bonny Kennedy RN  Outcome: Progressing     Problem: Neurosensory - Adult  Goal: Achieves stable or improved neurological status  4/6/2024 1923 by Azra Ochoa RN  Outcome: Progressing  Flowsheets (Taken 4/3/2024 2015 by Gaye Ramirez RN)  Achieves stable or improved neurological status:   Assess for and report changes in neurological status   Initiate measures to prevent increased intracranial pressure   Maintain blood pressure and fluid volume within ordered parameters to optimize cerebral perfusion and minimize risk of hemorrhage   Monitor temperature, glucose, and sodium. Initiate appropriate interventions as ordered  Note: Neuro status stable, will continue to monitor per order or every 4 hours and as needed.    4/6/2024 1024 by Bonny Kennedy RN  Outcome: Progressing  Goal: Achieves maximal functionality and self care  4/6/2024 1024 by Bonny Kennedy RN  Outcome: Progressing     Problem: Skin/Tissue Integrity - Adult  Goal: Incisions, wounds, or drain sites healing without S/S of infection  4/6/2024 1923 by Azra Ochoa RN  Outcome: Progressing  Flowsheets (Taken 4/5/2024 2322)  Incisions, Wounds, or Drain Sites Healing Without Sign and Symptoms of Infection:   ADMISSION and DAILY: Assess and document risk factors for pressure ulcer development   TWICE DAILY: Assess and document skin integrity   TWICE DAILY: Assess and document dressing/incision, wound bed, drain sites and surrounding tissue  Note: Skin integrity remains CDI, pt turned every 2 hours, heels elevated off bed. Will continue to complete skin assessments.     4/6/2024 1024 by Bonny Kennedy RN  Outcome: Progressing  Flowsheets (Taken 4/5/2024 2322 by Azra Ochoa RN)  Incisions, Wounds, or Drain

## 2024-04-06 NOTE — PLAN OF CARE
Problem: Discharge Planning  Goal: Discharge to home or other facility with appropriate resources  4/5/2024 2317 by Azra Ochoa RN  Outcome: Progressing  Flowsheets (Taken 4/1/2024 1712 by Tere Taylor RN)  Discharge to home or other facility with appropriate resources:   Identify barriers to discharge with patient and caregiver   Arrange for needed discharge resources and transportation as appropriate   Identify discharge learning needs (meds, wound care, etc)   Refer to discharge planning if patient needs post-hospital services based on physician order or complex needs related to functional status, cognitive ability or social support system  Note: Pt not appropriate for discharge at this time, will continue to monitor and reassess.     4/5/2024 1427 by Sayda Babin RN  Outcome: Progressing     Problem: Pain  Goal: Verbalizes/displays adequate comfort level or baseline comfort level  4/5/2024 2317 by Azra Ochoa RN  Outcome: Progressing  Flowsheets (Taken 4/1/2024 1712 by Tere Taylor RN)  Verbalizes/displays adequate comfort level or baseline comfort level:   Encourage patient to monitor pain and request assistance   Assess pain using appropriate pain scale   Administer analgesics based on type and severity of pain and evaluate response   Implement non-pharmacological measures as appropriate and evaluate response   Consider cultural and social influences on pain and pain management   Notify Licensed Independent Practitioner if interventions unsuccessful or patient reports new pain  Note: Pt has no complaints of pain at this time. Pain medication management provided. Will continue to monitor and reassess.   4/5/2024 1427 by Sayda Babin RN  Outcome: Not Progressing     Problem: ABCDS Injury Assessment  Goal: Absence of physical injury  4/5/2024 2317 by Azra Ochoa RN  Outcome: Progressing  Flowsheets (Taken 4/1/2024 1712 by Tere Taylor RN)  Absence of Physical Injury: Implement  RN  Outcome: Progressing  Note: Skin integrity remains CDI, pt turned every 2 hours, heels elevated off bed. Will continue to complete skin assessments.    4/5/2024 1427 by Sayda Babin RN  Outcome: Progressing     Problem: Confusion  Goal: Confusion, delirium, dementia, or psychosis is improved or at baseline  Description: INTERVENTIONS:  1. Assess for possible contributors to thought disturbance, including medications, impaired vision or hearing, underlying metabolic abnormalities, dehydration, psychiatric diagnoses, and notify attending LIP  2. [place] high risk fall precautions, as indicated  3. Provide frequent short contacts to provide reality reorientation, refocusing and direction  4. Decrease environmental stimuli, including noise as appropriate  5. Monitor and intervene to maintain adequate nutrition, hydration, elimination, sleep and activity  6. If unable to ensure safety without constant attention obtain sitter and review sitter guidelines with assigned personnel  7. Initiate Psychosocial CNS and Spiritual Care consult, as indicated  4/5/2024 1427 by Sayda Babin RN  Outcome: Progressing     Problem: Musculoskeletal - Adult  Goal: Return mobility to safest level of function  4/5/2024 2317 by Azra Ochoa, RN  Outcome: Progressing  Flowsheets (Taken 4/3/2024 2015 by Gaye Ramirez, RN)  Return Mobility to Safest Level of Function:   Assess patient stability and activity tolerance for standing, transferring and ambulating with or without assistive devices   Assist with transfers and ambulation using safe patient handling equipment as needed   Ensure adequate protection for wounds/incisions during mobilization   Instruct patient/family in ordered activity level  4/5/2024 1427 by Sayda Babin RN  Outcome: Progressing  Goal: Return ADL status to a safe level of function  4/5/2024 1427 by Sayda Babin RN  Outcome: Progressing     Problem: Gastrointestinal - Adult  Goal: Maintains or returns to  Healing Without Sign and Symptoms of Infection:   ADMISSION and DAILY: Assess and document risk factors for pressure ulcer development   TWICE DAILY: Assess and document skin integrity   TWICE DAILY: Assess and document dressing/incision, wound bed, drain sites and surrounding tissue   Implement wound care per orders   Initiate isolation precautions as appropriate   Initiate pressure ulcer prevention bundle as indicated  Note: Infection present, drain present.      Problem: Pain  Goal: Verbalizes/displays adequate comfort level or baseline comfort level  4/5/2024 2317 by Azra Ochoa RN  Outcome: Progressing  Flowsheets (Taken 4/1/2024 1712 by Tere Taylor RN)  Verbalizes/displays adequate comfort level or baseline comfort level:   Encourage patient to monitor pain and request assistance   Assess pain using appropriate pain scale   Administer analgesics based on type and severity of pain and evaluate response   Implement non-pharmacological measures as appropriate and evaluate response   Consider cultural and social influences on pain and pain management   Notify Licensed Independent Practitioner if interventions unsuccessful or patient reports new pain  Note: Pt has no complaints of pain at this time. Pain medication management provided. Will continue to monitor and reassess.   4/5/2024 1427 by Sayda Babin, RN  Outcome: Not Progressing

## 2024-04-07 LAB
BACTERIA CSF CULT: ABNORMAL
BACTERIA CSF CULT: ABNORMAL
BACTERIA SPEC ANAEROBE CULT: ABNORMAL
BACTERIA SPEC ANAEROBE CULT: ABNORMAL
GRAM STN SPEC: ABNORMAL
GRAM STN SPEC: ABNORMAL
INDIA INK PREP CSF: ABNORMAL
ORGANISM: ABNORMAL
ORGANISM: ABNORMAL

## 2024-04-07 PROCEDURE — 2100000000 HC CCU R&B

## 2024-04-07 PROCEDURE — 6370000000 HC RX 637 (ALT 250 FOR IP): Performed by: PHYSICIAN ASSISTANT

## 2024-04-07 PROCEDURE — 6370000000 HC RX 637 (ALT 250 FOR IP): Performed by: STUDENT IN AN ORGANIZED HEALTH CARE EDUCATION/TRAINING PROGRAM

## 2024-04-07 PROCEDURE — 2580000003 HC RX 258: Performed by: PHYSICIAN ASSISTANT

## 2024-04-07 PROCEDURE — 6370000000 HC RX 637 (ALT 250 FOR IP): Performed by: NURSE PRACTITIONER

## 2024-04-07 PROCEDURE — 2500000003 HC RX 250 WO HCPCS

## 2024-04-07 PROCEDURE — APPSS60 APP SPLIT SHARED TIME 46-60 MINUTES: Performed by: PHYSICIAN ASSISTANT

## 2024-04-07 PROCEDURE — 2580000003 HC RX 258

## 2024-04-07 PROCEDURE — 2580000003 HC RX 258: Performed by: INTERNAL MEDICINE

## 2024-04-07 PROCEDURE — 99233 SBSQ HOSP IP/OBS HIGH 50: CPT | Performed by: INTERNAL MEDICINE

## 2024-04-07 PROCEDURE — 6360000002 HC RX W HCPCS: Performed by: STUDENT IN AN ORGANIZED HEALTH CARE EDUCATION/TRAINING PROGRAM

## 2024-04-07 PROCEDURE — 6360000002 HC RX W HCPCS: Performed by: INTERNAL MEDICINE

## 2024-04-07 PROCEDURE — 6360000002 HC RX W HCPCS

## 2024-04-07 RX ORDER — OXYCODONE HYDROCHLORIDE 5 MG/1
5 TABLET ORAL EVERY 4 HOURS PRN
Status: DISCONTINUED | OUTPATIENT
Start: 2024-04-07 | End: 2024-04-19 | Stop reason: HOSPADM

## 2024-04-07 RX ORDER — ZOLPIDEM TARTRATE 5 MG/1
5 TABLET ORAL NIGHTLY PRN
Status: DISCONTINUED | OUTPATIENT
Start: 2024-04-07 | End: 2024-04-10

## 2024-04-07 RX ADMIN — HYDROMORPHONE HYDROCHLORIDE 0.5 MG: 1 INJECTION, SOLUTION INTRAMUSCULAR; INTRAVENOUS; SUBCUTANEOUS at 07:34

## 2024-04-07 RX ADMIN — CYCLOBENZAPRINE 10 MG: 10 TABLET, FILM COATED ORAL at 05:32

## 2024-04-07 RX ADMIN — Medication 0.8 MCG/KG/HR: at 21:17

## 2024-04-07 RX ADMIN — AMLODIPINE BESYLATE 5 MG: 5 TABLET ORAL at 10:34

## 2024-04-07 RX ADMIN — SODIUM CHLORIDE, PRESERVATIVE FREE 10 ML: 5 INJECTION INTRAVENOUS at 21:25

## 2024-04-07 RX ADMIN — SODIUM CHLORIDE, PRESERVATIVE FREE 10 ML: 5 INJECTION INTRAVENOUS at 07:35

## 2024-04-07 RX ADMIN — OXYCODONE 5 MG: 5 TABLET ORAL at 22:13

## 2024-04-07 RX ADMIN — OXYCODONE 5 MG: 5 TABLET ORAL at 18:10

## 2024-04-07 RX ADMIN — Medication 0.8 MCG/KG/HR: at 07:38

## 2024-04-07 RX ADMIN — CEFTRIAXONE SODIUM 2000 MG: 2 INJECTION, POWDER, FOR SOLUTION INTRAMUSCULAR; INTRAVENOUS at 05:25

## 2024-04-07 RX ADMIN — HYDROMORPHONE HYDROCHLORIDE 0.5 MG: 1 INJECTION, SOLUTION INTRAMUSCULAR; INTRAVENOUS; SUBCUTANEOUS at 18:10

## 2024-04-07 RX ADMIN — DULOXETINE HYDROCHLORIDE 20 MG: 20 CAPSULE, DELAYED RELEASE ORAL at 21:20

## 2024-04-07 RX ADMIN — CEFTRIAXONE SODIUM 2000 MG: 2 INJECTION, POWDER, FOR SOLUTION INTRAMUSCULAR; INTRAVENOUS at 17:22

## 2024-04-07 RX ADMIN — SENNOSIDES 17.2 MG: 8.6 TABLET ORAL at 21:22

## 2024-04-07 RX ADMIN — GENTAMICIN: 10 INJECTION, SOLUTION INTRAMUSCULAR; INTRAVENOUS at 11:42

## 2024-04-07 RX ADMIN — OXYCODONE 5 MG: 5 TABLET ORAL at 07:35

## 2024-04-07 RX ADMIN — HYDROMORPHONE HYDROCHLORIDE 0.5 MG: 1 INJECTION, SOLUTION INTRAMUSCULAR; INTRAVENOUS; SUBCUTANEOUS at 14:07

## 2024-04-07 RX ADMIN — FAMOTIDINE 20 MG: 20 TABLET, FILM COATED ORAL at 10:34

## 2024-04-07 RX ADMIN — CYCLOBENZAPRINE 10 MG: 10 TABLET, FILM COATED ORAL at 21:20

## 2024-04-07 RX ADMIN — ROPINIROLE HYDROCHLORIDE 1 MG: 1 TABLET, FILM COATED ORAL at 21:20

## 2024-04-07 RX ADMIN — OXYCODONE 5 MG: 5 TABLET ORAL at 14:07

## 2024-04-07 RX ADMIN — HYDROMORPHONE HYDROCHLORIDE 0.5 MG: 1 INJECTION, SOLUTION INTRAMUSCULAR; INTRAVENOUS; SUBCUTANEOUS at 04:09

## 2024-04-07 RX ADMIN — HYDROMORPHONE HYDROCHLORIDE 0.5 MG: 1 INJECTION, SOLUTION INTRAMUSCULAR; INTRAVENOUS; SUBCUTANEOUS at 21:10

## 2024-04-07 ASSESSMENT — PAIN DESCRIPTION - LOCATION
LOCATION: BACK
LOCATION: BACK;LEG
LOCATION: BACK

## 2024-04-07 ASSESSMENT — PAIN SCALES - GENERAL
PAINLEVEL_OUTOF10: 7
PAINLEVEL_OUTOF10: 10
PAINLEVEL_OUTOF10: 8
PAINLEVEL_OUTOF10: 8
PAINLEVEL_OUTOF10: 10

## 2024-04-07 ASSESSMENT — PAIN DESCRIPTION - DESCRIPTORS
DESCRIPTORS: DISCOMFORT;NAGGING
DESCRIPTORS: STABBING;SHARP
DESCRIPTORS: STABBING
DESCRIPTORS: DISCOMFORT;STABBING
DESCRIPTORS: STABBING;THROBBING
DESCRIPTORS: THROBBING

## 2024-04-07 ASSESSMENT — PAIN DESCRIPTION - ORIENTATION
ORIENTATION: LEFT;MID;LOWER
ORIENTATION: MID;LOWER
ORIENTATION: MID;LOWER
ORIENTATION: LOWER;MID
ORIENTATION: MID;LOWER
ORIENTATION: LOWER;MID
ORIENTATION: LOWER;MID

## 2024-04-07 NOTE — PROGRESS NOTES
Neurosurgery Progress Note    Patient:  Alpa Marte      Unit/Bed:-05/005-A    YOB: 1955    MRN: [id number]     Acct: [id number]     Admit date: 3/26/2024    No chief complaint on file.      Patient Seen, Chart, Physician notes, Labs, Radiology studies reviewed.    Subjective: Patient is seen and evaluated on 4B with evaluation and exam findings reviewed and discussed with Dr. Caldera/neurosurgeon, with infectious disease and with nursing.  Patient was lying in bed on her right side and was comfortable this morning with pain well to moderately controlled.        Past, Family, Social History unchanged from admission.    Diet:  ADULT ORAL NUTRITION SUPPLEMENT; Breakfast, Lunch, Dinner; Other Oral Supplement; Activia and hot beverage  ADULT DIET; Regular  ADULT ORAL NUTRITION SUPPLEMENT; Breakfast, Lunch, Dinner; Standard High Calorie/High Protein Oral Supplement    Medications:  Scheduled Meds:   [Held by provider] gentamicin (PF) 6 mg in Sodium chloride 0.9% 5 ml syringe-INTRATHECAL   Other Daily    cefTRIAXone (ROCEPHIN) IV  2,000 mg IntraVENous Q12H    DULoxetine  20 mg Oral Nightly    rOPINIRole  1 mg Oral Nightly    lidocaine  3 patch TransDERmal Daily    senna  2 tablet Oral Nightly    famotidine  20 mg Oral Daily    amLODIPine  5 mg Oral Daily    [Held by provider] gabapentin  600 mg Oral QAM    And    [Held by provider] gabapentin  900 mg Oral Nightly    sodium chloride flush  5-40 mL IntraVENous 2 times per day     Continuous Infusions:   dexmedeTOMIDine 0.8 mcg/kg/hr (04/07/24 0738)    sodium chloride 10 mL/hr at 04/06/24 1425     PRN Meds:HYDROmorphone **OR** HYDROmorphone, oxyCODONE, zolpidem, polyethylene glycol, acetaminophen, naloxone, [Held by provider] HYDROcodone 5 mg - acetaminophen **OR** [Held by provider] HYDROcodone 5 mg - acetaminophen, hydrALAZINE, ondansetron, labetalol, sodium chloride flush, sodium chloride, cyclobenzaprine    Objective: Patient was observed lying in bed  on her right side with pain well to moderately controlled.  Lumbar drain was intact but was not functioning properly due to multiple kinks increases in the surgical tubing.  With the assistance of Dr. Melendez/infectious disease, gentamicin was introduced intrathecally through the drain at approximately 11:42 AM this morning.  The drain was then clamped with nursing instructed to reopen the drain to establish flow to the collection device in 2 hours.  Recently changed dressings were intact at the time of this procedure with a small amount of discharge staining the area of the dressing around the lumbar drain.        Vitals: /70   Pulse 98   Temp 98.1 °F (36.7 °C) (Oral)   Resp 26   Ht 1.651 m (5' 5\")   Wt 47.7 kg (105 lb 2.6 oz)   SpO2 98%   BMI 17.50 kg/m²     Physical Exam     Physical Exam:  Confused with decreased concentration  Language appropriate, with no aphasia.  Pupils equal.  Facial strength symmetric.    Review of Systems   A comprehensive review of systems is unobtainable due to patient's status  24 hour intake/output:  Intake/Output Summary (Last 24 hours) at 4/7/2024 1334  Last data filed at 4/7/2024 1142  Gross per 24 hour   Intake 1440.99 ml   Output 938 ml   Net 502.99 ml     Last 3 weights:  Wt Readings from Last 3 Encounters:   04/05/24 47.7 kg (105 lb 2.6 oz)   03/11/24 48.6 kg (107 lb 2.3 oz)   01/15/24 48.6 kg (107 lb 3.2 oz)         CBC:   Recent Labs     04/05/24  0859 04/06/24  0359   WBC 13.1* 22.3*   HGB 13.0 10.4*    229     BMP:    Recent Labs     04/05/24  0859 04/06/24  0359   * 133*   K 4.3 3.9   CL 97* 99   CO2 23 22*   BUN 26* 25*   CREATININE 0.5 0.4   GLUCOSE 109* 97     Calcium:  Recent Labs     04/06/24  0359   CALCIUM 7.7*     Magnesium:  Recent Labs     04/05/24  0402   MG 2.0     Glucose:No results for input(s): \"POCGLU\" in the last 72 hours.  HgbA1C: No results for input(s): \"LABA1C\" in the last 72 hours.  Lipids: No results for input(s): \"CHOL\",

## 2024-04-07 NOTE — PROGRESS NOTES
CRITICAL CARE MEDICINE Progress notes   Patient:  Alpa Marte    Unit/Bed:4B-05/005-A  MRN: [id number]   PCP: Connie Augustin PA  Date of Admission: 3/26/2024    Assessment and Plan(All pulmonary edema, renal failure, PE, and respiratory failure diagnoses are acute in nature unless otherwise specified):        E. coli meningitis and bacteremia:  continue IV Rocephin every 12 hours for 21 days total, started 4/2/2024.  Will need PICC line once blood cultures are negative.  Infectious disease following.  Receiving gentamicin intrathecally for 3 days to help clean the CSF - on hold due to excessive leaking around the drain. Repeat blood cultures pending.  Recent lumbar fusion L3-sacrum complicated with dural leak: Wound VAC removed 3/29. Neurosurgery considering removal of lumbar drain after 3 days of gentamicin. Goal is to maintain lumbar drain at 15 cc/hr however patient did not have any drainage over night - neurosurg to evaluate this am.  Left cerebellar hemorrhage: On 3/31, CT head revealed 12 x 7 mm area of acute hemorrhage left cerebellar hemisphere, with bilateral tentorium cerebri hemorrhage noted on MRI. Neurosurgery following. Stable on repeat CT head 4/1.  Encephalopathy: Likely secondary to meningitis, delirium and side effect of gentamicin. Patient's BP dropped after receiving trazodone last night.  Can consider using Seroquel.  Consider holding third dose of gentamicin.  Primary HTN: Continue norvasc.  Restless leg syndrome: Continue Requip.  Depression: Continue Cymbalta.  SANTY: Resolved.  HAGMA: Resolved.     CC:  lumbar fusion with dural leak   HPI: Per admitting HPI \"Alpa Marte is a 68 y.o. female with past medical history of chronic lumbar back pain, restless leg syndrome, urinary incontinence, tobacco abuse, regular alcohol use, and arthritis who came in to [facility] for her lumbar surgical intervention with neurosurgery, Dr. Caldera. Chart review reveals that patient had surgical history  for medical management care.  On ICU arrival, patient is awake, alert, oriented x 4, and able to follow commands, no family member at bedside. VS stable, no fever, and respiratory status stable on 2 L oxygen support via nasal cannula. No acute labored breathing noted on examination. Patient denied pain and discomfort. Patient confirmed full code status at this time, primary nurse at bedside.\"  ROS: Review of Systems   Constitutional:  Negative for chills and fever.   Respiratory:  Negative for cough, shortness of breath and wheezing.    Cardiovascular:  Negative for chest pain and leg swelling.   Gastrointestinal:  Negative for abdominal pain, nausea and vomiting.   Musculoskeletal:  Positive for back pain.   Neurological:  Negative for dizziness and headaches.   Psychiatric/Behavioral:  Positive for agitation and confusion.      PMH:  Per HPI  SHX: Daily smoker 0.5 PPD  FHX: Significant for hypertension, diabetes, cancer  Allergies: NKA  Medications:     dexmedeTOMIDine 0.8 mcg/kg/hr (04/07/24 0738)    sodium chloride 10 mL/hr at 04/06/24 1425      traZODone  50 mg Oral Nightly    [Held by provider] gentamicin (PF) 6 mg in Sodium chloride 0.9% 5 ml syringe-INTRATHECAL   Other Daily    cefTRIAXone (ROCEPHIN) IV  2,000 mg IntraVENous Q12H    DULoxetine  20 mg Oral Nightly    rOPINIRole  1 mg Oral Nightly    lidocaine  3 patch TransDERmal Daily    senna  2 tablet Oral Nightly    famotidine  20 mg Oral Daily    amLODIPine  5 mg Oral Daily    [Held by provider] gabapentin  600 mg Oral QAM    And    [Held by provider] gabapentin  900 mg Oral Nightly    sodium chloride flush  5-40 mL IntraVENous 2 times per day       Vital Signs:   T: 98.2 P: 101 RR: 25 B/P: 127/71 O2 Sat: 98% on room air I/O: Net -500 cc GCS: 14  Body mass index is 17.5 kg/m²..  General:  Acutely ill-appearing female, encephalopathic, writhing in pain  HEENT:  normocephalic and atraumatic. No scleral icterus. PERR  Neck: supple.  No Thyromegaly.  Lungs:  clear to auscultation.  No retractions  Cardiac: RRR.  No JVD.  Abdomen: soft.  Nontender.  Extremities:  No clubbing, cyanosis, or edema x 4.    Vasculature: capillary refill < 3 seconds. Palpable dorsalis pedis pulses.  Skin:  warm and dry.  Psych:  Alert.  Confused.  Lymph:  No supraclavicular adenopathy.  Neurologic:  No focal deficit. No seizures.    Data: (All radiographs, tracings, PFTs, and imaging are personally viewed and interpreted unless otherwise noted).   Sodium 133, potassium 3.9, creatinine 0.4, Albumin 2.7, total protein 5.2, WBC 23, Hgb 10.4 - labs from 4/6, refused labs on 4/7 due to confusion and pain  Repeat CSF culture on 4/4 was positive for E. coli.  Repeat blood cultures no growth at 24 hours    Electronically signed by Gato Caballero DO on 4/7/2024 at 7:47 AM  Case and plan developed in coordination with Dr. Rivera          Addendum by Dr. Miguel MD:  Patient seen by me independently including key components of medical care. Face to face evaluation and examination was performed. Case discussed with Dr. Gato Caballero DO-resident physician. Agree with resident's findings and plan as documented in the resident's note. Italicized font, if present,  represents changes to the note made by me.   More than 50% of the encounter time involved with patient care by the Pulmonary & Critical care service team spent by me.    Please see my modifications mentioned below:  Patient is in no acute distress.  Still found to be confused.  On one-to-one observation with a bedside sitter.    No results found for: \"PH\", \"PCO2\", \"PO2\", \"HCO3\", \"O2SAT\"  No results found for: \"IFIO2\", \"MODE\", \"SETTIDVOL\", \"SETPEEP\"    CBC:   Recent Labs     04/05/24  0859 04/06/24  0359   WBC 13.1* 22.3*   HGB 13.0 10.4*   HCT 36.8* 30.0*    229     BMP:  Recent Labs     04/05/24  0402 04/05/24  0859 04/06/24  0359   NA  --  134* 133*   K  --  4.3 3.9   CL  --  97* 99   CO2  --  23 22*   BUN  --  26* 25*   CREATININE  --

## 2024-04-07 NOTE — PROGRESS NOTES
Progress note: Infectious diseases    Patient - Alpa Marte,  Age - 68 y.o.    - 1955      Room Number - 4B-05/005-A   MRN -  [id number]   [facility] # - [id number]  Date of Admission -  3/26/2024  7:58 AM     Subjective:  Confused  Still active drainage    OBJECTIVE   VITALS    height is 1.651 m (5' 5\") and weight is 47.7 kg (105 lb 2.6 oz). Her oral temperature is 98.2 °F (36.8 °C). Her blood pressure is 129/97 (abnormal) and her pulse is 91. Her respiration is 40 (abnormal) and oxygen saturation is 94%.       Wt Readings from Last 3 Encounters:   24 47.7 kg (105 lb 2.6 oz)   24 48.6 kg (107 lb 2.3 oz)   01/15/24 48.6 kg (107 lb 3.2 oz)       I/O (24 Hours)    Intake/Output Summary (Last 24 hours) at 2024 1143  Last data filed at 2024 1030  Gross per 24 hour   Intake 1675.99 ml   Output 1060.4 ml   Net 615.59 ml         General Appearance  Awake,but confused  HEENT - normocephalic, atraumatic, pink conjunctiva,  anicteric sclera dry oral mucosa  Neck - Supple, no mass  Lungs -  Bilateral   air entry, no rhonchi, no wheeze  Cardiovascular - Heart sounds are normal.     Abdomen - soft, not distended, nontender,   Neurologic -alert and awake,    Skin - No bruising or bleeding  Extremities - No edema, no cyanosis, clubbing   Dressed lumbar wound. Drain in place. Still active drainage    MEDICATIONS:      [Held by provider] gentamicin (PF) 6 mg in Sodium chloride 0.9% 5 ml syringe-INTRATHECAL   Other Daily    cefTRIAXone (ROCEPHIN) IV  2,000 mg IntraVENous Q12H    DULoxetine  20 mg Oral Nightly    rOPINIRole  1 mg Oral Nightly    lidocaine  3 patch TransDERmal Daily    senna  2 tablet Oral Nightly    famotidine  20 mg Oral Daily    amLODIPine  5 mg Oral Daily    [Held by provider] gabapentin  600 mg Oral QAM    And    [Held by provider] gabapentin  900 mg Oral Nightly    sodium chloride flush  5-40 mL  IntraVENous 2 times per day      dexmedeTOMIDine 0.8 mcg/kg/hr (04/07/24 0738)    sodium chloride 10 mL/hr at 04/06/24 1425     HYDROmorphone **OR** HYDROmorphone, oxyCODONE, zolpidem, polyethylene glycol, acetaminophen, naloxone, [Held by provider] HYDROcodone 5 mg - acetaminophen **OR** [Held by provider] HYDROcodone 5 mg - acetaminophen, hydrALAZINE, ondansetron, labetalol, sodium chloride flush, sodium chloride, cyclobenzaprine      LABS:     CBC:   Recent Labs     04/05/24  0859 04/06/24  0359   WBC 13.1* 22.3*   HGB 13.0 10.4*    229       BMP:    Recent Labs     04/05/24  0859 04/06/24  0359   * 133*   K 4.3 3.9   CL 97* 99   CO2 23 22*   BUN 26* 25*   CREATININE 0.5 0.4   GLUCOSE 109* 97       Calcium:  Recent Labs     04/06/24  0359   CALCIUM 7.7*       Ionized Calcium:No results for input(s): \"IONCA\" in the last 72 hours.  Magnesium:  Recent Labs     04/05/24  0402   MG 2.0       Phosphorus:No results for input(s): \"PHOS\" in the last 72 hours.  BNP:No results for input(s): \"BNP\" in the last 72 hours.  Glucose:  No results for input(s): \"POCGLU\" in the last 72 hours.    HgbA1C: No results for input(s): \"LABA1C\" in the last 72 hours.  INR: No results for input(s): \"INR\" in the last 72 hours.  Hepatic:   Recent Labs     04/06/24  0359   ALKPHOS 63   ALT 32   AST 21   PROT 5.2*   BILITOT 0.5   BILIDIR <0.2   LABALBU 2.7*       Amylase and Lipase:  Recent Labs     04/06/24  0359   LACTA 0.9       Lactic Acid:   Recent Labs     04/06/24  0359   LACTA 0.9       Troponin: No results for input(s): \"CKTOTAL\", \"CKMB\", \"TROPONINI\" in the last 72 hours.  BNP: No results for input(s): \"BNP\" in the last 72 hours.    CULTURES:   UA:   No results for input(s): \"SPECGRAV\", \"PHUR\", \"COLORU\", \"CLARITYU\", \"MUCUS\", \"PROTEINU\", \"BLOODU\", \"RBCUA\", \"WBCUA\", \"BACTERIA\", \"NITRU\", \"GLUCOSEU\", \"BILIRUBINUR\", \"UROBILINOGEN\", \"KETUA\", \"LABCAST\", \"LABCASTTY\", \"AMORPHOS\" in the last 72 hours.    Invalid input(s):

## 2024-04-07 NOTE — PLAN OF CARE
Problem: Pain  Goal: Verbalizes/displays adequate comfort level or baseline comfort level  Outcome: Progressing  Flowsheets  Taken 4/7/2024 1600 by Tevin Hernandez RN  Verbalizes/displays adequate comfort level or baseline comfort level:   Encourage patient to monitor pain and request assistance   Assess pain using appropriate pain scale   Administer analgesics based on type and severity of pain and evaluate response   Implement non-pharmacological measures as appropriate and evaluate response   Notify Licensed Independent Practitioner if interventions unsuccessful or patient reports new pain   Consider cultural and social influences on pain and pain management  Taken 4/7/2024 1200 by Tevin Hernandez RN  Verbalizes/displays adequate comfort level or baseline comfort level:   Encourage patient to monitor pain and request assistance   Assess pain using appropriate pain scale   Administer analgesics based on type and severity of pain and evaluate response   Implement non-pharmacological measures as appropriate and evaluate response   Consider cultural and social influences on pain and pain management   Notify Licensed Independent Practitioner if interventions unsuccessful or patient reports new pain  Taken 4/7/2024 1000 by Tevin Hernandez RN  Verbalizes/displays adequate comfort level or baseline comfort level:   Encourage patient to monitor pain and request assistance   Assess pain using appropriate pain scale   Administer analgesics based on type and severity of pain and evaluate response   Implement non-pharmacological measures as appropriate and evaluate response   Consider cultural and social influences on pain and pain management   Notify Licensed Independent Practitioner if interventions unsuccessful or patient reports new pain  Taken 4/7/2024 0800 by Tevin Hernandez RN  Verbalizes/displays adequate comfort level or baseline comfort level:   Encourage patient to monitor pain and request assistance   Assess  Administer IV fluids as ordered to ensure adequate hydration   Administer ordered medications as needed   Encourage mobilization and activity   Nutrition consult to assist patient with appropriate food choices     Problem: Neurosensory - Adult  Goal: Achieves stable or improved neurological status  Outcome: Progressing  Flowsheets (Taken 4/7/2024 0800 by Tevin Hernandez, RN)  Achieves stable or improved neurological status:   Assess for and report changes in neurological status   Initiate measures to prevent increased intracranial pressure   Maintain blood pressure and fluid volume within ordered parameters to optimize cerebral perfusion and minimize risk of hemorrhage   Monitor temperature, glucose, and sodium. Initiate appropriate interventions as ordered  Goal: Achieves maximal functionality and self care  Outcome: Progressing  Flowsheets (Taken 4/7/2024 0800 by Tevin Hernandez, RN)  Achieves maximal functionality and self care:   Monitor swallowing and airway patency with patient fatigue and changes in neurological status   Encourage and assist patient to increase activity and self care with guidance from physical therapy/occupational therapy   Encourage visually impaired, hearing impaired and aphasic patients to use assistive/communication devices     Problem: Skin/Tissue Integrity - Adult  Goal: Incisions, wounds, or drain sites healing without S/S of infection  Outcome: Progressing  Flowsheets (Taken 4/7/2024 0800 by Tevin Hernandez, RN)  Incisions, Wounds, or Drain Sites Healing Without Sign and Symptoms of Infection:   TWICE DAILY: Assess and document skin integrity   TWICE DAILY: Assess and document dressing/incision, wound bed, drain sites and surrounding tissue   Implement wound care per orders   Initiate isolation precautions as appropriate   Initiate pressure ulcer prevention bundle as indicated

## 2024-04-07 NOTE — FLOWSHEET NOTE
04/07/24 0032   Treatment Team Notification   Reason for Communication Evaluate  (pt lumbar drain not having output through liquiguard but saturating dressings.)   Name of Team Member Notified Dr. Mancilla   Treatment Team Role Resident   Method of Communication Face to face   Response No new orders   Notification Time 0032     This RN relayed to Dr. Mancilla that pt lumbar drain was having decreased output to 0ml per hour for more than one hour in a row.  Pt draining around drain insertion site, dressing on drain saturated and changed x2.

## 2024-04-08 ENCOUNTER — APPOINTMENT (OUTPATIENT)
Dept: GENERAL RADIOLOGY | Age: 69
End: 2024-04-08
Attending: NEUROLOGICAL SURGERY
Payer: MEDICARE

## 2024-04-08 LAB
ANION GAP SERPL CALC-SCNC: 13 MEQ/L (ref 8–16)
ANION GAP SERPL CALC-SCNC: 13 MEQ/L (ref 8–16)
BASOPHILS ABSOLUTE: 0.1 THOU/MM3 (ref 0–0.1)
BASOPHILS NFR BLD AUTO: 0.3 %
BUN SERPL-MCNC: 16 MG/DL (ref 7–22)
BUN SERPL-MCNC: 18 MG/DL (ref 7–22)
CALCIUM SERPL-MCNC: 8.2 MG/DL (ref 8.5–10.5)
CALCIUM SERPL-MCNC: 8.4 MG/DL (ref 8.5–10.5)
CHLORIDE SERPL-SCNC: 93 MEQ/L (ref 98–111)
CHLORIDE SERPL-SCNC: 96 MEQ/L (ref 98–111)
CO2 SERPL-SCNC: 22 MEQ/L (ref 23–33)
CO2 SERPL-SCNC: 23 MEQ/L (ref 23–33)
CREAT SERPL-MCNC: 0.4 MG/DL (ref 0.4–1.2)
CREAT SERPL-MCNC: 0.5 MG/DL (ref 0.4–1.2)
DEPRECATED RDW RBC AUTO: 49.5 FL (ref 35–45)
EOSINOPHIL NFR BLD AUTO: 0.2 %
EOSINOPHILS ABSOLUTE: 0 THOU/MM3 (ref 0–0.4)
ERYTHROCYTE [DISTWIDTH] IN BLOOD BY AUTOMATED COUNT: 13 % (ref 11.5–14.5)
GFR SERPL CREATININE-BSD FRML MDRD: > 90 ML/MIN/1.73M2
GFR SERPL CREATININE-BSD FRML MDRD: > 90 ML/MIN/1.73M2
GLUCOSE SERPL-MCNC: 66 MG/DL (ref 70–108)
GLUCOSE SERPL-MCNC: 89 MG/DL (ref 70–108)
HCT VFR BLD AUTO: 37.6 % (ref 37–47)
HGB BLD-MCNC: 12.3 GM/DL (ref 12–16)
IMM GRANULOCYTES # BLD AUTO: 0.15 THOU/MM3 (ref 0–0.07)
IMM GRANULOCYTES NFR BLD AUTO: 0.7 %
LYMPHOCYTES ABSOLUTE: 1.6 THOU/MM3 (ref 1–4.8)
LYMPHOCYTES NFR BLD AUTO: 7 %
MAGNESIUM SERPL-MCNC: 1.8 MG/DL (ref 1.6–2.4)
MCH RBC QN AUTO: 33.9 PG (ref 26–33)
MCHC RBC AUTO-ENTMCNC: 32.7 GM/DL (ref 32.2–35.5)
MCV RBC AUTO: 103.6 FL (ref 81–99)
MONOCYTES ABSOLUTE: 2.3 THOU/MM3 (ref 0.4–1.3)
MONOCYTES NFR BLD AUTO: 10.4 %
NEUTROPHILS NFR BLD AUTO: 81.4 %
NRBC BLD AUTO-RTO: 0 /100 WBC
OSMOLALITY UR: 744 MOSMOL/KG (ref 250–750)
PLATELET # BLD AUTO: 244 THOU/MM3 (ref 130–400)
PLATELET BLD QL SMEAR: ADEQUATE
PMV BLD AUTO: 11.4 FL (ref 9.4–12.4)
POTASSIUM SERPL-SCNC: 3.5 MEQ/L (ref 3.5–5.2)
POTASSIUM SERPL-SCNC: 4 MEQ/L (ref 3.5–5.2)
RBC # BLD AUTO: 3.63 MILL/MM3 (ref 4.2–5.4)
SCAN OF BLOOD SMEAR: NORMAL
SEGMENTED NEUTROPHILS ABSOLUTE COUNT: 18.3 THOU/MM3 (ref 1.8–7.7)
SODIUM SERPL-SCNC: 128 MEQ/L (ref 135–145)
SODIUM SERPL-SCNC: 132 MEQ/L (ref 135–145)
SODIUM UR-SCNC: 105 MEQ/L
WBC # BLD AUTO: 22.5 THOU/MM3 (ref 4.8–10.8)

## 2024-04-08 PROCEDURE — APPSS60 APP SPLIT SHARED TIME 46-60 MINUTES

## 2024-04-08 PROCEDURE — 85025 COMPLETE CBC W/AUTO DIFF WBC: CPT

## 2024-04-08 PROCEDURE — 87075 CULTR BACTERIA EXCEPT BLOOD: CPT

## 2024-04-08 PROCEDURE — 36415 COLL VENOUS BLD VENIPUNCTURE: CPT

## 2024-04-08 PROCEDURE — 6370000000 HC RX 637 (ALT 250 FOR IP): Performed by: STUDENT IN AN ORGANIZED HEALTH CARE EDUCATION/TRAINING PROGRAM

## 2024-04-08 PROCEDURE — 99291 CRITICAL CARE FIRST HOUR: CPT | Performed by: INTERNAL MEDICINE

## 2024-04-08 PROCEDURE — 6360000002 HC RX W HCPCS

## 2024-04-08 PROCEDURE — 6360000002 HC RX W HCPCS: Performed by: STUDENT IN AN ORGANIZED HEALTH CARE EDUCATION/TRAINING PROGRAM

## 2024-04-08 PROCEDURE — 87070 CULTURE OTHR SPECIMN AEROBIC: CPT

## 2024-04-08 PROCEDURE — 6370000000 HC RX 637 (ALT 250 FOR IP): Performed by: PHYSICIAN ASSISTANT

## 2024-04-08 PROCEDURE — 83935 ASSAY OF URINE OSMOLALITY: CPT

## 2024-04-08 PROCEDURE — 80048 BASIC METABOLIC PNL TOTAL CA: CPT

## 2024-04-08 PROCEDURE — 2100000000 HC CCU R&B

## 2024-04-08 PROCEDURE — 84300 ASSAY OF URINE SODIUM: CPT

## 2024-04-08 PROCEDURE — 2580000003 HC RX 258: Performed by: PHYSICIAN ASSISTANT

## 2024-04-08 PROCEDURE — 2580000003 HC RX 258

## 2024-04-08 PROCEDURE — 6370000000 HC RX 637 (ALT 250 FOR IP): Performed by: NURSE PRACTITIONER

## 2024-04-08 PROCEDURE — 87205 SMEAR GRAM STAIN: CPT

## 2024-04-08 PROCEDURE — 6370000000 HC RX 637 (ALT 250 FOR IP)

## 2024-04-08 PROCEDURE — 83735 ASSAY OF MAGNESIUM: CPT

## 2024-04-08 PROCEDURE — 97530 THERAPEUTIC ACTIVITIES: CPT

## 2024-04-08 PROCEDURE — 74018 RADEX ABDOMEN 1 VIEW: CPT

## 2024-04-08 PROCEDURE — 87040 BLOOD CULTURE FOR BACTERIA: CPT

## 2024-04-08 PROCEDURE — 2500000003 HC RX 250 WO HCPCS

## 2024-04-08 RX ORDER — SODIUM CHLORIDE 9 MG/ML
INJECTION, SOLUTION INTRAVENOUS CONTINUOUS
Status: ACTIVE | OUTPATIENT
Start: 2024-04-08 | End: 2024-04-08

## 2024-04-08 RX ORDER — BUPIVACAINE HYDROCHLORIDE 5 MG/ML
30 INJECTION, SOLUTION PERINEURAL ONCE
Status: COMPLETED | OUTPATIENT
Start: 2024-04-08 | End: 2024-04-08

## 2024-04-08 RX ORDER — POTASSIUM CHLORIDE 20 MEQ/1
40 TABLET, EXTENDED RELEASE ORAL PRN
Status: DISCONTINUED | OUTPATIENT
Start: 2024-04-08 | End: 2024-04-19 | Stop reason: HOSPADM

## 2024-04-08 RX ORDER — POTASSIUM CHLORIDE 7.45 MG/ML
10 INJECTION INTRAVENOUS PRN
Status: DISCONTINUED | OUTPATIENT
Start: 2024-04-08 | End: 2024-04-19 | Stop reason: HOSPADM

## 2024-04-08 RX ORDER — POLYETHYLENE GLYCOL 3350 17 G/17G
17 POWDER, FOR SOLUTION ORAL DAILY
Status: DISCONTINUED | OUTPATIENT
Start: 2024-04-08 | End: 2024-04-10

## 2024-04-08 RX ADMIN — SODIUM CHLORIDE, PRESERVATIVE FREE 10 ML: 5 INJECTION INTRAVENOUS at 20:58

## 2024-04-08 RX ADMIN — HYDROMORPHONE HYDROCHLORIDE 1 MG: 1 INJECTION, SOLUTION INTRAMUSCULAR; INTRAVENOUS; SUBCUTANEOUS at 20:48

## 2024-04-08 RX ADMIN — HYDROMORPHONE HYDROCHLORIDE 0.75 MG: 1 INJECTION, SOLUTION INTRAMUSCULAR; INTRAVENOUS; SUBCUTANEOUS at 03:28

## 2024-04-08 RX ADMIN — HYDROMORPHONE HYDROCHLORIDE 0.5 MG: 1 INJECTION, SOLUTION INTRAMUSCULAR; INTRAVENOUS; SUBCUTANEOUS at 07:13

## 2024-04-08 RX ADMIN — POTASSIUM BICARBONATE 40 MEQ: 782 TABLET, EFFERVESCENT ORAL at 08:39

## 2024-04-08 RX ADMIN — HYDROMORPHONE HYDROCHLORIDE 0.75 MG: 1 INJECTION, SOLUTION INTRAMUSCULAR; INTRAVENOUS; SUBCUTANEOUS at 16:40

## 2024-04-08 RX ADMIN — CEFTRIAXONE SODIUM 2000 MG: 2 INJECTION, POWDER, FOR SOLUTION INTRAMUSCULAR; INTRAVENOUS at 04:35

## 2024-04-08 RX ADMIN — SODIUM CHLORIDE, PRESERVATIVE FREE 10 ML: 5 INJECTION INTRAVENOUS at 08:44

## 2024-04-08 RX ADMIN — ROPINIROLE HYDROCHLORIDE 1 MG: 1 TABLET, FILM COATED ORAL at 20:48

## 2024-04-08 RX ADMIN — OXYCODONE 5 MG: 5 TABLET ORAL at 13:44

## 2024-04-08 RX ADMIN — OXYCODONE 5 MG: 5 TABLET ORAL at 17:06

## 2024-04-08 RX ADMIN — Medication 1 MCG/KG/HR: at 06:57

## 2024-04-08 RX ADMIN — OXYCODONE 5 MG: 5 TABLET ORAL at 03:28

## 2024-04-08 RX ADMIN — SENNOSIDES 17.2 MG: 8.6 TABLET ORAL at 20:48

## 2024-04-08 RX ADMIN — Medication 1 MCG/KG/HR: at 17:08

## 2024-04-08 RX ADMIN — BUPIVACAINE HYDROCHLORIDE 25 MG: 5 INJECTION, SOLUTION EPIDURAL; INTRACAUDAL; PERINEURAL at 17:39

## 2024-04-08 RX ADMIN — DULOXETINE HYDROCHLORIDE 20 MG: 20 CAPSULE, DELAYED RELEASE ORAL at 20:48

## 2024-04-08 RX ADMIN — HYDROMORPHONE HYDROCHLORIDE 0.75 MG: 1 INJECTION, SOLUTION INTRAMUSCULAR; INTRAVENOUS; SUBCUTANEOUS at 00:13

## 2024-04-08 RX ADMIN — AMLODIPINE BESYLATE 5 MG: 5 TABLET ORAL at 08:44

## 2024-04-08 RX ADMIN — FAMOTIDINE 20 MG: 20 TABLET, FILM COATED ORAL at 08:39

## 2024-04-08 RX ADMIN — HYDROMORPHONE HYDROCHLORIDE 0.5 MG: 1 INJECTION, SOLUTION INTRAMUSCULAR; INTRAVENOUS; SUBCUTANEOUS at 11:53

## 2024-04-08 RX ADMIN — POLYETHYLENE GLYCOL 3350 17 G: 17 POWDER, FOR SOLUTION ORAL at 20:53

## 2024-04-08 RX ADMIN — CYCLOBENZAPRINE 10 MG: 10 TABLET, FILM COATED ORAL at 20:48

## 2024-04-08 RX ADMIN — GENTAMICIN: 10 INJECTION, SOLUTION INTRAMUSCULAR; INTRAVENOUS at 10:44

## 2024-04-08 RX ADMIN — OXYCODONE 5 MG: 5 TABLET ORAL at 10:14

## 2024-04-08 RX ADMIN — Medication 1.5 MCG/KG/HR: at 22:46

## 2024-04-08 RX ADMIN — CEFTRIAXONE SODIUM 2000 MG: 2 INJECTION, POWDER, FOR SOLUTION INTRAMUSCULAR; INTRAVENOUS at 16:43

## 2024-04-08 RX ADMIN — SODIUM CHLORIDE: 9 INJECTION, SOLUTION INTRAVENOUS at 12:44

## 2024-04-08 ASSESSMENT — PAIN DESCRIPTION - LOCATION
LOCATION: BACK

## 2024-04-08 ASSESSMENT — PAIN DESCRIPTION - DESCRIPTORS
DESCRIPTORS: STABBING;TIGHTNESS
DESCRIPTORS: THROBBING
DESCRIPTORS: STABBING;THROBBING
DESCRIPTORS: ACHING;DISCOMFORT;STABBING
DESCRIPTORS: STABBING;THROBBING
DESCRIPTORS: STABBING

## 2024-04-08 ASSESSMENT — PAIN SCALES - GENERAL
PAINLEVEL_OUTOF10: 9
PAINLEVEL_OUTOF10: 5
PAINLEVEL_OUTOF10: 9
PAINLEVEL_OUTOF10: 8
PAINLEVEL_OUTOF10: 5
PAINLEVEL_OUTOF10: 9
PAINLEVEL_OUTOF10: 8
PAINLEVEL_OUTOF10: 6
PAINLEVEL_OUTOF10: 4

## 2024-04-08 ASSESSMENT — PAIN DESCRIPTION - ORIENTATION
ORIENTATION: MID;LOWER
ORIENTATION: LOWER;MID
ORIENTATION: MID;LOWER
ORIENTATION: LOWER;MID

## 2024-04-08 NOTE — PROGRESS NOTES
unable to complete- unable to get her to sit up in bed and she was dependent to reposition hips and shoulders in bed however she scooted right back over to the rail on her left side - did position pillows between knees and under her head for optimal positioning   Exercise:  Patient was guided in 1 set(s) 10 reps of exercise to both lower extremities.  Ankle pumps and unable to complete further ex due to pt unable to tolerate laying in supine position  .  Exercises were completed for increased independence with functional mobility.    Functional Outcome Measures: Completed  AM-PAC Inpatient Mobility Raw Score : 9  AM-[facility] Inpatient T-Scale Score : 30.55  Modified Judy Scale:  +5 - Severe disability; bedridden, incontinent and requiring constant nursing care and attention   Patient could not live alone, could not walk from one room to another without physical help from another person, and can not sit up in bed without any help.  Education provided regarding stroke rehabilitation management.    ASSESSMENT:  Assessment:  Pt unable to tolerate much acitivity due to pain management. Unable to fully complete bed mobility w/o increased c/o of pain. Will have PT to reassess pts mobility next session to establish proper goals.   Activity Tolerance:  Patient tolerance of  treatment: poor. Due to increased pain and confussion      Equipment Recommendations:Other: monitor for needs  Discharge Recommendations: Subacte/Skilled Nursing Facility  Plan: Current Treatment Recommendations: Strengthening, Balance training, Functional mobility training, Gait training, Transfer training, ADL/Self-care training, Stair training, Home exercise program, Safety education & training, Patient/Caregiver education & training, Endurance training, Therapeutic activities  General Plan:  (5xO/N)    Education  Education:  Learners: Patient  Patient Education: Plan of Care    Goals:  Patient Goals : go home  Short Term Goals  Time Frame for Short Term  Goals: at discharge  Short Term Goal 1: Pt to be Mod I for supine <> sit to get in/out of bed  Short Term Goal 2: Pt to be Mod I for sit <> stand to get up to ambulate  Short Term Goal 3: Pt to ambulate >100 ft with RW with Supervision for household distances  Short Term Goal 4: Pt to negotiate 3-4 steps with 1 rail with Supervision for home access  Long Term Goals  Time Frame for Long Term Goals : not set due to short ELOS    Following session, patient left in safe position with all fall risk precautions in place.

## 2024-04-08 NOTE — PROGRESS NOTES
Progress note: Infectious diseases    Patient - Alpa Marte,  Age - 68 y.o.    - 1955      Room Number - 4B-05/005-A   MRN -  [id number]   [facility] # - [id number]  Date of Admission -  3/26/2024  7:58 AM    SUBJECTIVE:   No fevers or chills. Plan for drain removal today.  She is still confused.  OBJECTIVE   VITALS    height is 1.651 m (5' 5\") and weight is 40.8 kg (89 lb 15.2 oz). Her oral temperature is 98.2 °F (36.8 °C). Her blood pressure is 147/88 (abnormal) and her pulse is 83. Her respiration is 18 and oxygen saturation is 100%.       Wt Readings from Last 3 Encounters:   24 40.8 kg (89 lb 15.2 oz)   24 48.6 kg (107 lb 2.3 oz)   01/15/24 48.6 kg (107 lb 3.2 oz)       I/O (24 Hours)    Intake/Output Summary (Last 24 hours) at 2024 0830  Last data filed at 2024 0724  Gross per 24 hour   Intake 1280.21 ml   Output 979 ml   Net 301.21 ml       General Appearance  Awake,but confused  HEENT - normocephalic, atraumatic, pink conjunctiva,  anicteric sclera  Neck - Supple, no mass  Lungs -  Bilateral   air entry, no rhonchi, no wheeze  Cardiovascular - Heart sounds are normal.  Regular rate and rhythm without murmur, gallop or rub.  Abdomen - soft, not distended, nontender.    Soaked dressing on he lower lumbar area  Neurologic -Confused    Skin - No bruising or bleeding  Extremities - No edema, no cyanosis, clubbing     MEDICATIONS:      gentamicin (PF) (GARAMYCIN) 6 mg in sodium chloride 0.9 % 5 mL IV syringe   Other Daily    [Held by provider] gentamicin (PF) 6 mg in Sodium chloride 0.9% 5 ml syringe-INTRATHECAL   Other Daily    cefTRIAXone (ROCEPHIN) IV  2,000 mg IntraVENous Q12H    DULoxetine  20 mg Oral Nightly    rOPINIRole  1 mg Oral Nightly    lidocaine  3 patch TransDERmal Daily    senna  2 tablet Oral Nightly    famotidine  20 mg Oral Daily    amLODIPine  5 mg Oral Daily    [Held by provider]

## 2024-04-08 NOTE — PROGRESS NOTES
[facility]  OCCUPATIONAL THERAPY MISSED TREATMENT NOTE  [facility] CVICU 4B  4B-05/005-A      Date: 2024  Patient Name: Alpa Marte        CSN: [id number]   : 1955  (68 y.o.)  Gender: female   Referring Practitioner: Santino Stewart PA-C  Diagnosis: Adjacent segment disease of lumbar spine with history of fusion procedure         REASON FOR MISSED TREATMENT:  Attempted to see patient for lunch, and have patient sit up to eat, pt currently laying on left side, and attempted to lay on back and eat, with pt then having extreme pain with attempting to switch positions. Will attempt back as time allows.

## 2024-04-08 NOTE — PROGRESS NOTES
CRITICAL CARE PROGRESS NOTE      Patient:   Alpa Marte    Unit/Bed: 4B-05/005-A  YOB: 1955  MRN:  [id number]   PCP:  Connie Augustin PA  Date of Admission:  3/26/2024    Chief Complaint:  Acute hemorrhage    Assessment and Plan:  Meningitis and bacteremia: Improving.  Leukocytosis peak 4/2, now decreasing.  CSF molecular PCR 4/2 significant for E. coli K1.  Negative streptococci, negative Listeria. 19,780 leukocyte/cc CSF.  Patient encephalopathic beginning 4/2.  No fever, nuchal rigidity. No photophobia.  Empiric vancomycin, ampicillin discontinued 4/3.  Cultures show sensitivity to ceftriaxone. S/p 4 intrathecal doses of gentamicin.  ID following  Neurosurgery following  Ceftriaxone 2g q12h beginning 4/2, 21 day course.  Lumbar drain to be pulled today, 4/8.  Monitor 4/8 CSF cultures.  Acute left cerebellar hemorrhage: Stable on CT head 4/1. On 3/31, 12 x 7 mm area of acute hemorrhage left cerebellar hemisphere, with bilateral tentorium cerebri hemorrhage noted on MRI.  Neurology signed off, defer to neurosurgery. Wound VAC removed 3/29.    Neurosurgery following  Dexamethasone  PT OT  Up in chair as tolerated  Holding anticoagulation/antiplatelet. Resume per neurosurgery.  Hyponatremia, acute: Patient has low oral intake.  She has had oscillating serum sodiums./8, sodium 128 from prior 134.  100 cc an hour NS x 1  Monitor BMP  Screen for SIADH and Screen for adrenal insufficiency.   Acute encephalopathy: Secondary to meningitis, possibly lower contribution of urinary retention.  Patient required 3 straight caths 4/2, each draining over 400 cc urine.  Gentamicin has notable side effect of delirium.  Lama catheter  S/p lumbar fusion L3-sacrum: POD 13.  Similar to prior procedures in 1989, 2003 at [facility].  Wound VAC removed 3/29.  Due to CSF leak, lumbar drain by IR on 4/1.  Lumbar drain line kinked on 4/2.  Lumbar CT 4/3 shows some extrathecal gas.  Lumbar drain revised  4/3.  Neurosurgery following.  10-15 cc/h drainage target.  Hypertensive urgency: Resolved.  On no home antihypertensives. /97 on 3/26. As needed labetalol since 3/26, continues to have uncontrolled and labile blood pressure.  Amlodipine 5 mg po qd  Labetalol 5 mg IV q4h prn  Hydralazine 5 mg IV every 6 hours for SBP > 160 mmHg  Restless leg syndrome: Elevated ferritin.  No anemia.  Continue home Requip  Depression: Continue home Cymbalta  Tobacco abuse: Discussed tobacco cessation  Malnutrition: BMI 16.59.  Beta-hydroxybutyrate 4/3 elevated.  Diet, with dietary supplement  Acute kidney injury, stage I: Resolved.  Baseline creatinine 0.5.    High anion gap metabolic acidosis: Resolved.  Bicarb, BUN improving.  Lactic acid WNL, beta-hydroxybutyrate elevated 4/3.    INITIAL H&P AND COURSE:  Patient has history of chronic lumbar back pain, has had previous surgery and lumbar fusions in 1989, 2003 at [facility].  Most recently had similar procedure done by Dr. Caldera on 3/26.  Patient developed episodes of staring spells and twitching, and shortly thereafter had EEG 3/29 showing abnormality.  On 3/30 had CT head showing indistinct hyperattenuation left occipital lobe suspicious for parenchymal hemorrhage.  Follow-up MRI then showed 12 x 7 mm area of acute hemorrhage in left cerebellar hemisphere.  Neurosurgery was consulted.  They recommended continued treatment with dexamethasone.  She had lumbar drain placed by IR at L4 disc and traversing up to T10.  The patient began to complain of exquisite pain in her back.  She had a very hard time getting comfortable, and was moving in bed quite a bit.  At some point, lumbar drain line became kinked.  Neurosurgery was made aware.  Patient was steadily becoming more encephalopathic.  Blood cultures were drawn, CSF cultures were drawn.  CSF initially showed gram-negative bacteria and leukocytes.  Meningitis PCR then ordered, returns positive for E. coli.  Patient started on  auscultation.  No retractions  Cardiac: Normal sinus rhythm.  No JVD.  No murmurs or abnormal heart sounds.  Abdomen: soft.  Nontender.  Lumbar drain with no kinks  Extremities:  No clubbing, cyanosis, or edema x 4.    Vasculature: capillary refill < 3 seconds. Palpable dorsalis pedis pulses.  Skin:  warm and dry.  Psych:  Alert and oriented x3.  Affect appropriate  Lymph:  No supraclavicular adenopathy.  Neurologic:  No focal deficit. No seizures.    Data: (All radiographs, tracings, PFTs, and imaging are personally viewed and interpreted unless otherwise noted).     Sodium 128 of 133  Chloride 33  CO2 22, stable  BUN 16 from 25  Creatinine 0.4  Anion gap 13.0  Glucose random, 66  WBC 22.5 from 22.3 from 13.1.  Acutely increased.  Macrocytosis, MCV one 3.6.  No anemia.  Hemoglobin 12.3.    Microbiology:  4/2, CSF culture, moderate segmented neutrophils, heavy growth E. coli  4/2, CSF culture, many segmented neutrophils, heavy growth E. coli  4/2, meningitis molecular panel, history show coli K1 detected  4/2, blood culture 1, E. coli  4/2, blood culture 2, E. coli  4/2, blood molecular PCR, E. Coli  4/4, CSF culture, moderate gram-negative bacilli visualized  4/5, blood culture x 2, NGTD  4/8, CSF culture, pending.      Case and plan discussed with Dr. Brannon Castaneda MD.  Electronically signed by David Chatman DO, IM PGY-1  Patient seen by me including key components of medical care.  Case discussed with resident physician.  Intrathecal gentamicin.  Lumbar drain persists. Initiate tube feeds due to poor oral intake.  Reculture CSF and blood.  Italicized font, if present,  represents changes to the note made by me.  CC time 35 minutes.  Time was discontiguous. Time does not include procedure. Time does include my direct assessment of the patient and coordination of care.  Time represents more than 50% of the time involved with patient care by the CC team.  Electronically signed by Brannon Castaneda MD.

## 2024-04-08 NOTE — PROGRESS NOTES
Neurosurgery Progress Note     Date: 4/8/2024   Patient:  Alpa Marte  YOB: 1955  Age: 68 y.o.  MRN: [id number]       Chief Complaint:   No chief complaint on file.    Subjective     HPI -  68 y.o. female who presented to [facility] for a planned surgical intervention. She presented with complaints of chronic worsening low back pain radiating primarily to the left lower extremity all the way to the foot including chronic weakness and chronic partial left foot drop.  She does not take any medication other than over-the-counter medications and treats with ice and heat and rest.  In the past she has been treated by pain specialist who provided some injection therapies (greater than 20 years prior) that provided only transient relief. A CT scan imaged on 2/21/2024 that reflect pedicle screws from L4-S1 along with laminectomies and a grade 3 anterior listhesis of L5 on S1 unchanged. The flexion-extension x-ray also shows the grade 3 to grade 4 anterior listhesis of L5 on S1 with no evidence of additional change in flexion or extension. SI joint imaging reveals some narrowing along with sclerosis for SI joints bilaterally.      Interval History -   4/6/24: POD #11. Patient continues have some confusion. No fevers overnight. Intrathecal gentamycin started yesterday.     4/8/24: Continued to have confusion, sitter at bedside. Continues to complain of back pain.  No fevers overnight. Lumbar drain is minimally functioning.     Review of Systems   Review of Systems   Constitutional:  Positive for activity change.   Musculoskeletal:  Positive for back pain.   Skin:  Positive for wound.   Psychiatric/Behavioral:  Positive for agitation, behavioral problems and confusion.      Medications     Current Facility-Administered Medications:     potassium chloride (KLOR-CON M) extended release tablet 40 mEq, 40 mEq, Oral, PRN **OR** potassium bicarb-citric acid (EFFER-K) effervescent tablet 40 mEq, 40 mEq,  Reexploration and Revision of Lumbar Instrumented Fusion for Extension of the Decompression and Instrumentation to L3-Pelvis performed by Charlie Caldera MD at [facility] OR    WRIST SURGERY Left 2004    [facility]       Family History:        Problem Relation Age of Onset    High Blood Pressure Mother     Diabetes Mother     Cancer Father     High Blood Pressure Brother     High Cholesterol Brother     Cancer Paternal Grandmother        Medications Prior to Admission:      Prior to Admission medications    Medication Sig Start Date End Date Taking? Authorizing Provider   sulfamethoxazole-trimethoprim (BACTRIM) 400-80 MG per tablet Take 1 tablet by mouth daily for 10 days 4/2/24 4/12/24 Yes Santino Stewart PA-C   DULoxetine (CYMBALTA) 20 MG extended release capsule Take 1 capsule by mouth at bedtime 2/20/24   Ruba Kerns MD   gabapentin (NEURONTIN) 300 MG capsule Take 2 capsules by mouth in the morning and 3 capsules by mouth in the evening. 5/6/21   Ruba Kerns MD   rOPINIRole (REQUIP) 1 MG tablet Take 1 tablet by mouth nightly 9/16/21   Ruba Kerns MD   Multiple Vitamin (MULTI-VITAMINS) TABS Take 1 tablet by mouth daily    Ruba Kerns MD   folic acid (FOLVITE) 1 MG tablet Take 1 tablet by mouth daily    Ruba Kerns MD   vitamin D (CHOLECALCIFEROL) 125 MCG (5000 UT) CAPS capsule Take 1 capsule by mouth daily    Ruba Kerns MD   B Complex CAPS Take 1 capsule by mouth daily    Ruba Kerns MD   ascorbic acid (VITAMIN C) 500 MG tablet Take 1 tablet by mouth daily    Ruba Kerns MD       Allergies:  Patient has no known allergies.    Social History:    TOBACCO:   reports that she has been smoking cigarettes. She has never used smokeless tobacco.  ETOH:   reports current alcohol use.      Physical Examination      BP (!) 147/88   Pulse 83   Temp 98.2 °F (36.8 °C) (Oral)   Resp 18   Ht 1.651 m (5' 5\")   Wt 40.8 kg (89 lb 15.2 oz)   SpO2  Vital Signs Last 24 Hrs  T(C): 37.3 (17 Jul 2023 22:56), Max: 37.3 (17 Jul 2023 22:56)  T(F): 99.1 (17 Jul 2023 22:56), Max: 99.1 (17 Jul 2023 22:56)  HR: 89 (17 Jul 2023 22:56) (89 - 100)  BP: 145/89 (17 Jul 2023 22:56) (145/89 - 176/110)  BP(mean): --  RR: 18 (17 Jul 2023 22:56) (18 - 18)  SpO2: 100% (17 Jul 2023 22:56) (100% - 100%)    Parameters below as of 17 Jul 2023 21:26  Patient On (Oxygen Delivery Method): room air        CONSTITUTIONAL: Well-groomed, in no apparent distress  EYES: No conjunctival or scleral injection, non-icteric;   ENMT: No external nasal lesions; MMM  RESPIRATORY: Breathing comfortably; lungs CTA without wheeze/rhonchi/rales  CARDIOVASCULAR: +S1S2, RRR, no M/G/R; no lower extremity edema  GASTROINTESTINAL: No palpable masses or tenderness, +BS throughout, no rebound/guarding  SKIN: 7 cm x 4 cm bullae in R. medial thigh, tender to palpation, no erythema  NEUROLOGIC: CN II-XII nonfocal, sensation intact in LEs b/l to light touch  PSYCHIATRIC: A+O x 3; mood and affect appropriate; appropriate insight and judgment Vital Signs Last 24 Hrs  T(C): 37.3 (17 Jul 2023 22:56), Max: 37.3 (17 Jul 2023 22:56)  T(F): 99.1 (17 Jul 2023 22:56), Max: 99.1 (17 Jul 2023 22:56)  HR: 89 (17 Jul 2023 22:56) (89 - 100)  BP: 145/89 (17 Jul 2023 22:56) (145/89 - 176/110)  BP(mean): --  RR: 18 (17 Jul 2023 22:56) (18 - 18)  SpO2: 100% (17 Jul 2023 22:56) (100% - 100%)    Parameters below as of 17 Jul 2023 21:26  Patient On (Oxygen Delivery Method): room air        CONSTITUTIONAL: Well-groomed, in no apparent distress  EYES: No conjunctival or scleral injection, non-icteric;   ENMT: No external nasal lesions; MMM  RESPIRATORY: Breathing comfortably; lungs CTA without wheeze/rhonchi/rales  CARDIOVASCULAR: +S1S2, RRR, no M/G/R; no lower extremity edema  GASTROINTESTINAL: No palpable masses or tenderness, +BS throughout, no rebound/guarding  SKIN: 7 cm x 4 cm abscess in R. medial thigh, tender to palpation, no erythema  NEUROLOGIC: CN II-XII nonfocal, sensation intact in LEs b/l to light touch  PSYCHIATRIC: A+O x 3; mood and affect appropriate; appropriate insight and judgment

## 2024-04-09 LAB
ANION GAP SERPL CALC-SCNC: 10 MEQ/L (ref 8–16)
BACTERIA CSF CULT: ABNORMAL
BACTERIA SPEC ANAEROBE CULT: ABNORMAL
BASOPHILS ABSOLUTE: 0 THOU/MM3 (ref 0–0.1)
BASOPHILS NFR BLD AUTO: 0.1 %
BUN SERPL-MCNC: 15 MG/DL (ref 7–22)
CALCIUM SERPL-MCNC: 7.9 MG/DL (ref 8.5–10.5)
CHLORIDE SERPL-SCNC: 98 MEQ/L (ref 98–111)
CO2 SERPL-SCNC: 23 MEQ/L (ref 23–33)
CORTIS SERPL-MCNC: 16.79 UG/DL
CORTISOL COLLECTION INFO: NORMAL
CREAT SERPL-MCNC: 0.4 MG/DL (ref 0.4–1.2)
DEPRECATED RDW RBC AUTO: 45.2 FL (ref 35–45)
EOSINOPHIL NFR BLD AUTO: 0.5 %
EOSINOPHILS ABSOLUTE: 0.1 THOU/MM3 (ref 0–0.4)
ERYTHROCYTE [DISTWIDTH] IN BLOOD BY AUTOMATED COUNT: 12.8 % (ref 11.5–14.5)
GFR SERPL CREATININE-BSD FRML MDRD: > 90 ML/MIN/1.73M2
GLUCOSE SERPL-MCNC: 98 MG/DL (ref 70–108)
GRAM STN SPEC: ABNORMAL
HCT VFR BLD AUTO: 30.2 % (ref 37–47)
HGB BLD-MCNC: 10.4 GM/DL (ref 12–16)
IMM GRANULOCYTES # BLD AUTO: 0.1 THOU/MM3 (ref 0–0.07)
IMM GRANULOCYTES NFR BLD AUTO: 0.6 %
INDIA INK PREP CSF: ABNORMAL
LYMPHOCYTES ABSOLUTE: 1.3 THOU/MM3 (ref 1–4.8)
LYMPHOCYTES NFR BLD AUTO: 8.4 %
MAGNESIUM SERPL-MCNC: 1.9 MG/DL (ref 1.6–2.4)
MCH RBC QN AUTO: 33.5 PG (ref 26–33)
MCHC RBC AUTO-ENTMCNC: 34.4 GM/DL (ref 32.2–35.5)
MCV RBC AUTO: 97.4 FL (ref 81–99)
MONOCYTES ABSOLUTE: 1.5 THOU/MM3 (ref 0.4–1.3)
MONOCYTES NFR BLD AUTO: 9.6 %
NEUTROPHILS NFR BLD AUTO: 80.8 %
NRBC BLD AUTO-RTO: 0 /100 WBC
ORGANISM: ABNORMAL
PLATELET # BLD AUTO: 256 THOU/MM3 (ref 130–400)
PMV BLD AUTO: 10.8 FL (ref 9.4–12.4)
POTASSIUM SERPL-SCNC: 3.9 MEQ/L (ref 3.5–5.2)
RBC # BLD AUTO: 3.1 MILL/MM3 (ref 4.2–5.4)
SEGMENTED NEUTROPHILS ABSOLUTE COUNT: 12.5 THOU/MM3 (ref 1.8–7.7)
SODIUM SERPL-SCNC: 131 MEQ/L (ref 135–145)
WBC # BLD AUTO: 15.5 THOU/MM3 (ref 4.8–10.8)

## 2024-04-09 PROCEDURE — 99232 SBSQ HOSP IP/OBS MODERATE 35: CPT | Performed by: INTERNAL MEDICINE

## 2024-04-09 PROCEDURE — 80048 BASIC METABOLIC PNL TOTAL CA: CPT

## 2024-04-09 PROCEDURE — 83735 ASSAY OF MAGNESIUM: CPT

## 2024-04-09 PROCEDURE — 92526 ORAL FUNCTION THERAPY: CPT

## 2024-04-09 PROCEDURE — 2060000000 HC ICU INTERMEDIATE R&B

## 2024-04-09 PROCEDURE — 6370000000 HC RX 637 (ALT 250 FOR IP): Performed by: STUDENT IN AN ORGANIZED HEALTH CARE EDUCATION/TRAINING PROGRAM

## 2024-04-09 PROCEDURE — 2500000003 HC RX 250 WO HCPCS

## 2024-04-09 PROCEDURE — 36415 COLL VENOUS BLD VENIPUNCTURE: CPT

## 2024-04-09 PROCEDURE — 85025 COMPLETE CBC W/AUTO DIFF WBC: CPT

## 2024-04-09 PROCEDURE — 6360000002 HC RX W HCPCS: Performed by: NEUROLOGICAL SURGERY

## 2024-04-09 PROCEDURE — 6370000000 HC RX 637 (ALT 250 FOR IP): Performed by: NURSE PRACTITIONER

## 2024-04-09 PROCEDURE — 6370000000 HC RX 637 (ALT 250 FOR IP)

## 2024-04-09 PROCEDURE — 97535 SELF CARE MNGMENT TRAINING: CPT

## 2024-04-09 PROCEDURE — 97129 THER IVNTJ 1ST 15 MIN: CPT

## 2024-04-09 PROCEDURE — 2580000003 HC RX 258

## 2024-04-09 PROCEDURE — 6370000000 HC RX 637 (ALT 250 FOR IP): Performed by: PHYSICIAN ASSISTANT

## 2024-04-09 PROCEDURE — 6360000002 HC RX W HCPCS

## 2024-04-09 PROCEDURE — 82533 TOTAL CORTISOL: CPT

## 2024-04-09 PROCEDURE — 2580000003 HC RX 258: Performed by: PHYSICIAN ASSISTANT

## 2024-04-09 RX ORDER — KETOROLAC TROMETHAMINE 30 MG/ML
15 INJECTION, SOLUTION INTRAMUSCULAR; INTRAVENOUS EVERY 6 HOURS PRN
Status: DISPENSED | OUTPATIENT
Start: 2024-04-09 | End: 2024-04-14

## 2024-04-09 RX ADMIN — Medication 1.5 MCG/KG/HR: at 05:34

## 2024-04-09 RX ADMIN — KETOROLAC TROMETHAMINE 15 MG: 30 INJECTION, SOLUTION INTRAMUSCULAR at 09:35

## 2024-04-09 RX ADMIN — FAMOTIDINE 20 MG: 20 TABLET, FILM COATED ORAL at 09:36

## 2024-04-09 RX ADMIN — HYDROMORPHONE HYDROCHLORIDE 1 MG: 1 INJECTION, SOLUTION INTRAMUSCULAR; INTRAVENOUS; SUBCUTANEOUS at 12:36

## 2024-04-09 RX ADMIN — HYDROMORPHONE HYDROCHLORIDE 0.5 MG: 1 INJECTION, SOLUTION INTRAMUSCULAR; INTRAVENOUS; SUBCUTANEOUS at 09:35

## 2024-04-09 RX ADMIN — ZOLPIDEM TARTRATE 5 MG: 5 TABLET ORAL at 20:43

## 2024-04-09 RX ADMIN — AMLODIPINE BESYLATE 5 MG: 5 TABLET ORAL at 09:36

## 2024-04-09 RX ADMIN — OXYCODONE 5 MG: 5 TABLET ORAL at 15:13

## 2024-04-09 RX ADMIN — NALOXEGOL OXALATE 12.5 MG: 12.5 TABLET, FILM COATED ORAL at 09:36

## 2024-04-09 RX ADMIN — KETOROLAC TROMETHAMINE 15 MG: 30 INJECTION, SOLUTION INTRAMUSCULAR at 15:13

## 2024-04-09 RX ADMIN — Medication 1 MCG/KG/HR: at 15:41

## 2024-04-09 RX ADMIN — OXYCODONE 5 MG: 5 TABLET ORAL at 10:39

## 2024-04-09 RX ADMIN — HYDROMORPHONE HYDROCHLORIDE 0.5 MG: 1 INJECTION, SOLUTION INTRAMUSCULAR; INTRAVENOUS; SUBCUTANEOUS at 05:33

## 2024-04-09 RX ADMIN — ROPINIROLE HYDROCHLORIDE 1 MG: 1 TABLET, FILM COATED ORAL at 20:43

## 2024-04-09 RX ADMIN — POLYETHYLENE GLYCOL 3350 17 G: 17 POWDER, FOR SOLUTION ORAL at 09:42

## 2024-04-09 RX ADMIN — CEFTRIAXONE SODIUM 2000 MG: 2 INJECTION, POWDER, FOR SOLUTION INTRAMUSCULAR; INTRAVENOUS at 05:30

## 2024-04-09 RX ADMIN — CYCLOBENZAPRINE 10 MG: 10 TABLET, FILM COATED ORAL at 09:36

## 2024-04-09 RX ADMIN — DULOXETINE HYDROCHLORIDE 20 MG: 20 CAPSULE, DELAYED RELEASE ORAL at 20:45

## 2024-04-09 RX ADMIN — SENNOSIDES 17.2 MG: 8.6 TABLET ORAL at 20:45

## 2024-04-09 RX ADMIN — SODIUM CHLORIDE, PRESERVATIVE FREE 10 ML: 5 INJECTION INTRAVENOUS at 20:47

## 2024-04-09 RX ADMIN — OXYCODONE 5 MG: 5 TABLET ORAL at 19:37

## 2024-04-09 RX ADMIN — HYDROMORPHONE HYDROCHLORIDE 1 MG: 1 INJECTION, SOLUTION INTRAMUSCULAR; INTRAVENOUS; SUBCUTANEOUS at 19:37

## 2024-04-09 RX ADMIN — SODIUM CHLORIDE: 9 INJECTION, SOLUTION INTRAVENOUS at 05:28

## 2024-04-09 RX ADMIN — HYDROMORPHONE HYDROCHLORIDE 1 MG: 1 INJECTION, SOLUTION INTRAMUSCULAR; INTRAVENOUS; SUBCUTANEOUS at 15:13

## 2024-04-09 RX ADMIN — CEFTRIAXONE SODIUM 2000 MG: 2 INJECTION, POWDER, FOR SOLUTION INTRAMUSCULAR; INTRAVENOUS at 17:21

## 2024-04-09 RX ADMIN — ACETAMINOPHEN 650 MG: 325 TABLET ORAL at 12:43

## 2024-04-09 ASSESSMENT — PAIN DESCRIPTION - DESCRIPTORS
DESCRIPTORS: STABBING;THROBBING;SORE
DESCRIPTORS: THROBBING;SORE;SHARP
DESCRIPTORS: THROBBING;SORE;STABBING
DESCRIPTORS: STABBING;THROBBING
DESCRIPTORS: THROBBING;STABBING
DESCRIPTORS: THROBBING;STABBING

## 2024-04-09 ASSESSMENT — PAIN SCALES - GENERAL
PAINLEVEL_OUTOF10: 7
PAINLEVEL_OUTOF10: 5
PAINLEVEL_OUTOF10: 8
PAINLEVEL_OUTOF10: 4
PAINLEVEL_OUTOF10: 8
PAINLEVEL_OUTOF10: 8
PAINLEVEL_OUTOF10: 5
PAINLEVEL_OUTOF10: 5
PAINLEVEL_OUTOF10: 6

## 2024-04-09 ASSESSMENT — PAIN DESCRIPTION - ORIENTATION
ORIENTATION: MID;LOWER
ORIENTATION: MID
ORIENTATION: MID;LOWER
ORIENTATION: MID
ORIENTATION: MID;LOWER

## 2024-04-09 ASSESSMENT — PAIN DESCRIPTION - LOCATION
LOCATION: BACK

## 2024-04-09 NOTE — PROGRESS NOTES
CRITICAL CARE PROGRESS NOTE      Patient:   Alpa Marte    Unit/Bed: 4B-05/005-A  YOB: 1955  MRN:  [id number]   PCP:  Connie Augustin PA  Date of Admission:  3/26/2024    Chief Complaint:  Acute hemorrhage    Assessment and Plan:  Meningitis and bacteremia: Improving.  Leukocytosis peak 4/2, now decreasing.  CSF molecular PCR 4/2 significant for E. coli K1.  Negative streptococci, negative Listeria. 19,780 leukocyte/cc CSF.  Patient encephalopathic beginning 4/2.  No fever, nuchal rigidity. No photophobia.  Empiric vancomycin, ampicillin discontinued 4/3.  Cultures show sensitivity to ceftriaxone. S/p 4 intrathecal doses of gentamicin.  Status post lumbar drain, removed 4/8.  ID following  Neurosurgery following  Ceftriaxone 2g q12h beginning 4/2, 21 day course.  Monitor 4/8 CSF cultures.  NGTD.  Acute left cerebellar hemorrhage: Stable on CT head 4/1. On 3/31, 12 x 7 mm area of acute hemorrhage left cerebellar hemisphere, with bilateral tentorium cerebri hemorrhage noted on MRI.  Neurology signed off, defer to neurosurgery. Wound VAC removed 3/29.  S/p dexamethasone.  Neurosurgery following  PT OT  Up in chair as tolerated  Holding anticoagulation/antiplatelet. Resume per neurosurgery.  Hyponatremia, acute: Patient has low oral intake.  She has had oscillating serum sodiums. 4/8, sodium 128 from prior 134.  Urine sodium 105, likely SIADH, a.m. cortisol drawn approximately 8 AM 4/9 WNL.  Monitor BMP  Acute encephalopathy: Secondary to meningitis, possibly lower contribution of urinary retention.  Patient required 3 straight caths 4/2, each draining over 400 cc urine.  Gentamicin has notable side effect of delirium.  Lama catheter  S/p lumbar fusion L3-sacrum: POD 14.  Similar to prior procedures in 1989, 2003 at [facility].  Wound VAC removed 3/29.  Due to CSF leak, lumbar drain by IR on 4/1.  Lumbar drain line kinked on 4/2.  Lumbar CT 4/3 shows some extrathecal gas.  Lumbar drain  revised 4/3.  Neurosurgery following.  10-15 cc/h drainage target.  Hypertensive urgency: Resolved.  On no home antihypertensives. /97 on 3/26. As needed labetalol since 3/26, continues to have uncontrolled and labile blood pressure.  Amlodipine 5 mg po qd  Labetalol 5 mg IV q4h prn  Hydralazine 5 mg IV every 6 hours for SBP > 160 mmHg  Restless leg syndrome: Elevated ferritin.  No anemia.  Continue home Requip  Depression: Continue home Cymbalta  Tobacco abuse: Discussed tobacco cessation  Malnutrition: BMI 16.59.  Beta-hydroxybutyrate 4/3 elevated.  Diet, with dietary supplement  Acute kidney injury, stage I: Resolved.  Baseline creatinine 0.5.    High anion gap metabolic acidosis: Resolved.  Bicarb, BUN improving.  Lactic acid WNL, beta-hydroxybutyrate elevated 4/3.    INITIAL H&P AND COURSE:  Patient has history of chronic lumbar back pain, has had previous surgery and lumbar fusions in 1989, 2003 at [facility].  Most recently had similar procedure done by Dr. Caldera on 3/26.  Patient developed episodes of staring spells and twitching, and shortly thereafter had EEG 3/29 showing abnormality.  On 3/30 had CT head showing indistinct hyperattenuation left occipital lobe suspicious for parenchymal hemorrhage.  Follow-up MRI then showed 12 x 7 mm area of acute hemorrhage in left cerebellar hemisphere.  Neurosurgery was consulted.  They recommended continued treatment with dexamethasone.  She had lumbar drain placed by IR at L4 disc and traversing up to T10.  The patient began to complain of exquisite pain in her back.  She had a very hard time getting comfortable, and was moving in bed quite a bit.  At some point, lumbar drain line became kinked.  Neurosurgery was made aware.  Patient was steadily becoming more encephalopathic.  Blood cultures were drawn, CSF cultures were drawn.  CSF initially showed gram-negative bacteria and leukocytes.  Meningitis PCR then ordered, returns positive for E. coli.  Patient

## 2024-04-09 NOTE — CARE COORDINATION
4/9/24, 10:33 AM EDT    DISCHARGE PLANNING EVALUATION     Rosemary CM states pt may be a candidate for LTACH. SW called son Ja to discuss, left message.

## 2024-04-09 NOTE — PROGRESS NOTES
Neurosurgery Progress Note     Date: 4/9/2024   Patient:  Alpa Marte  YOB: 1955  Age: 68 y.o.  MRN: [id number]       Chief Complaint:   No chief complaint on file.    Subjective     HPI -  68 y.o. female who presented to [facility] for a planned surgical intervention. She presented with complaints of chronic worsening low back pain radiating primarily to the left lower extremity all the way to the foot including chronic weakness and chronic partial left foot drop.  She does not take any medication other than over-the-counter medications and treats with ice and heat and rest.  In the past she has been treated by pain specialist who provided some injection therapies (greater than 20 years prior) that provided only transient relief. A CT scan imaged on 2/21/2024 that reflect pedicle screws from L4-S1 along with laminectomies and a grade 3 anterior listhesis of L5 on S1 unchanged. The flexion-extension x-ray also shows the grade 3 to grade 4 anterior listhesis of L5 on S1 with no evidence of additional change in flexion or extension. SI joint imaging reveals some narrowing along with sclerosis for SI joints bilaterally.      Interval History -   4/6/24: POD #11. Patient continues have some confusion. No fevers overnight. Intrathecal gentamycin started yesterday.     4/8/24: Continued to have confusion, sitter at bedside. Continues to complain of back pain.  No fevers overnight. Lumbar drain is minimally functioning.     4/9/24: No acute events overnight.  Sitter at bedside.  Patient remains confused and complaining of low back pain, worse on the right side.  Preliminary CSF cultures without growth    Review of Systems   Review of Systems   Constitutional:  Positive for activity change.   Musculoskeletal:  Positive for back pain.   Skin:  Positive for wound.   Psychiatric/Behavioral:  Positive for agitation, behavioral problems and confusion.      Medications     Current  [facility]    LUMBAR FUSION  2003    [facility]    LUMBAR FUSION N/A 3/26/2024    Reexploration and Revision of Lumbar Instrumented Fusion for Extension of the Decompression and Instrumentation to L3-Pelvis performed by Charlie Caldera MD at [facility] OR    WRIST SURGERY Left 2004    [facility]       Family History:        Problem Relation Age of Onset    High Blood Pressure Mother     Diabetes Mother     Cancer Father     High Blood Pressure Brother     High Cholesterol Brother     Cancer Paternal Grandmother        Medications Prior to Admission:      Prior to Admission medications    Medication Sig Start Date End Date Taking? Authorizing Provider   sulfamethoxazole-trimethoprim (BACTRIM) 400-80 MG per tablet Take 1 tablet by mouth daily for 10 days 4/2/24 4/12/24 Yes Santino Stewart PA-C   DULoxetine (CYMBALTA) 20 MG extended release capsule Take 1 capsule by mouth at bedtime 2/20/24   Ruba Kerns MD   gabapentin (NEURONTIN) 300 MG capsule Take 2 capsules by mouth in the morning and 3 capsules by mouth in the evening. 5/6/21   Ruba Kerns MD   rOPINIRole (REQUIP) 1 MG tablet Take 1 tablet by mouth nightly 9/16/21   Ruba Kerns MD   Multiple Vitamin (MULTI-VITAMINS) TABS Take 1 tablet by mouth daily    Ruba Kerns MD   folic acid (FOLVITE) 1 MG tablet Take 1 tablet by mouth daily    Ruba Kerns MD   vitamin D (CHOLECALCIFEROL) 125 MCG (5000 UT) CAPS capsule Take 1 capsule by mouth daily    Ruba Kerns MD   B Complex CAPS Take 1 capsule by mouth daily    Ruba Kerns MD   ascorbic acid (VITAMIN C) 500 MG tablet Take 1 tablet by mouth daily    Ruba Kerns MD       Allergies:  Patient has no known allergies.    Social History:    TOBACCO:   reports that she has been smoking cigarettes. She has never used smokeless tobacco.  ETOH:   reports current alcohol use.      Physical Examination      BP (!) 109/51   Pulse

## 2024-04-09 NOTE — CARE COORDINATION
4/9/24, 3:04 PM EDT    DISCHARGE ON GOING EVALUATION    [facility] day: 14  Location: -05/005-A Reason for admit: Adjacent segment disease of lumbar spine with history of fusion procedure [M51.36, Z98.1]  S/P lumbar fusion [Z98.1]  Meningitis [G03.9]     Procedures:   3/26 Reexploration and Revision of Lumbar Instrumented Fusion for Extension of the Decompression and Instrumentation to L3-Pelvis    3/29 lumbar drain removed  4/1 Lumbar CSF drain placed  4/3 CSF Lumbar drain replaced in IR  4/8 Lumbar drain removed    Barriers to Discharge: Lumbar drain removed yesterday. Needs BM; on bowel regimen. Remains NPO. Sitter at bedside. Remains on precedex drip. Received LTACH eval order.     Tmax 99.5. -120's. On room air. Oriented to self. Follows commands. CSF +EColi. Intensivist, Neurosurgery, ID, and Physiatry following. SLP/PT/OT. CSF Meningitis Panel +EColi. Precedex @ 1.5 mcg/kg/hr, IV rocephin Q12H, prn flexeril, prn IV dilaudid, prn IV toradol, movantik,  prn roxicodone, glycolax, senna, Electrolyte replacement protocols.     PCP: Connie Augustin PA  Readmission Risk Score: 11.9%    Patient Goals/Plan/Treatment Preferences: From home. Received LTACH eval order. SW on case.

## 2024-04-09 NOTE — PROGRESS NOTES
[facility]  INPATIENT SPEECH THERAPY  STRZ CCU 3A  DAILY NOTE    TIME   SLP Individual Minutes  Time In: 931  Time Out: 951  Minutes: 20  Timed Code Treatment Minutes: 10 Minutes       Dysphagia tx: 10 minutes   Cognitive tx: 10 minutes     Date: 2024  Patient Name: Alpa Marte      CSN: [id number]   : 1955  (68 y.o.)  Gender: female   Referring Physician:  David Chatman DO   Diagnosis: Adjacent segment disease of lumbar spine with history of fusion procedure  Precautions: Fall risk, aspiration precautions, lumbar drain   Current Diet: Soft and bite-size diet, thin liquids  Respiratory Status: Room Air  Swallowing Strategies: Full Upright Position, Small Bite/Sip, Pulmonary Monitoring, and Alternate Solids and Liquids     Date of Last MBS/FEES: Not Applicable    Pain:  10/10 - Pain location: lumbar region (nursing aware and addressing concerns)    Subjective:  JAMES Varghese with approval to proceed with session and indicating lumbar drain has been removed.. Upon arrival to room, patient awake in bed with obvious signs of discomfort/pain to lumbar region. Patient agreeable to limited session this date. Patient continues to present with confusion.     Short-Term Goals:  SHORT TERM GOAL #1:  Goal 1: Patient will complete immediate/delayed recall tasks with 70% accuracy, mod cues to improve retention of pertinent, novel information.-GOAL NOT MET; REVISE   REVISED GOAL 1: Patient will complete functional recall tasks (orientation, biographical, call light, 1-3 units) with 60% accuracy given mod cues to improve recall of pertinent daily information.   INTERVENTIONS:   Orientation  Place -  independent   Name of Hospital - total assist   City - total assist   Year- independent  Month - total assist   [place] - max cues with calendar   Date - max cues with calendar   Time - independent   *nonsensical responses    Functional carryover  -Call light: max cues required to locate and discern established  safety device with simulation x1 conducted in attempts to promote carryover; do not anticipate generalized usage d/t degree of confusion presenting, live sitter maintains    *notable decline at date; should persistent deficits maintain, recommend modifying POC    SHORT TERM GOAL #2:  Goal 2: Patient will complete mildly complex verbal/visual reasoning, thought organization, and executive functioning tasks (household obligations, vocational employment) with 70% accuracy, mod cues to improve contributions within daily living requirements. - GOAL NOT MET; REVISE   REVISED Goal 2: Patient will complete functional verbal/visual reasoning, thought organization, and executive functioning tasks with 65% accuracy, mod cues to improve contributions within daily living requirements.   INTERVENTIONS: Did not address this session d/t focus on other goals.     SHORT TERM GOAL #3:  Goal 3: Patient will complete higher level attention tasks with no more than 5 errors/re-directions until task completion to permit potential return to multi-tasking, IADLs, driving, and work-related responsibilities. - GOAL NOT MET; REVISE   REVISED Goal 3:Patient will complete attention tasks with no more than 5 errors/re-directions until task completion to permit potential return to multi-tasking, IADLs, driving, and work-related responsibilities.   INTERVENTIONS: Consistent need for re-directions d/t significantly compromised mental focus    SHORT TERM GOAL #4:  Goal 4: Patient will consume baseline diet of regular solids/thin liquids with stable pulmonary status and incorporation of trained compensatory strategies to assist with nutrition/hydration measures. - GOAL NOT MET; REVISE   REVISED Goal 4: Patient will consume a soft and bite-size diet/thin liquids with stable pulmonary status and incorporation of trained compensatory strategies to assist with nutrition/hydration measures.    INTERVENTIONS: (Patient with consumption of po medications whole

## 2024-04-09 NOTE — PLAN OF CARE
Problem: Discharge Planning  Goal: Discharge to home or other facility with appropriate resources  Outcome: Progressing  Flowsheets (Taken 4/8/2024 0800 by Tevin Hernandez, RN)  Discharge to home or other facility with appropriate resources:   Identify barriers to discharge with patient and caregiver   Arrange for needed discharge resources and transportation as appropriate   Identify discharge learning needs (meds, wound care, etc)   Refer to discharge planning if patient needs post-hospital services based on physician order or complex needs related to functional status, cognitive ability or social support system  Note: Pt not appropriate for discharge at this time, will continue to monitor and reassess.        Problem: Pain  Goal: Verbalizes/displays adequate comfort level or baseline comfort level  Outcome: Progressing  Flowsheets (Taken 4/7/2024 1600 by Tevin Hernandez, RN)  Verbalizes/displays adequate comfort level or baseline comfort level:   Encourage patient to monitor pain and request assistance   Assess pain using appropriate pain scale   Administer analgesics based on type and severity of pain and evaluate response   Implement non-pharmacological measures as appropriate and evaluate response   Notify Licensed Independent Practitioner if interventions unsuccessful or patient reports new pain   Consider cultural and social influences on pain and pain management  Note: Pt has no complaints of pain at this time. Pain medication management provided. Will continue to monitor and reassess.        Problem: ABCDS Injury Assessment  Goal: Absence of physical injury  Outcome: Progressing  Flowsheets (Taken 4/1/2024 1712 by Tere Taylor, RN)  Absence of Physical Injury: Implement safety measures based on patient assessment     Problem: Safety - Adult  Goal: Free from fall injury  Outcome: Progressing  Flowsheets (Taken 4/1/2024 1712 by Tere Taylor, RN)  Free From Fall Injury:   Instruct family/caregiver  pressure ulcer prevention bundle as indicated  Note: Skin integrity remains CDI, pt turned every 2 hours, heels elevated off bed. Will continue to complete skin assessments.

## 2024-04-09 NOTE — PROGRESS NOTES
[facility]  STRZ CVICU 4B  Occupational Therapy  Daily Note  Time:    Time In: 1216  Time Out: 1224  Timed Code Treatment Minutes: 8 Minutes  Minutes: 8          Date: 2024  Patient Name: Alpa Marte,   Gender: female      Room: [facility]05/005-A  MRN: [id number]  : 1955  (68 y.o.)  Referring Practitioner: Santino Stewart PA-C  Diagnosis: Adjacent segment disease of lumbar spine with history of fusion procedure  Additional Pertinent Hx: Per MD H & P:68 y.o.  female, an every day smoker tobacco and 1/2 pack day history who admits to regular alcohol use and has a medical history significant for restless leg syndrome and urinary incontinence with a surgical history that includes prior lumbar fusions in  and again in  performed at [facility] resulting in pedicle screw and garima instrumentation at lumbar 4 spanning to S1.  She presents today unaccompanied and ambulating without assistance with complaints of chronic worsening low back pain radiating primarily to the left lower extremity all the way to the foot including chronic weakness and chronic partial left foot drop.  She does not take any medication other than over-the-counter medications and treats with ice and heat and rest.  In the past she has been treated by pain specialist who provided some injection therapies (greater than 20 years prior) that provided only transient relief. A CT scan imaged on 2024 that reflect pedicle screws from L4-S1 along with laminectomies and a grade 3 anterior listhesis of L5 on S1 unchanged. The flexion-extension x-ray also shows the grade 3 to grade 4 anterior listhesis of L5 on S1 with no evidence of additional change in flexion or extension. SI joint imaging reveals some narrowing along with sclerosis for SI joints bilaterally.  s/p Reexploration and Revision of Lumbar Instrumented Fusion for Extension of the Decompression and Instrumentation to L3-Pelvis on 3/26. CT 3/30    AM-PAC Inpatient Daily Activity Raw Score: 7  AM-PAC Inpatient ADL T-Scale Score : 20.13  ADL Inpatient CMS 0-100% Score: 92.44    Modified [place] Scale:  +5 - Severe disability; bedridden, incontinent and requiring constant nursing care and attention   Patient could not live alone, could not walk from one room to another without physical help from another person, and can not sit up in bed without any help.  Education provided regarding stroke rehabilitation management.    ASSESSMENT:  Assessment: Plan to decrease POC to 3-5x per week d/t poor tolerance with pain. Will continued OT to work toward increased indep & safety for eventual returning to ADLs at home.  Activity Tolerance:  Patient tolerance of  treatment: Poor treatment tolerance       Discharge Recommendations: Continue to assess pending progress  Equipment Recommendations: Other: Monitor need for LHAE  Plan: Times Per Week: 3-5x  Times Per Day: Once a day  Current Treatment Recommendations: Balance training, ROM, Functional mobility training, Endurance training, Safety education & training, Patient/Caregiver education & training, Equipment evaluation, education, & procurement, Self-Care / ADL, Home management training    Education:  Learners: Patient  See above    Goals  Short Term Goals  Time Frame for Short Term Goals: Until discharge  Short Term Goal 1: Pt will complete grooming task while standing at sink with 0>CGA & min vcs for safety & sequencing  Short Term Goal 2: Pt will tolerate standing 2-3 min with CGA for increased ease of sinkside grooming  Short Term Goal 3: Pt will complete sit-stand t/fs with CGA & min vcs for safety for increased indep with toilet t/fs  Short Term Goal 4: Pt will complete mobility to bedside chair or BSC with RW, CGA, & min vcs for safety  Short Term Goal 5: Pt will complete self feeding with HOB elevated & min vcs for initiation  Additional Goals?: No  Long Term Goals  Time Frame for Long Term Goals : No LTG set d/t  short ELOS    Following session, patient left in safe position with all fall risk precautions in place.

## 2024-04-09 NOTE — PROGRESS NOTES
Comprehensive Nutrition Assessment    Type and Reason for Visit:  Reassess, Consult (supplemental tubefeeding ordering and management)    Nutrition Recommendations/Plan:   If NGT replaced due to continued very poor appetite/intake and supplemental tubefeedings started would recommend start Pivot 1.5 at 10 ml/hr, increase 10 ml/hr every 6 hours as tolerated to a goal of 40 ml/hr. Additional free water flush per Provider.  No BM documented since admit 14 days now, discussed with RN, scheduled bowel medications in place.   Consider MVI and ? Appetite stimulant.  Continue ONS: Ensure Plus TID.  Added Magic cup BID today.  Continue ileus prevention: activia yogurt and hot beverage with meals.  Patient voiced she does like yogurt.     Malnutrition Assessment:  Malnutrition Status:  Severe malnutrition (04/02/24 1422)    Context:  Chronic Illness     Findings of the 6 clinical characteristics of malnutrition:  Energy Intake:  75% or less estimated energy requirements for 1 month or longer  Weight Loss:  7.5% over 3 months (8% loss in 3 months from office visit weight of 108# 6.4 oz on 1/4/24)     Body Fat Loss:  Severe body fat loss Triceps, Fat Overlying Ribs, Buccal region, Orbital   Muscle Mass Loss:  Severe muscle mass loss Temples (temporalis), Scapula (trapezius), Clavicles (pectoralis & deltoids), Thigh (quadriceps), Calf (gastrocnemius)  Fluid Accumulation:  No significant fluid accumulation     Strength:  Not Performed    Nutrition Assessment:      Pt. with minimal improvement from a nutritional standpoint AEB meal intake 25% or less but does like ONS and yogurt.  Remains at risk for further nutritional compromise r/t severe malnutrition, constipation, acute L cerebellar hemorrhage on 3/31, s/p lumbar fusion L3-sacrum- wound vac removed 3/29 and lumbar drain placed by IR 4/1 d/t CSF leak-removed 4/8/24, hypertensive urgency, E. coli bacteremia with meningitis, and underlying medical condition (PMHx: s/p lumbar  fusion 1998 and 2003, current smoker).       Nutrition Related Findings:    Pt. Report/Treatments/Miscellaneous: Patient seen earlier this morning, has cami dowd, RN, and Dr. Caldera present as well. NGT was placed last night per Abimael RAMIREZ, unfortunately unable to bridle due to patient having deviated septum and she pulled out prior to tubefeedings being started.  Plan to encourage oral intake today and see if improves, if not RN plans to retrial NGT placement.  Patient reports pain above her right hip, \"when I am in pain I don't eat\".  Dr. Caldera working on pain control, adding toradol today.  States she likes Ensure and yogurt, also really likes ice cream so added magic cups.SLP following.   GI Status: no BM since admit, 14 days. Patient denies feeling constipated of belly pain  Pertinent Labs: 4/8/24: Sodium 132  Pertinent Meds: precedex, rocephin, pepcid, movantik, glycolax, senokot, dilaudid, oxycodone      Wound Type:  (lumbar incision - wound vac removed 3/29; lumbar drain 4/1 d/t CSF leak)       Current Nutrition Intake & Therapies:    Average Meal Intake: 1-25% (most meals)  Average Supplements Intake:  (states she likes ONS)  ADULT ORAL NUTRITION SUPPLEMENT; Breakfast, Lunch, Dinner; Other Oral Supplement; Activia and hot beverage  ADULT DIET; Regular  ADULT ORAL NUTRITION SUPPLEMENT; Breakfast, Lunch, Dinner; Standard High Calorie/High Protein Oral Supplement  ADULT TUBE FEEDING; Nasogastric; Immune Enhancing; Continuous; 10; Yes; 10; Q 6 hours; 40; 30; Q 4 hours  ADULT ORAL NUTRITION SUPPLEMENT; Lunch, Dinner; Frozen Oral Supplement  Current Tube Feeding (TF) Orders:  Feeding Route: Nasogastric (placed 4/8/24-patient removed), ? Replace today   Formula: Immune Enhancing  Schedule: Continuous  Feeding Regimen: Pivot 1.5 at 10 ml/hr,  increase 10 ml/hr every 6 hours as tolerated to a goal of 40 ml/hr.  Additives/Modulars: None  Water Flushes: per Provider  Goal TF & Flush Orders Provides: Pivot 1.5 at 40

## 2024-04-09 NOTE — PROGRESS NOTES
Progress note: Infectious diseases    Patient - Alpa Marte,  Age - 68 y.o.    - 1955      Room Number - 4B-05/005-A   MRN -  [id number]   [facility] # - [id number]  Date of Admission -  3/26/2024  7:58 AM    SUBJECTIVE:   The drain was removed.  Sitter at bed side  Poor oral intake, she pulled out NGT  OBJECTIVE   VITALS    height is 1.651 m (5' 5\") and weight is 41.3 kg (91 lb 0.8 oz). Her axillary temperature is 98.2 °F (36.8 °C). Her blood pressure is 118/59 (abnormal) and her pulse is 67. Her respiration is 21 and oxygen saturation is 97%.       Wt Readings from Last 3 Encounters:   24 41.3 kg (91 lb 0.8 oz)   24 48.6 kg (107 lb 2.3 oz)   01/15/24 48.6 kg (107 lb 3.2 oz)       I/O (24 Hours)    Intake/Output Summary (Last 24 hours) at 2024 0808  Last data filed at 2024 0620  Gross per 24 hour   Intake 330 ml   Output 650 ml   Net -320 ml         General Appearance  asleep  HEENT - normocephalic, atraumatic, pink conjunctiva,  anicteric sclera  Neck - Supple, no mass  Lungs -  Bilateral   air entry, no rhonchi, no wheeze  Cardiovascular - Heart sounds are normal.  Regular rate and rhythm without murmur, gallop or rub.  Abdomen - soft, not distended, nontender.     Dry lumbar dressing  Neurologic -asleep  Skin - No bruising or bleeding  Extremities - No edema, no cyanosis, clubbing     MEDICATIONS:      naloxegol  12.5 mg Oral QAM AC    polyethylene glycol  17 g Oral Daily    cefTRIAXone (ROCEPHIN) IV  2,000 mg IntraVENous Q12H    DULoxetine  20 mg Oral Nightly    rOPINIRole  1 mg Oral Nightly    lidocaine  3 patch TransDERmal Daily    senna  2 tablet Oral Nightly    famotidine  20 mg Oral Daily    amLODIPine  5 mg Oral Daily    [Held by provider] gabapentin  600 mg Oral QAM    And    [Held by provider] gabapentin  900 mg Oral Nightly    sodium chloride flush  5-40 mL IntraVENous 2 times per day       dexmedeTOMIDine 1.5 mcg/kg/hr (04/09/24 0534)    sodium chloride 10 mL/hr at 04/09/24 0528     potassium chloride **OR** potassium alternative oral replacement **OR** potassium chloride, HYDROmorphone **OR** HYDROmorphone, oxyCODONE, zolpidem, acetaminophen, naloxone, [Held by provider] HYDROcodone 5 mg - acetaminophen **OR** [Held by provider] HYDROcodone 5 mg - acetaminophen, hydrALAZINE, ondansetron, labetalol, sodium chloride flush, sodium chloride, cyclobenzaprine      LABS:     CBC:   Recent Labs     04/08/24  0331   WBC 22.5*   HGB 12.3          BMP:    Recent Labs     04/08/24  0331 04/08/24  1430   * 132*   K 3.5 4.0   CL 93* 96*   CO2 22* 23   BUN 16 18   CREATININE 0.4 0.5   GLUCOSE 66* 89       Calcium:  Recent Labs     04/08/24  1430   CALCIUM 8.4*       Ionized Calcium:No results for input(s): \"IONCA\" in the last 72 hours.  Magnesium:  Recent Labs     04/08/24  0331   MG 1.8           CULTURES:   UA: No results for input(s): \"SPECGRAV\", \"PHUR\", \"COLORU\", \"CLARITYU\", \"MUCUS\", \"PROTEINU\", \"BLOODU\", \"RBCUA\", \"WBCUA\", \"BACTERIA\", \"NITRU\", \"GLUCOSEU\", \"BILIRUBINUR\", \"UROBILINOGEN\", \"KETUA\", \"LABCAST\", \"LABCASTTY\", \"AMORPHOS\" in the last 72 hours.    Invalid input(s): \"CRYSTALS\"  Micro:   Lab Results   Component Value Date/Time    BC No growth 24 hours. No growth 48 hours. 04/05/2024 09:53 AM    BC No growth 24 hours. No growth 48 hours. 04/05/2024 09:53 AM         Problem list of patient:     Patient Active Problem List   Diagnosis Code    S/P lumbar fusion Z98.1    Moderate malnutrition (HCC) E44.0    Transient alteration of awareness R40.4    Intracranial bleeding (HCC) I62.9    Cerebellar hemorrhage (HCC) I61.4    Severe malnutrition (HCC) E43    Meningitis G03.9    Post-op pain G89.18         ASSESSMENT/PLAN   E. coli bacteremia with meningitis: Continue Rocephin every 12 hours.  Recent back surgery complicated with a dural leak:    Drain removed. Follow up CSF so far negative    Jag

## 2024-04-10 LAB
ANION GAP SERPL CALC-SCNC: 10 MEQ/L (ref 8–16)
APTT PPP: 32.8 SECONDS (ref 22–38)
BACTERIA BLD AEROBE CULT: NORMAL
BACTERIA BLD AEROBE CULT: NORMAL
BASOPHILS ABSOLUTE: 0 THOU/MM3 (ref 0–0.1)
BASOPHILS NFR BLD AUTO: 0.2 %
BUN SERPL-MCNC: 21 MG/DL (ref 7–22)
CALCIUM SERPL-MCNC: 7.9 MG/DL (ref 8.5–10.5)
CHLORIDE SERPL-SCNC: 96 MEQ/L (ref 98–111)
CO2 SERPL-SCNC: 25 MEQ/L (ref 23–33)
CREAT SERPL-MCNC: 0.3 MG/DL (ref 0.4–1.2)
DEPRECATED RDW RBC AUTO: 47.7 FL (ref 35–45)
EOSINOPHIL NFR BLD AUTO: 0.1 %
EOSINOPHILS ABSOLUTE: 0 THOU/MM3 (ref 0–0.4)
ERYTHROCYTE [DISTWIDTH] IN BLOOD BY AUTOMATED COUNT: 12.9 % (ref 11.5–14.5)
GFR SERPL CREATININE-BSD FRML MDRD: > 90 ML/MIN/1.73M2
GLUCOSE SERPL-MCNC: 120 MG/DL (ref 70–108)
HCT VFR BLD AUTO: 32.2 % (ref 37–47)
HGB BLD-MCNC: 10.7 GM/DL (ref 12–16)
IMM GRANULOCYTES # BLD AUTO: 0.12 THOU/MM3 (ref 0–0.07)
IMM GRANULOCYTES NFR BLD AUTO: 0.6 %
INR PPP: 1.07 (ref 0.85–1.13)
LYMPHOCYTES ABSOLUTE: 1.1 THOU/MM3 (ref 1–4.8)
LYMPHOCYTES NFR BLD AUTO: 5.3 %
MCH RBC QN AUTO: 34.1 PG (ref 26–33)
MCHC RBC AUTO-ENTMCNC: 33.2 GM/DL (ref 32.2–35.5)
MCV RBC AUTO: 102.5 FL (ref 81–99)
MONOCYTES ABSOLUTE: 1.6 THOU/MM3 (ref 0.4–1.3)
MONOCYTES NFR BLD AUTO: 7.7 %
NEUTROPHILS NFR BLD AUTO: 86.1 %
NRBC BLD AUTO-RTO: 0 /100 WBC
PLATELET # BLD AUTO: 282 THOU/MM3 (ref 130–400)
PMV BLD AUTO: 10.9 FL (ref 9.4–12.4)
POTASSIUM SERPL-SCNC: 3.8 MEQ/L (ref 3.5–5.2)
RBC # BLD AUTO: 3.14 MILL/MM3 (ref 4.2–5.4)
SEGMENTED NEUTROPHILS ABSOLUTE COUNT: 18.4 THOU/MM3 (ref 1.8–7.7)
SODIUM SERPL-SCNC: 131 MEQ/L (ref 135–145)
WBC # BLD AUTO: 21.4 THOU/MM3 (ref 4.8–10.8)

## 2024-04-10 PROCEDURE — 6360000002 HC RX W HCPCS: Performed by: NEUROLOGICAL SURGERY

## 2024-04-10 PROCEDURE — 6360000002 HC RX W HCPCS

## 2024-04-10 PROCEDURE — 99024 POSTOP FOLLOW-UP VISIT: CPT | Performed by: NEUROLOGICAL SURGERY

## 2024-04-10 PROCEDURE — 85025 COMPLETE CBC W/AUTO DIFF WBC: CPT

## 2024-04-10 PROCEDURE — 6370000000 HC RX 637 (ALT 250 FOR IP)

## 2024-04-10 PROCEDURE — 85730 THROMBOPLASTIN TIME PARTIAL: CPT

## 2024-04-10 PROCEDURE — 36415 COLL VENOUS BLD VENIPUNCTURE: CPT

## 2024-04-10 PROCEDURE — 2580000003 HC RX 258: Performed by: PHYSICIAN ASSISTANT

## 2024-04-10 PROCEDURE — 2060000000 HC ICU INTERMEDIATE R&B

## 2024-04-10 PROCEDURE — 6370000000 HC RX 637 (ALT 250 FOR IP): Performed by: PHYSICIAN ASSISTANT

## 2024-04-10 PROCEDURE — 51702 INSERT TEMP BLADDER CATH: CPT

## 2024-04-10 PROCEDURE — 6370000000 HC RX 637 (ALT 250 FOR IP): Performed by: STUDENT IN AN ORGANIZED HEALTH CARE EDUCATION/TRAINING PROGRAM

## 2024-04-10 PROCEDURE — 6370000000 HC RX 637 (ALT 250 FOR IP): Performed by: NURSE PRACTITIONER

## 2024-04-10 PROCEDURE — 2580000003 HC RX 258

## 2024-04-10 PROCEDURE — 85610 PROTHROMBIN TIME: CPT

## 2024-04-10 PROCEDURE — 80048 BASIC METABOLIC PNL TOTAL CA: CPT

## 2024-04-10 PROCEDURE — APPSS30 APP SPLIT SHARED TIME 16-30 MINUTES

## 2024-04-10 PROCEDURE — 99232 SBSQ HOSP IP/OBS MODERATE 35: CPT | Performed by: INTERNAL MEDICINE

## 2024-04-10 RX ORDER — POLYETHYLENE GLYCOL 3350 17 G/17G
17 POWDER, FOR SOLUTION ORAL 2 TIMES DAILY
Status: DISCONTINUED | OUTPATIENT
Start: 2024-04-10 | End: 2024-04-19 | Stop reason: HOSPADM

## 2024-04-10 RX ADMIN — ROPINIROLE HYDROCHLORIDE 1 MG: 1 TABLET, FILM COATED ORAL at 20:24

## 2024-04-10 RX ADMIN — HYDROMORPHONE HYDROCHLORIDE 1 MG: 1 INJECTION, SOLUTION INTRAMUSCULAR; INTRAVENOUS; SUBCUTANEOUS at 08:56

## 2024-04-10 RX ADMIN — OXYCODONE 5 MG: 5 TABLET ORAL at 10:53

## 2024-04-10 RX ADMIN — HYDROMORPHONE HYDROCHLORIDE 1 MG: 1 INJECTION, SOLUTION INTRAMUSCULAR; INTRAVENOUS; SUBCUTANEOUS at 04:39

## 2024-04-10 RX ADMIN — HYDROMORPHONE HYDROCHLORIDE 1 MG: 1 INJECTION, SOLUTION INTRAMUSCULAR; INTRAVENOUS; SUBCUTANEOUS at 10:53

## 2024-04-10 RX ADMIN — SENNOSIDES 17.2 MG: 8.6 TABLET ORAL at 20:25

## 2024-04-10 RX ADMIN — OXYCODONE 5 MG: 5 TABLET ORAL at 20:23

## 2024-04-10 RX ADMIN — CYCLOBENZAPRINE 10 MG: 10 TABLET, FILM COATED ORAL at 00:42

## 2024-04-10 RX ADMIN — POLYETHYLENE GLYCOL 3350 17 G: 17 POWDER, FOR SOLUTION ORAL at 20:39

## 2024-04-10 RX ADMIN — NALOXEGOL OXALATE 12.5 MG: 12.5 TABLET, FILM COATED ORAL at 05:24

## 2024-04-10 RX ADMIN — CYCLOBENZAPRINE 10 MG: 10 TABLET, FILM COATED ORAL at 13:19

## 2024-04-10 RX ADMIN — HYDROMORPHONE HYDROCHLORIDE 1 MG: 1 INJECTION, SOLUTION INTRAMUSCULAR; INTRAVENOUS; SUBCUTANEOUS at 17:01

## 2024-04-10 RX ADMIN — AMLODIPINE BESYLATE 5 MG: 5 TABLET ORAL at 08:19

## 2024-04-10 RX ADMIN — HYDROMORPHONE HYDROCHLORIDE 1 MG: 1 INJECTION, SOLUTION INTRAMUSCULAR; INTRAVENOUS; SUBCUTANEOUS at 00:42

## 2024-04-10 RX ADMIN — KETOROLAC TROMETHAMINE 15 MG: 30 INJECTION, SOLUTION INTRAMUSCULAR at 08:18

## 2024-04-10 RX ADMIN — POLYETHYLENE GLYCOL 3350 17 G: 17 POWDER, FOR SOLUTION ORAL at 08:18

## 2024-04-10 RX ADMIN — SODIUM CHLORIDE, PRESERVATIVE FREE 10 ML: 5 INJECTION INTRAVENOUS at 20:24

## 2024-04-10 RX ADMIN — CEFTRIAXONE SODIUM 2000 MG: 2 INJECTION, POWDER, FOR SOLUTION INTRAMUSCULAR; INTRAVENOUS at 16:46

## 2024-04-10 RX ADMIN — CEFTRIAXONE SODIUM 2000 MG: 2 INJECTION, POWDER, FOR SOLUTION INTRAMUSCULAR; INTRAVENOUS at 04:39

## 2024-04-10 RX ADMIN — HYDROMORPHONE HYDROCHLORIDE 1 MG: 1 INJECTION, SOLUTION INTRAMUSCULAR; INTRAVENOUS; SUBCUTANEOUS at 22:19

## 2024-04-10 RX ADMIN — OXYCODONE 5 MG: 5 TABLET ORAL at 03:09

## 2024-04-10 RX ADMIN — DULOXETINE HYDROCHLORIDE 20 MG: 20 CAPSULE, DELAYED RELEASE ORAL at 20:25

## 2024-04-10 RX ADMIN — FAMOTIDINE 20 MG: 20 TABLET, FILM COATED ORAL at 08:18

## 2024-04-10 ASSESSMENT — PAIN SCALES - GENERAL
PAINLEVEL_OUTOF10: 6
PAINLEVEL_OUTOF10: 10
PAINLEVEL_OUTOF10: 6
PAINLEVEL_OUTOF10: 8
PAINLEVEL_OUTOF10: 6
PAINLEVEL_OUTOF10: 8
PAINLEVEL_OUTOF10: 8
PAINLEVEL_OUTOF10: 9
PAINLEVEL_OUTOF10: 8
PAINLEVEL_OUTOF10: 10
PAINLEVEL_OUTOF10: 8
PAINLEVEL_OUTOF10: 10

## 2024-04-10 ASSESSMENT — PAIN DESCRIPTION - LOCATION
LOCATION: BACK

## 2024-04-10 ASSESSMENT — PAIN - FUNCTIONAL ASSESSMENT
PAIN_FUNCTIONAL_ASSESSMENT: PREVENTS OR INTERFERES WITH MANY ACTIVE NOT PASSIVE ACTIVITIES
PAIN_FUNCTIONAL_ASSESSMENT: ACTIVITIES ARE NOT PREVENTED
PAIN_FUNCTIONAL_ASSESSMENT: PREVENTS OR INTERFERES SOME ACTIVE ACTIVITIES AND ADLS
PAIN_FUNCTIONAL_ASSESSMENT: PREVENTS OR INTERFERES WITH MANY ACTIVE NOT PASSIVE ACTIVITIES
PAIN_FUNCTIONAL_ASSESSMENT: PREVENTS OR INTERFERES SOME ACTIVE ACTIVITIES AND ADLS

## 2024-04-10 ASSESSMENT — PAIN DESCRIPTION - ORIENTATION
ORIENTATION: LOWER

## 2024-04-10 ASSESSMENT — PAIN DESCRIPTION - FREQUENCY: FREQUENCY: CONTINUOUS

## 2024-04-10 ASSESSMENT — PAIN DESCRIPTION - DESCRIPTORS
DESCRIPTORS: ACHING;DISCOMFORT;STABBING;SPASM
DESCRIPTORS: ACHING;DISCOMFORT
DESCRIPTORS: ACHING;DISCOMFORT
DESCRIPTORS: ACHING;DISCOMFORT;STABBING
DESCRIPTORS: ACHING;DISCOMFORT

## 2024-04-10 ASSESSMENT — PAIN DESCRIPTION - PAIN TYPE: TYPE: SURGICAL PAIN

## 2024-04-10 ASSESSMENT — PAIN DESCRIPTION - ONSET: ONSET: ON-GOING

## 2024-04-10 NOTE — PROGRESS NOTES
CRITICAL CARE PROGRESS NOTE      Patient:   Alpa Marte    Unit/Bed: 4B-05/005-A  YOB: 1955  MRN:  [id number]   PCP:  Connie Augustin PA  Date of Admission:  3/26/2024    Chief Complaint:  Acute hemorrhage    Assessment and Plan:  Meningitis and bacteremia: Improving.  Leukocytosis peak 4/2, now decreasing.  CSF molecular PCR 4/2 significant for E. coli K1.  Negative streptococci, negative Listeria. 19,780 leukocyte/cc CSF.  Patient encephalopathic beginning 4/2.  No fever, nuchal rigidity. No photophobia.  Empiric vancomycin, ampicillin discontinued 4/3.  Cultures show sensitivity to ceftriaxone. S/p 4 intrathecal doses of gentamicin.  Status post lumbar drain, removed 4/8.  ID following  Neurosurgery following  Ceftriaxone 2g q12h beginning 4/2, 21 day course.  Monitor 4/8 CSF cultures.  NGTD.  Acute left cerebellar hemorrhage: Stable on CT head 4/1. On 3/31, 12 x 7 mm area of acute hemorrhage left cerebellar hemisphere, with bilateral tentorium cerebri hemorrhage noted on MRI.  Neurology signed off, defer to neurosurgery. Wound VAC removed 3/29.  S/p dexamethasone.  Neurosurgery following  PT OT  Up in chair as tolerated  Holding anticoagulation/antiplatelet. Resume per neurosurgery.  Hyponatremia, acute: Patient has low oral intake.  She has had oscillating serum sodiums. 4/8, sodium 128 from prior 134.  Urine sodium 105, likely SIADH, a.m. cortisol drawn approximately 8 AM 4/9 WNL.  Monitor BMP  Acute encephalopathy: Secondary to meningitis, possibly lower contribution of urinary retention.  Patient required 3 straight caths 4/2, each draining over 400 cc urine.  Gentamicin has notable side effect of delirium.  Lama catheter  S/p lumbar fusion L3-sacrum: POD 15.  Similar to prior procedures in 1989, 2003 at [facility].  Wound VAC removed 3/29.  Due to CSF leak, lumbar drain by IR on 4/1.  Lumbar drain line kinked on 4/2.  Lumbar CT 4/3 shows some extrathecal gas.  Lumbar drain  bacteria and leukocytes.  Meningitis PCR then ordered, returns positive for E. coli.  Patient started on ampicillin, vancomycin, ceftriaxone, eventually de-escalated to only ceftriaxone.  Patient began to develop confusion and agitation.  Haldol was used to alleviate patient agitation.  She continues to be quite uncomfortable, ending up kinking her lumbar drain, which needed revision by IR on 4/3.  After onset of antibiotics, lab work began to improve, accompanied by gradual improvement in mentation.      Past 24 Hr Progress:  Patient had good mentation today.  She was not able to eat as much for breakfast that she has the day prior.  She is limited in activity by pain.  She continues to have strong regimen of pain medication, oral, IV.  Difficult pain control.  Patient was alert and oriented x 4 today, was conversing coherently today.  She continues to have a sitter.  She continues to be somewhat restless, however was not agitated.    Scheduled Meds:   [START ON 4/11/2024] naloxegol  25 mg Oral QAM AC    polyethylene glycol  17 g Oral BID    cefTRIAXone (ROCEPHIN) IV  2,000 mg IntraVENous Q12H    DULoxetine  20 mg Oral Nightly    rOPINIRole  1 mg Oral Nightly    lidocaine  3 patch TransDERmal Daily    senna  2 tablet Oral Nightly    famotidine  20 mg Oral Daily    amLODIPine  5 mg Oral Daily    [Held by provider] gabapentin  600 mg Oral QAM    And    [Held by provider] gabapentin  900 mg Oral Nightly    sodium chloride flush  5-40 mL IntraVENous 2 times per day     Continuous Infusions:   sodium chloride Stopped (04/09/24 1721)       PHYSICAL EXAMINATION:  T: 99.5.  P: 22.  RR: 104.  B/P: 93/74.  FiO2: 21%.  O2 Sat: 98%.  I/O: +54.2 cc.  Body mass index is 17.65 kg/m².   O2 delivery:   Room air    General:   Frail-appearing female, underweight.  Somewhat confused. Restless.  HEENT:  normocephalic and atraumatic.  No scleral icterus. PERR  Neck: supple.  No Thyromegaly.  Lungs: clear to auscultation.  No

## 2024-04-10 NOTE — PROGRESS NOTES
Progress note: Infectious diseases    Patient - Alpa Marte,  Age - 68 y.o.    - 1955      Room Number - 4B-05/005-A   MRN -  [id number]   [facility] # - [id number]  Date of Admission -  3/26/2024  7:58 AM    SUBJECTIVE:    Sitter at bed side   Complaining of lower back pain  OBJECTIVE   VITALS    height is 1.651 m (5' 5\") and weight is 48.1 kg (106 lb 0.7 oz). Her oral temperature is 98.5 °F (36.9 °C). Her blood pressure is 132/106 (abnormal) and her pulse is 111 (abnormal). Her respiration is 22 and oxygen saturation is 97%.       Wt Readings from Last 3 Encounters:   04/10/24 48.1 kg (106 lb 0.7 oz)   24 48.6 kg (107 lb 2.3 oz)   01/15/24 48.6 kg (107 lb 3.2 oz)       I/O (24 Hours)    Intake/Output Summary (Last 24 hours) at 4/10/2024 1144  Last data filed at 4/10/2024 0949  Gross per 24 hour   Intake 3441.87 ml   Output 1225 ml   Net 2216.87 ml         General Appearance awake  HEENT - normocephalic, atraumatic, pink conjunctiva,  anicteric sclera  Neck - Supple, no mass  Lungs -  Bilateral   air entry, no rhonchi, no wheeze  Cardiovascular - Heart sounds are normal.  Regular rate and rhythm without murmur, gallop or rub.  Abdomen - soft, not distended, nontender.     Dry lumbar dressing. The area is swollen with fluid under the skin  Neurologic -awake  Skin - No bruising or bleeding  Extremities - No edema, no cyanosis, clubbing     MEDICATIONS:      [START ON 2024] naloxegol  25 mg Oral QAM AC    polyethylene glycol  17 g Oral BID    cefTRIAXone (ROCEPHIN) IV  2,000 mg IntraVENous Q12H    DULoxetine  20 mg Oral Nightly    rOPINIRole  1 mg Oral Nightly    lidocaine  3 patch TransDERmal Daily    senna  2 tablet Oral Nightly    famotidine  20 mg Oral Daily    amLODIPine  5 mg Oral Daily    [Held by provider] gabapentin  600 mg Oral QAM    And    [Held by provider] gabapentin  900 mg Oral Nightly    sodium

## 2024-04-10 NOTE — CONSENT
Patient has been having confusion, I discussed over the phone with the patient's brother, Ja Montoya, about the planned surgical intervention in the form of lumbar wound reexploration and revision for dural repair of CSF leak with possible fat or muscle graft from abdomen or thigh  in detail over the phone in the presence of Bonny Kennedy RN. I discussed with him  in detail the indication for surgery and the associated risk and benefits and the expected outcome. All questions and concerns were addressed and answered. Patient's brother, Ja agreed to proceed with surgical intervention and he gave me his verbal consent over the phone.     Electronically signed by Akilah Handley PA-C on 4/10/2024 at 5:20 PM

## 2024-04-10 NOTE — PLAN OF CARE
Problem: Discharge Planning  Goal: Discharge to home or other facility with appropriate resources  Outcome: Progressing  Flowsheets  Taken 4/9/2024 2000 by Sukhi Renteria RN  Discharge to home or other facility with appropriate resources:   Identify barriers to discharge with patient and caregiver   Arrange for needed discharge resources and transportation as appropriate   Identify discharge learning needs (meds, wound care, etc)   Refer to discharge planning if patient needs post-hospital services based on physician order or complex needs related to functional status, cognitive ability or social support system  Taken 4/9/2024 0800 by Tevin Hernandez RN  Discharge to home or other facility with appropriate resources:   Identify barriers to discharge with patient and caregiver   Arrange for needed discharge resources and transportation as appropriate   Identify discharge learning needs (meds, wound care, etc)   Refer to discharge planning if patient needs post-hospital services based on physician order or complex needs related to functional status, cognitive ability or social support system     Problem: Pain  Goal: Verbalizes/displays adequate comfort level or baseline comfort level  Outcome: Progressing  Flowsheets  Taken 4/9/2024 1700 by Tevin Hernandez RN  Verbalizes/displays adequate comfort level or baseline comfort level:   Encourage patient to monitor pain and request assistance   Assess pain using appropriate pain scale   Administer analgesics based on type and severity of pain and evaluate response   Implement non-pharmacological measures as appropriate and evaluate response   Consider cultural and social influences on pain and pain management   Notify Licensed Independent Practitioner if interventions unsuccessful or patient reports new pain  Taken 4/9/2024 1600 by Tevin Hernandez RN  Verbalizes/displays adequate comfort level or baseline comfort level:   Encourage patient to monitor pain and request  areas of redness and/or skin breakdown  2.  Assess vascular access sites hourly  3.  Every 4-6 hours minimum:  Change oxygen saturation probe site  4.  Every 4-6 hours:  If on nasal continuous positive airway pressure, respiratory therapy assess nares and determine need for appliance change or resting period.  Outcome: Progressing     Problem: Confusion  Goal: Confusion, delirium, dementia, or psychosis is improved or at baseline  Description: INTERVENTIONS:  1. Assess for possible contributors to thought disturbance, including medications, impaired vision or hearing, underlying metabolic abnormalities, dehydration, psychiatric diagnoses, and notify attending LIP  2. [place] high risk fall precautions, as indicated  3. Provide frequent short contacts to provide reality reorientation, refocusing and direction  4. Decrease environmental stimuli, including noise as appropriate  5. Monitor and intervene to maintain adequate nutrition, hydration, elimination, sleep and activity  6. If unable to ensure safety without constant attention obtain sitter and review sitter guidelines with assigned personnel  7. Initiate Psychosocial CNS and Spiritual Care consult, as indicated  Outcome: Progressing  Flowsheets  Taken 4/9/2024 2000 by Sukhi Renteria, RN  Effect of thought disturbance (confusion, delirium, dementia, or psychosis) are managed with adequate functional status: Assess for contributors to thought disturbance, including medications, impaired vision or hearing, underlying metabolic abnormalities, dehydration, psychiatric diagnoses, notify LIP  Taken 4/9/2024 0800 by Tevin Hernandez, RN  Effect of thought disturbance (confusion, delirium, dementia, or psychosis) are managed with adequate functional status:   Assess for contributors to thought disturbance, including medications, impaired vision or hearing, underlying metabolic abnormalities, dehydration, psychiatric diagnoses, notify LIP   [place] high risk fall

## 2024-04-10 NOTE — CARE COORDINATION
4/10/24, 10:01 AM EDT    DISCHARGE PLANNING EVALUATION     SW spoke with pts brother Ja, he confirmed he prefers [facility] or [facility] however, he understands pt may be medically too complex for SNF. Ja is agreeable to [facility] if needed. LANCE updated Rosemary SHERYL.

## 2024-04-10 NOTE — PROGRESS NOTES
over-the-counter medications and treats with ice and heat and rest.  In the past she has been treated by pain specialist who provided some injection therapies (greater than 20 years prior) that provided only transient relief. A CT scan imaged on 2/21/2024 that reflect pedicle screws from L4-S1 along with laminectomies and a grade 3 anterior listhesis of L5 on S1 unchanged. The flexion-extension x-ray also shows the grade 3 to grade 4 anterior listhesis of L5 on S1 with no evidence of additional change in flexion or extension. SI joint imaging reveals some narrowing along with sclerosis for SI joints bilaterally.      Interval History -   4/6/24: POD #11. Patient continues have some confusion. No fevers overnight. Intrathecal gentamycin started yesterday.     4/8/24: Continued to have confusion, sitter at bedside. Continues to complain of back pain.  No fevers overnight. Lumbar drain is minimally functioning.     4/9/24: No acute events overnight.  Sitter at bedside.  Patient remains confused and complaining of low back pain, worse on the right side.  Preliminary CSF cultures without growth.     4/10/24: Patient seen and evaluated on 4B, sitter at bedside. Remains confused and oriented to self only, fidgety. Dressings remained dry overnight but have been saturated today.     Review of Systems   Review of Systems   Constitutional:  Positive for activity change.   Musculoskeletal:  Positive for back pain.   Skin:  Positive for wound.   Psychiatric/Behavioral:  Positive for agitation, behavioral problems and confusion.      Medications     Current Facility-Administered Medications:     ketorolac (TORADOL) injection 15 mg, 15 mg, IntraVENous, Q6H PRN, Charlie Caldera MD, 15 mg at 04/09/24 1513    potassium chloride (KLOR-CON M) extended release tablet 40 mEq, 40 mEq, Oral, PRN **OR** potassium bicarb-citric acid (EFFER-K) effervescent tablet 40 mEq, 40 mEq, Oral, PRN, 40 mEq at 04/08/24 0839 **OR** potassium chloride 10  further questions or concerns.      This case was discussed with Dr. Caldera and he is in agreement with the assessment and plan.    Electronically signed by Akilah Handley PA-C on 4/10/2024 at 8:00 AM

## 2024-04-10 NOTE — PROGRESS NOTES
[facility]  PHYSICAL THERAPY MISSED TREATMENT NOTE  [facility] CVICU 4B    Date: 4/10/2024  Patient Name: Alpa Marte        MRN: [id number]   : 1955  (68 y.o.)  Gender: female   Referring Practitioner: Santino Stewart PA-C  Diagnosis: Adjacent segment disease of lumbar spine with history of fusion procedure         REASON FOR MISSED TREATMENT:  Pt resting in bed and having quite a bit of pain per grumbles and noted bed movements expect 9-10/10, Pt is scheduled for surgery tomorrow at 9 am. Will check back on Friday .      Mary Kate Wylie, MPT 0423

## 2024-04-11 ENCOUNTER — ANESTHESIA (OUTPATIENT)
Dept: OPERATING ROOM | Age: 69
End: 2024-04-11
Payer: MEDICARE

## 2024-04-11 ENCOUNTER — ANESTHESIA EVENT (OUTPATIENT)
Dept: OPERATING ROOM | Age: 69
End: 2024-04-11
Payer: MEDICARE

## 2024-04-11 LAB
ANION GAP SERPL CALC-SCNC: 14 MEQ/L (ref 8–16)
ANISOCYTOSIS BLD QL SMEAR: PRESENT
BASOPHILS ABSOLUTE: 0.1 THOU/MM3 (ref 0–0.1)
BASOPHILS NFR BLD AUTO: 0.2 %
BUN SERPL-MCNC: 16 MG/DL (ref 7–22)
CA-I BLD ISE-SCNC: 1.17 MMOL/L (ref 1.12–1.32)
CALCIUM SERPL-MCNC: 8.5 MG/DL (ref 8.5–10.5)
CHLORIDE SERPL-SCNC: 95 MEQ/L (ref 98–111)
CO2 SERPL-SCNC: 25 MEQ/L (ref 23–33)
CREAT SERPL-MCNC: 0.4 MG/DL (ref 0.4–1.2)
DEPRECATED RDW RBC AUTO: 46.1 FL (ref 35–45)
EOSINOPHIL NFR BLD AUTO: 0.2 %
EOSINOPHILS ABSOLUTE: 0.1 THOU/MM3 (ref 0–0.4)
ERYTHROCYTE [DISTWIDTH] IN BLOOD BY AUTOMATED COUNT: 13 % (ref 11.5–14.5)
GFR SERPL CREATININE-BSD FRML MDRD: > 90 ML/MIN/1.73M2
GLUCOSE SERPL-MCNC: 118 MG/DL (ref 70–108)
HCT VFR BLD AUTO: 31.3 % (ref 37–47)
HGB BLD-MCNC: 10.7 GM/DL (ref 12–16)
IMM GRANULOCYTES # BLD AUTO: 0.22 THOU/MM3 (ref 0–0.07)
IMM GRANULOCYTES NFR BLD AUTO: 0.8 %
LYMPHOCYTES ABSOLUTE: 1.7 THOU/MM3 (ref 1–4.8)
LYMPHOCYTES NFR BLD AUTO: 6.4 %
MAGNESIUM SERPL-MCNC: 1.9 MG/DL (ref 1.6–2.4)
MCH RBC QN AUTO: 33.2 PG (ref 26–33)
MCHC RBC AUTO-ENTMCNC: 34.2 GM/DL (ref 32.2–35.5)
MCV RBC AUTO: 97.2 FL (ref 81–99)
MONOCYTES ABSOLUTE: 1.4 THOU/MM3 (ref 0.4–1.3)
MONOCYTES NFR BLD AUTO: 5.1 %
NEUTROPHILS NFR BLD AUTO: 87.3 %
NRBC BLD AUTO-RTO: 0 /100 WBC
PLATELET # BLD AUTO: 334 THOU/MM3 (ref 130–400)
PMV BLD AUTO: 10.8 FL (ref 9.4–12.4)
POTASSIUM SERPL-SCNC: 3.9 MEQ/L (ref 3.5–5.2)
RBC # BLD AUTO: 3.22 MILL/MM3 (ref 4.2–5.4)
SEGMENTED NEUTROPHILS ABSOLUTE COUNT: 23.5 THOU/MM3 (ref 1.8–7.7)
SODIUM SERPL-SCNC: 134 MEQ/L (ref 135–145)
WBC # BLD AUTO: 26.9 THOU/MM3 (ref 4.8–10.8)

## 2024-04-11 PROCEDURE — 00UT0KZ SUPPLEMENT SPINAL MENINGES WITH NONAUTOLOGOUS TISSUE SUBSTITUTE, OPEN APPROACH: ICD-10-PCS | Performed by: NEUROLOGICAL SURGERY

## 2024-04-11 PROCEDURE — 6360000002 HC RX W HCPCS: Performed by: NURSE ANESTHETIST, CERTIFIED REGISTERED

## 2024-04-11 PROCEDURE — 36415 COLL VENOUS BLD VENIPUNCTURE: CPT

## 2024-04-11 PROCEDURE — 6370000000 HC RX 637 (ALT 250 FOR IP): Performed by: PHYSICIAN ASSISTANT

## 2024-04-11 PROCEDURE — 6370000000 HC RX 637 (ALT 250 FOR IP)

## 2024-04-11 PROCEDURE — 2720000010 HC SURG SUPPLY STERILE: Performed by: NEUROLOGICAL SURGERY

## 2024-04-11 PROCEDURE — 83735 ASSAY OF MAGNESIUM: CPT

## 2024-04-11 PROCEDURE — 3600000014 HC SURGERY LEVEL 4 ADDTL 15MIN: Performed by: NEUROLOGICAL SURGERY

## 2024-04-11 PROCEDURE — 87205 SMEAR GRAM STAIN: CPT

## 2024-04-11 PROCEDURE — 85025 COMPLETE CBC W/AUTO DIFF WBC: CPT

## 2024-04-11 PROCEDURE — 7100000001 HC PACU RECOVERY - ADDTL 15 MIN: Performed by: NEUROLOGICAL SURGERY

## 2024-04-11 PROCEDURE — 2580000003 HC RX 258: Performed by: PHYSICIAN ASSISTANT

## 2024-04-11 PROCEDURE — 6360000002 HC RX W HCPCS

## 2024-04-11 PROCEDURE — 3600000004 HC SURGERY LEVEL 4 BASE: Performed by: NEUROLOGICAL SURGERY

## 2024-04-11 PROCEDURE — 7100000000 HC PACU RECOVERY - FIRST 15 MIN: Performed by: NEUROLOGICAL SURGERY

## 2024-04-11 PROCEDURE — 89060 EXAM SYNOVIAL FLUID CRYSTALS: CPT

## 2024-04-11 PROCEDURE — 63707 REPAIR SPINAL FLUID LEAKAGE: CPT | Performed by: NEUROLOGICAL SURGERY

## 2024-04-11 PROCEDURE — 87102 FUNGUS ISOLATION CULTURE: CPT

## 2024-04-11 PROCEDURE — 3700000000 HC ANESTHESIA ATTENDED CARE: Performed by: NEUROLOGICAL SURGERY

## 2024-04-11 PROCEDURE — 2709999900 HC NON-CHARGEABLE SUPPLY: Performed by: NEUROLOGICAL SURGERY

## 2024-04-11 PROCEDURE — 6360000002 HC RX W HCPCS: Performed by: PHYSICIAN ASSISTANT

## 2024-04-11 PROCEDURE — 89051 BODY FLUID CELL COUNT: CPT

## 2024-04-11 PROCEDURE — 2500000003 HC RX 250 WO HCPCS: Performed by: NURSE ANESTHETIST, CERTIFIED REGISTERED

## 2024-04-11 PROCEDURE — 82330 ASSAY OF CALCIUM: CPT

## 2024-04-11 PROCEDURE — 99232 SBSQ HOSP IP/OBS MODERATE 35: CPT | Performed by: INTERNAL MEDICINE

## 2024-04-11 PROCEDURE — 3700000001 HC ADD 15 MINUTES (ANESTHESIA): Performed by: NEUROLOGICAL SURGERY

## 2024-04-11 PROCEDURE — 87075 CULTR BACTERIA EXCEPT BLOOD: CPT

## 2024-04-11 PROCEDURE — 2060000000 HC ICU INTERMEDIATE R&B

## 2024-04-11 PROCEDURE — 80048 BASIC METABOLIC PNL TOTAL CA: CPT

## 2024-04-11 PROCEDURE — 63707 REPAIR SPINAL FLUID LEAKAGE: CPT | Performed by: PHYSICIAN ASSISTANT

## 2024-04-11 PROCEDURE — 87070 CULTURE OTHR SPECIMN AEROBIC: CPT

## 2024-04-11 PROCEDURE — C1713 ANCHOR/SCREW BN/BN,TIS/BN: HCPCS | Performed by: NEUROLOGICAL SURGERY

## 2024-04-11 PROCEDURE — 2580000003 HC RX 258

## 2024-04-11 PROCEDURE — 6370000000 HC RX 637 (ALT 250 FOR IP): Performed by: NEUROLOGICAL SURGERY

## 2024-04-11 DEVICE — DURAGEN® SUTURABLE DURAL REGENERATION MATRIX, 2 IN X 2 IN (5 CM X 5 CM)
Type: IMPLANTABLE DEVICE | Site: SPINE LUMBAR | Status: FUNCTIONAL
Brand: DURAGEN® SUTURABLE

## 2024-04-11 RX ORDER — MORPHINE SULFATE 4 MG/ML
4 INJECTION, SOLUTION INTRAMUSCULAR; INTRAVENOUS
Status: DISCONTINUED | OUTPATIENT
Start: 2024-04-11 | End: 2024-04-12 | Stop reason: SDUPTHER

## 2024-04-11 RX ORDER — FENTANYL CITRATE 50 UG/ML
50 INJECTION, SOLUTION INTRAMUSCULAR; INTRAVENOUS EVERY 5 MIN PRN
Status: DISCONTINUED | OUTPATIENT
Start: 2024-04-11 | End: 2024-04-11 | Stop reason: HOSPADM

## 2024-04-11 RX ORDER — ONDANSETRON 2 MG/ML
INJECTION INTRAMUSCULAR; INTRAVENOUS PRN
Status: DISCONTINUED | OUTPATIENT
Start: 2024-04-11 | End: 2024-04-11 | Stop reason: SDUPTHER

## 2024-04-11 RX ORDER — NALOXONE HYDROCHLORIDE 0.4 MG/ML
INJECTION, SOLUTION INTRAMUSCULAR; INTRAVENOUS; SUBCUTANEOUS PRN
Status: DISCONTINUED | OUTPATIENT
Start: 2024-04-11 | End: 2024-04-11 | Stop reason: HOSPADM

## 2024-04-11 RX ORDER — SODIUM CHLORIDE 9 MG/ML
INJECTION, SOLUTION INTRAVENOUS PRN
Status: DISCONTINUED | OUTPATIENT
Start: 2024-04-11 | End: 2024-04-11 | Stop reason: HOSPADM

## 2024-04-11 RX ORDER — SODIUM CHLORIDE 0.9 % (FLUSH) 0.9 %
5-40 SYRINGE (ML) INJECTION PRN
Status: DISCONTINUED | OUTPATIENT
Start: 2024-04-11 | End: 2024-04-19 | Stop reason: HOSPADM

## 2024-04-11 RX ORDER — SODIUM CHLORIDE 0.9 % (FLUSH) 0.9 %
5-40 SYRINGE (ML) INJECTION PRN
Status: DISCONTINUED | OUTPATIENT
Start: 2024-04-11 | End: 2024-04-11 | Stop reason: HOSPADM

## 2024-04-11 RX ORDER — ROCURONIUM BROMIDE 10 MG/ML
INJECTION, SOLUTION INTRAVENOUS PRN
Status: DISCONTINUED | OUTPATIENT
Start: 2024-04-11 | End: 2024-04-11 | Stop reason: SDUPTHER

## 2024-04-11 RX ORDER — TRANEXAMIC ACID 100 MG/ML
INJECTION, SOLUTION INTRAVENOUS PRN
Status: DISCONTINUED | OUTPATIENT
Start: 2024-04-11 | End: 2024-04-11 | Stop reason: SDUPTHER

## 2024-04-11 RX ORDER — DEXAMETHASONE SODIUM PHOSPHATE 10 MG/ML
INJECTION, EMULSION INTRAMUSCULAR; INTRAVENOUS PRN
Status: DISCONTINUED | OUTPATIENT
Start: 2024-04-11 | End: 2024-04-11 | Stop reason: SDUPTHER

## 2024-04-11 RX ORDER — PHENYLEPHRINE HCL IN 0.9% NACL 1 MG/10 ML
SYRINGE (ML) INTRAVENOUS PRN
Status: DISCONTINUED | OUTPATIENT
Start: 2024-04-11 | End: 2024-04-11 | Stop reason: SDUPTHER

## 2024-04-11 RX ORDER — CEFAZOLIN SODIUM 1 G/3ML
INJECTION, POWDER, FOR SOLUTION INTRAMUSCULAR; INTRAVENOUS PRN
Status: DISCONTINUED | OUTPATIENT
Start: 2024-04-11 | End: 2024-04-11

## 2024-04-11 RX ORDER — SODIUM CHLORIDE 0.9 % (FLUSH) 0.9 %
5-40 SYRINGE (ML) INJECTION EVERY 12 HOURS SCHEDULED
Status: DISCONTINUED | OUTPATIENT
Start: 2024-04-11 | End: 2024-04-11 | Stop reason: HOSPADM

## 2024-04-11 RX ORDER — KETAMINE HCL IN NACL, ISO-OSM 100MG/10ML
SYRINGE (ML) INJECTION PRN
Status: DISCONTINUED | OUTPATIENT
Start: 2024-04-11 | End: 2024-04-11 | Stop reason: SDUPTHER

## 2024-04-11 RX ORDER — MORPHINE SULFATE 2 MG/ML
2 INJECTION, SOLUTION INTRAMUSCULAR; INTRAVENOUS
Status: DISCONTINUED | OUTPATIENT
Start: 2024-04-11 | End: 2024-04-12 | Stop reason: SDUPTHER

## 2024-04-11 RX ORDER — FENTANYL CITRATE 50 UG/ML
25 INJECTION, SOLUTION INTRAMUSCULAR; INTRAVENOUS EVERY 5 MIN PRN
Status: DISCONTINUED | OUTPATIENT
Start: 2024-04-11 | End: 2024-04-11 | Stop reason: HOSPADM

## 2024-04-11 RX ORDER — HYDROMORPHONE HYDROCHLORIDE 2 MG/ML
INJECTION, SOLUTION INTRAMUSCULAR; INTRAVENOUS; SUBCUTANEOUS PRN
Status: DISCONTINUED | OUTPATIENT
Start: 2024-04-11 | End: 2024-04-11 | Stop reason: SDUPTHER

## 2024-04-11 RX ORDER — GINSENG 100 MG
CAPSULE ORAL PRN
Status: DISCONTINUED | OUTPATIENT
Start: 2024-04-11 | End: 2024-04-11 | Stop reason: ALTCHOICE

## 2024-04-11 RX ORDER — PROPOFOL 10 MG/ML
INJECTION, EMULSION INTRAVENOUS PRN
Status: DISCONTINUED | OUTPATIENT
Start: 2024-04-11 | End: 2024-04-11 | Stop reason: SDUPTHER

## 2024-04-11 RX ORDER — SODIUM CHLORIDE 0.9 % (FLUSH) 0.9 %
5-40 SYRINGE (ML) INJECTION EVERY 12 HOURS SCHEDULED
Status: DISCONTINUED | OUTPATIENT
Start: 2024-04-11 | End: 2024-04-19 | Stop reason: HOSPADM

## 2024-04-11 RX ORDER — KETOROLAC TROMETHAMINE 30 MG/ML
INJECTION, SOLUTION INTRAMUSCULAR; INTRAVENOUS PRN
Status: DISCONTINUED | OUTPATIENT
Start: 2024-04-11 | End: 2024-04-11 | Stop reason: SDUPTHER

## 2024-04-11 RX ORDER — SODIUM CHLORIDE 9 MG/ML
INJECTION, SOLUTION INTRAVENOUS PRN
Status: DISCONTINUED | OUTPATIENT
Start: 2024-04-11 | End: 2024-04-19 | Stop reason: HOSPADM

## 2024-04-11 RX ORDER — FENTANYL CITRATE 50 UG/ML
INJECTION, SOLUTION INTRAMUSCULAR; INTRAVENOUS PRN
Status: DISCONTINUED | OUTPATIENT
Start: 2024-04-11 | End: 2024-04-11 | Stop reason: SDUPTHER

## 2024-04-11 RX ORDER — ESMOLOL HYDROCHLORIDE 10 MG/ML
INJECTION INTRAVENOUS PRN
Status: DISCONTINUED | OUTPATIENT
Start: 2024-04-11 | End: 2024-04-11 | Stop reason: SDUPTHER

## 2024-04-11 RX ADMIN — SODIUM CHLORIDE: 9 INJECTION, SOLUTION INTRAVENOUS at 09:15

## 2024-04-11 RX ADMIN — Medication 10 MG: at 10:20

## 2024-04-11 RX ADMIN — ROCURONIUM BROMIDE 50 MG: 10 INJECTION INTRAVENOUS at 09:18

## 2024-04-11 RX ADMIN — ROCURONIUM BROMIDE 30 MG: 10 INJECTION INTRAVENOUS at 10:30

## 2024-04-11 RX ADMIN — SODIUM CHLORIDE, PRESERVATIVE FREE 10 ML: 5 INJECTION INTRAVENOUS at 13:37

## 2024-04-11 RX ADMIN — ESMOLOL HYDROCHLORIDE 30 MG: 10 INJECTION, SOLUTION INTRAVENOUS at 11:17

## 2024-04-11 RX ADMIN — CEFTRIAXONE SODIUM 2000 MG: 2 INJECTION, POWDER, FOR SOLUTION INTRAMUSCULAR; INTRAVENOUS at 17:02

## 2024-04-11 RX ADMIN — SODIUM CHLORIDE, PRESERVATIVE FREE 10 ML: 5 INJECTION INTRAVENOUS at 21:01

## 2024-04-11 RX ADMIN — ESMOLOL HYDROCHLORIDE 30 MG: 10 INJECTION, SOLUTION INTRAVENOUS at 11:20

## 2024-04-11 RX ADMIN — WATER 2000 MG: 1 INJECTION INTRAMUSCULAR; INTRAVENOUS; SUBCUTANEOUS at 09:25

## 2024-04-11 RX ADMIN — PROPOFOL 140 MG: 10 INJECTION, EMULSION INTRAVENOUS at 09:18

## 2024-04-11 RX ADMIN — ROPINIROLE HYDROCHLORIDE 1 MG: 1 TABLET, FILM COATED ORAL at 21:01

## 2024-04-11 RX ADMIN — HYDROMORPHONE HYDROCHLORIDE 1 MG: 1 INJECTION, SOLUTION INTRAMUSCULAR; INTRAVENOUS; SUBCUTANEOUS at 03:22

## 2024-04-11 RX ADMIN — POLYETHYLENE GLYCOL 3350 17 G: 17 POWDER, FOR SOLUTION ORAL at 21:01

## 2024-04-11 RX ADMIN — ONDANSETRON 4 MG: 2 INJECTION INTRAMUSCULAR; INTRAVENOUS at 09:18

## 2024-04-11 RX ADMIN — SUGAMMADEX 50 MG: 100 INJECTION, SOLUTION INTRAVENOUS at 11:25

## 2024-04-11 RX ADMIN — CEFTRIAXONE SODIUM 2000 MG: 2 INJECTION, POWDER, FOR SOLUTION INTRAMUSCULAR; INTRAVENOUS at 05:16

## 2024-04-11 RX ADMIN — KETOROLAC TROMETHAMINE 15 MG: 30 INJECTION, SOLUTION INTRAMUSCULAR at 11:09

## 2024-04-11 RX ADMIN — SENNOSIDES 17.2 MG: 8.6 TABLET ORAL at 21:01

## 2024-04-11 RX ADMIN — HYDROMORPHONE HYDROCHLORIDE 0.4 MG: 2 INJECTION INTRAMUSCULAR; INTRAVENOUS; SUBCUTANEOUS at 10:01

## 2024-04-11 RX ADMIN — SUGAMMADEX 150 MG: 100 INJECTION, SOLUTION INTRAVENOUS at 11:37

## 2024-04-11 RX ADMIN — Medication 200 MCG: at 09:25

## 2024-04-11 RX ADMIN — Medication 20 MG: at 10:00

## 2024-04-11 RX ADMIN — Medication 200 MCG: at 09:28

## 2024-04-11 RX ADMIN — HYDROMORPHONE HYDROCHLORIDE 0.4 MG: 2 INJECTION INTRAMUSCULAR; INTRAVENOUS; SUBCUTANEOUS at 11:35

## 2024-04-11 RX ADMIN — Medication 200 MCG: at 09:40

## 2024-04-11 RX ADMIN — HYDROMORPHONE HYDROCHLORIDE 0.6 MG: 2 INJECTION INTRAMUSCULAR; INTRAVENOUS; SUBCUTANEOUS at 10:22

## 2024-04-11 RX ADMIN — Medication 200 MCG: at 09:54

## 2024-04-11 RX ADMIN — POLYETHYLENE GLYCOL 3350 17 G: 17 POWDER, FOR SOLUTION ORAL at 13:39

## 2024-04-11 RX ADMIN — FAMOTIDINE 20 MG: 20 TABLET, FILM COATED ORAL at 13:37

## 2024-04-11 RX ADMIN — FENTANYL CITRATE 100 MCG: 50 INJECTION, SOLUTION INTRAMUSCULAR; INTRAVENOUS at 09:18

## 2024-04-11 RX ADMIN — AMLODIPINE BESYLATE 5 MG: 5 TABLET ORAL at 13:37

## 2024-04-11 RX ADMIN — OXYCODONE 5 MG: 5 TABLET ORAL at 21:01

## 2024-04-11 RX ADMIN — DULOXETINE HYDROCHLORIDE 20 MG: 20 CAPSULE, DELAYED RELEASE ORAL at 21:01

## 2024-04-11 RX ADMIN — Medication 200 MCG: at 09:31

## 2024-04-11 RX ADMIN — HYDROMORPHONE HYDROCHLORIDE 0.5 MG: 1 INJECTION, SOLUTION INTRAMUSCULAR; INTRAVENOUS; SUBCUTANEOUS at 23:33

## 2024-04-11 RX ADMIN — HYDROMORPHONE HYDROCHLORIDE 0.6 MG: 2 INJECTION INTRAMUSCULAR; INTRAVENOUS; SUBCUTANEOUS at 11:20

## 2024-04-11 RX ADMIN — CYCLOBENZAPRINE 10 MG: 10 TABLET, FILM COATED ORAL at 18:42

## 2024-04-11 RX ADMIN — Medication 20 MG: at 09:45

## 2024-04-11 RX ADMIN — TRANEXAMIC ACID 1 MG: 100 INJECTION, SOLUTION INTRAVENOUS at 09:30

## 2024-04-11 RX ADMIN — DEXAMETHASONE SODIUM PHOSPHATE 10 MG: 10 INJECTION, EMULSION INTRAMUSCULAR; INTRAVENOUS at 09:18

## 2024-04-11 RX ADMIN — SODIUM CHLORIDE, PRESERVATIVE FREE 10 ML: 5 INJECTION INTRAVENOUS at 21:02

## 2024-04-11 ASSESSMENT — PAIN SCALES - GENERAL
PAINLEVEL_OUTOF10: 9
PAINLEVEL_OUTOF10: 5
PAINLEVEL_OUTOF10: 3
PAINLEVEL_OUTOF10: 5
PAINLEVEL_OUTOF10: 4
PAINLEVEL_OUTOF10: 4
PAINLEVEL_OUTOF10: 2
PAINLEVEL_OUTOF10: 5
PAINLEVEL_OUTOF10: 2

## 2024-04-11 ASSESSMENT — PAIN DESCRIPTION - PAIN TYPE
TYPE: SURGICAL PAIN;CHRONIC PAIN
TYPE: SURGICAL PAIN;CHRONIC PAIN

## 2024-04-11 ASSESSMENT — PAIN DESCRIPTION - FREQUENCY
FREQUENCY: CONTINUOUS
FREQUENCY: CONTINUOUS

## 2024-04-11 ASSESSMENT — PAIN DESCRIPTION - ORIENTATION
ORIENTATION: LEFT;LOWER
ORIENTATION: POSTERIOR

## 2024-04-11 ASSESSMENT — PAIN - FUNCTIONAL ASSESSMENT
PAIN_FUNCTIONAL_ASSESSMENT: FACE, LEGS, ACTIVITY, CRY, AND CONSOLABILITY (FLACC)
PAIN_FUNCTIONAL_ASSESSMENT: ACTIVITIES ARE NOT PREVENTED

## 2024-04-11 ASSESSMENT — PAIN DESCRIPTION - LOCATION
LOCATION: BACK

## 2024-04-11 ASSESSMENT — PAIN DESCRIPTION - DESCRIPTORS
DESCRIPTORS: ACHING;DISCOMFORT
DESCRIPTORS: SQUEEZING;SPASM
DESCRIPTORS: SPASM;SQUEEZING

## 2024-04-11 ASSESSMENT — PAIN DESCRIPTION - ONSET
ONSET: ON-GOING
ONSET: ON-GOING

## 2024-04-11 NOTE — PROGRESS NOTES
CRITICAL CARE PROGRESS NOTE      Patient:   Alpa Marte    Unit/Bed: 4B-08/008-A  YOB: 1955  MRN:  [id number]   PCP:  Connie Augustin PA  Date of Admission:  3/26/2024    Chief Complaint:  Acute hemorrhage    Assessment and Plan:  Meningitis and bacteremia: Improving.  Leukocytosis peak 4/2, now decreasing.  CSF molecular PCR 4/2 significant for E. coli K1.  Negative streptococci, negative Listeria. 19,780 leukocyte/cc CSF.  Patient encephalopathic beginning 4/2.  No fever, nuchal rigidity. No photophobia.  Empiric vancomycin, ampicillin discontinued 4/3.  Cultures show sensitivity to ceftriaxone. S/p 4 intrathecal doses of gentamicin.  Status post lumbar drain, removed 4/8.  ID following  Neurosurgery following  Ceftriaxone 2g q12h beginning 4/2, 21 day course.  Monitor 4/8 CSF cultures.  NGTD.  Acute left cerebellar hemorrhage: Stable on CT head 4/1. On 3/31, 12 x 7 mm area of acute hemorrhage left cerebellar hemisphere, with bilateral tentorium cerebri hemorrhage noted on MRI.  Neurology signed off, defer to neurosurgery. Wound VAC removed 3/29.  S/p dexamethasone.  Neurosurgery following  PT OT  Up in chair as tolerated  Holding anticoagulation/antiplatelet. Resume per neurosurgery.  Hyponatremia, acute: Patient has low oral intake.  She has had oscillating serum sodiums. 4/8, sodium 128 from prior 134.  Urine sodium 105, likely SIADH, a.m. cortisol drawn approximately 8 AM 4/9 WNL.  Monitor BMP  Acute encephalopathy: Improving.  Secondary to meningitis, possibly lower contribution of urinary retention.  Patient required 3 straight caths 4/2, each draining over 400 cc urine.  Gentamicin has notable side effect of delirium.  Lama catheter  S/p lumbar fusion L3-sacrum: POD 16.  Similar to prior procedures in 1989, 2003 at [facility].  Wound VAC removed 3/29.  Due to CSF leak, lumbar drain by IR on 4/1.  Lumbar drain line kinked on 4/2.  Lumbar CT 4/3 shows some extrathecal gas.   initially showed gram-negative bacteria and leukocytes.  Meningitis PCR then ordered, returns positive for E. coli.  Patient started on ampicillin, vancomycin, ceftriaxone, eventually de-escalated to only ceftriaxone.  Patient began to develop confusion and agitation.  Haldol was used to alleviate patient agitation.  She continues to be quite uncomfortable, ending up kinking her lumbar drain, which needed revision by IR on 4/3.  After onset of antibiotics, lab work began to improve, accompanied by gradual improvement in mentation.      Past 24 Hr Progress:  Patient was interviewed sitting up in bed today.  She was taken to the OR by neurosurgery again for blood patch.  She endorse significant pain relief after this procedure.  Patient was not needing to maximize her pain regimen today.  She is able to converse pleasantly with me at bedside.  She still has some signs of delirium, talking about people who she does not know, and being unsure of some hallucinations.  Infectious disease follows, neurosurgery follows.  Patient acceptable for stepdown.  She had CSF studies taken in OR today.    Scheduled Meds:   sodium chloride flush  5-40 mL IntraVENous 2 times per day    naloxegol  25 mg Oral QAM AC    polyethylene glycol  17 g Oral BID    cefTRIAXone (ROCEPHIN) IV  2,000 mg IntraVENous Q12H    DULoxetine  20 mg Oral Nightly    rOPINIRole  1 mg Oral Nightly    lidocaine  3 patch TransDERmal Daily    senna  2 tablet Oral Nightly    famotidine  20 mg Oral Daily    amLODIPine  5 mg Oral Daily    [Held by provider] gabapentin  600 mg Oral QAM    And    [Held by provider] gabapentin  900 mg Oral Nightly    sodium chloride flush  5-40 mL IntraVENous 2 times per day     Continuous Infusions:   sodium chloride      sodium chloride 0 mL/hr at 04/09/24 1721       PHYSICAL EXAMINATION:  T: 97.8.  P: 93.  RR: 18.  B/P: 108/57.  FiO2: 21%.  O2 Sat: 100%.  I/O: -145cc.  Body mass index is 17.65 kg/m².   O2 delivery:   Room

## 2024-04-11 NOTE — PLAN OF CARE
Problem: Discharge Planning  Goal: Discharge to home or other facility with appropriate resources  Outcome: Progressing  Flowsheets (Taken 4/10/2024 2140)  Discharge to home or other facility with appropriate resources:   Identify barriers to discharge with patient and caregiver   Identify discharge learning needs (meds, wound care, etc)   Refer to discharge planning if patient needs post-hospital services based on physician order or complex needs related to functional status, cognitive ability or social support system   Arrange for needed discharge resources and transportation as appropriate   Arrange for interpreters to assist at discharge as needed     Problem: Pain  Goal: Verbalizes/displays adequate comfort level or baseline comfort level  Outcome: Progressing  Flowsheets (Taken 4/10/2024 2140)  Verbalizes/displays adequate comfort level or baseline comfort level:   Encourage patient to monitor pain and request assistance   Administer analgesics based on type and severity of pain and evaluate response   Consider cultural and social influences on pain and pain management   Assess pain using appropriate pain scale   Implement non-pharmacological measures as appropriate and evaluate response   Notify Licensed Independent Practitioner if interventions unsuccessful or patient reports new pain     Problem: ABCDS Injury Assessment  Goal: Absence of physical injury  Outcome: Progressing  Flowsheets (Taken 4/9/2024 2000 by Sukhi Renteria, RN)  Absence of Physical Injury: Implement safety measures based on patient assessment     Problem: Safety - Adult  Goal: Free from fall injury  Outcome: Progressing  Flowsheets (Taken 4/10/2024 2140)  Free From Fall Injury: Instruct family/caregiver on patient safety     Problem: Nutrition Deficit:  Goal: Optimize nutritional status  Outcome: Progressing  Flowsheets (Taken 4/10/2024 2140)  Nutrient intake appropriate for improving, restoring, or maintaining nutritional needs:    surrounding tissue     Problem: Safety - Medical Restraint  Goal: Remains free of injury from restraints (Restraint for Interference with Medical Device)  Description: INTERVENTIONS:  1. Determine that other, less restrictive measures have been tried or would not be effective before applying the restraint  2. Evaluate the patient's condition at the time of restraint application  3. Inform patient/family regarding the reason for restraint  4. Q2H: Monitor safety, psychosocial status, comfort, nutrition and hydration  Outcome: Progressing  Flowsheets (Taken 4/10/2024 2140)  Remains free of injury from restraints (restraint for interference with medical device):   Determine that other, less restrictive measures have been tried or would not be effective before applying the restraint   Every 2 hours: Monitor safety, psychosocial status, comfort, nutrition and hydration     Care plan reviewed with patient.  Patient verbalize understanding of the plan of care and contribute to goal setting.

## 2024-04-11 NOTE — BRIEF OP NOTE
Brief Postoperative Note      Patient: Alpa Marte  YOB: 1955  MRN: [id number]    Date of Procedure: 4/11/2024    Pre-Op Diagnosis Codes:     * Status post lumbar spinal fusion [Z98.1]    Post-Op Diagnosis: Same       Procedure(s):  Lumbar wound re-exploration and revision for dural repair of CSF leak    Surgeon(s):  Charlie Caldera MD    Assistant:  Physician Assistant: Santino Stewart PA-C    Anesthesia: General    Estimated Blood Loss (mL): less than 50     Complications: None    Specimens:   ID Type Source Tests Collected by Time Destination   1 : Lumbar fluid Body Fluid Back CELL COUNT WITH DIFFERENTIAL, BODY FLUID, CULTURE, FUNGUS, GRAM STAIN, CRYSTALS, BODY FLUID, CULTURE, ANAEROBIC AND AEROBIC Charlie Caldera MD 4/11/2024 0959        Implants:  Implant Name Type Inv. Item Serial No.  Lot No. LRB No. Used Action   GRAFT DURA [id number] ULTRAPURE POR SUTURABLE DURAGN - [id number]  GRAFT DURA [id number] ULTRAPURE POR SUTURABLE DURAGN  [employer]- [id number] N/A 3 Implanted         Drains: Hemovac to 1/2 pressure  Closed/Suction Drain Inferior;Left Back Accordion (Active)       Urinary Catheter 04/03/24 Lama (Active)   $ Urethral catheter insertion $ Not inserted for procedure 04/10/24 1700   Catheter Indications Urinary retention (acute or chronic), continuous bladder irrigation or bladder outlet obstruction 04/10/24 2000   Site Assessment No urethral drainage 04/10/24 2000   Urine Color Yellow 04/10/24 2000   Urine Appearance Clear 04/10/24 2000   Urine Odor Malodorous 04/10/24 2000   Collection Container Standard 04/10/24 2000   Securement Method Securing device (Describe) 04/10/24 2000   Catheter Care  Perineal wipes 04/10/24 1228   Catheter Best Practices  Drainage tube clipped to bed;Catheter secured to thigh;Bag below bladder;Bag not on floor;Drainage bag less than half full 04/10/24 1700   Status Draining;Patent 04/10/24 1700   Output (mL) 250 mL 04/11/24 0542      04/02/24 0739   Suction 40 mmgHg continuous 04/02/24 1132       [REMOVED] Lumbar Drain (Removed)   Site Description Leaking at site;Intact;Clean 04/08/24 0800   Dressing Status Clean, dry & intact 04/08/24 0800   Drain Status Open 04/08/24 0800   Drainage System Type Automated 04/08/24 0800   Vset (Flow Rate) mL/hr 15 04/08/24 0800   Units of Pressure mmHG 04/08/24 0800   High Pressure Alarm 45 04/08/24 0800   Pset (Target Pressure) 0 04/08/24 0800   Low Pressure Alarm -2 04/08/24 0800   Pcsf -15 04/08/24 0800   CSF Color Serous 04/08/24 0800   Drain Level (cm) 9.6 cm 04/08/24 0800   Output (ml) 0.3 ml 04/07/24 2300       Findings:  Infection Present At Time Of Surgery (PATOS) (choose all levels that have infection present):  No infection present  Other Findings: Post reexploration revision of lumbar fusion for duraplasty    Electronically signed by Santino Stewart PA-C on 4/11/2024 at 11:42 AM

## 2024-04-11 NOTE — ANESTHESIA POSTPROCEDURE EVALUATION
Department of Anesthesiology  Postprocedure Note    Patient: Alpa Marte  MRN: [id number]  YOB: 1955  Date of evaluation: 4/11/2024    Procedure Summary       Date: 04/11/24 [facility]    Anesthesia Start: 0913 Anesthesia Stop: 1146    Procedure: Lumbar wound re-exploration and revision for dural repair of CSF leak (Spine Lumbar) Diagnosis:       Status post lumbar spinal fusion      (Status post lumbar spinal fusion [Z98.1])    Surgeons: Charlie Caldera MD Responsible Provider: Balaji Swift DO    Anesthesia Type: general ASA Status: 3            Anesthesia Type: No value filed.    Katharina Phase I: Katharina Score: 8    Katharina Phase II:      Anesthesia Post Evaluation    Comments: [facility]  POST-ANESTHESIA NOTE       Name:  Alpa Marte                                         Age:  68 y.o.  MRN:  [id number]      Last Vitals:  /71   Pulse 93   Temp (!) 96.2 °F (35.7 °C) (Axillary)   Resp 20   Ht 1.651 m (5' 5\")   Wt 48.1 kg (106 lb 0.7 oz)   SpO2 100%   BMI 17.65 kg/m²   Patient Vitals in the past 4 hrs:  04/11/24 1230, BP:137/71, Temp:(!) 96.2 °F (35.7 °C), Temp src:Axillary, Pulse:93, Resp:20, SpO2:100 %  04/11/24 1205, BP:129/88, Pulse:89, Resp:19, SpO2:100 %  04/11/24 1200, BP:122/79, Pulse:85, Resp:17, SpO2:98 %  04/11/24 1155, BP:119/80, Pulse:87, Resp:16, SpO2:100 %  04/11/24 1150, BP:134/78, Pulse:85, Resp:19, SpO2:98 %  04/11/24 1145, BP:(!) 129/58, Pulse:88, Resp:22, SpO2:93 %  04/11/24 1141, BP:134/78, Temp:97.2 °F (36.2 °C), Temp src:Temporal, Pulse:84, Resp:22, SpO2:98 %    Level of Consciousness:  Awake    Respiratory:  Stable    Oxygen Saturation:  Stable    Cardiovascular:  Stable    Hydration:  Adequate    PONV:  Stable    Post-op Pain:  Adequate analgesia    Post-op Assessment:  No apparent anesthetic complications    Additional Follow-Up / Treatment / Comment:  None    Balaji Swift DO  April 11, 2024   12:41 PM      No notable

## 2024-04-11 NOTE — OP NOTE
[facility]  RECORD OF OPERATION    Patient name: Alpa Marte  Medical Record Number: [id number]  Account Number: [id number]      Date of Procedure: 4/11/2024    Pre-operative Diagnosis: ***  Post-operative Diagnosis: CSF leak from small dural laceration.      PROCEDURE:      Lumbar wound exploration.  Revision and re do) previous dural laceration repair (unplanned in the previous surgery).   Application of large fat graft harvested from the subcutaneous tissue of the same wound and applied to the dura at the location of laceration.  Evacuation of epidural fluid (seroma) collection.   Using the surgical microscope.     Anesthesia: General endotracheal     Surgeon: Charlie Caldera MD  Assistants: Santino Stewart PA-C      Estimated Blood Loss: minimal      Drains: None     Blood Transfusions: None      Complications: none immediately appreciated     Specimens: Epidural fluid send for cultures      Findings:     CSF leak approximally at the level of L4-L5 around the previous dura laceration repair sutures. Some of the previous dural stitches were loosen with CSF leak between the stitches.  New dura stitches were applied under the microscope and supported with large fat graft     Indications for Procedure:      (Please see my separate progress note from today for more information).     This is a 58-year-old male who underwent lumbar spine surgery on 2/28/2024 in the form of posterior lumbar spine decompression and fusion from L4 to the pelvis. During the surgery, because of strong adhesions between the dura and the bone due to previous laminectomy, a small dural laceration was encountered. The dural laceration was repaired primarily under the microscope vision by utilizing dural sheath stitches and application of small muscle graft.   Patient did well after that surgery initially however over hearing admitted again 2 days ago because of concern of CSF leak.  Based on patient's symptoms and the new  however wound started to leak CSF. patient wound continued to tissue or concern for possible CSF leak despite lumbar drain placement also patient developed a sign of meningitis and wound infection.  Based on patient's symptoms and the new lumbar spine MRI, I recommended for the patient surgical intervention in the form of of reexploration of lumbar spine wound of previous L3-Pelvis, seroma evacuation and  dural laceration repair\".   Above recommended surgical intervention was discussed with patient and her family in detail in front of the neurosurgery PA's(Jason).  I Discussed with them the surgery risks and benefits.  I discussed with them also the possible need for another surgical intervention.  All questions and concerns were addressed and answered.  Patient agreed to undergo the recommended surgical intervention and he signed the surgical consent.       Intraoperative findings:    Successful repair of multiple dural/pseudomeningocele lacerations utilizing a microsurgical techniques and multiple DuraGen grafts         PROCEDURE:      The patient was brought to the OR, whereshe was placed  supine initially on the operating table. Then general anesthesia was  Inducted. IV line, A line, Lama catheter were inserted and maintained. Next, we  flipped on Aron in prone position. Next, we prepped and draped the patient in a standard fashion. Then, we open the old skin incision. We removed the old suture all the way to reach the dural. We found a significant felid collection between the muscles at dural. The fluid was more like between serosanguineous to bloody.  I send some this fluid to to cultures.  Then, I bought the microscope we found a clear CSF leak from  the previous dura lacerations repairing sutures.  Also I found new CSF leak location corresponding to the location of the entry point of the previous lumbar drain.  Some of the previous dural stitches were loosen with CSF leak between the

## 2024-04-11 NOTE — ANESTHESIA PRE PROCEDURE
BUN 16 04/11/2024 06:45 AM    CREATININE 0.4 04/11/2024 06:45 AM    LABGLOM >90 04/11/2024 06:45 AM    GLUCOSE 118 04/11/2024 06:45 AM    PROT 5.2 04/06/2024 03:59 AM    CALCIUM 8.5 04/11/2024 06:45 AM    BILITOT 0.5 04/06/2024 03:59 AM    ALKPHOS 63 04/06/2024 03:59 AM    AST 21 04/06/2024 03:59 AM    ALT 32 04/06/2024 03:59 AM       POC Tests: No results for input(s): \"POCGLU\", \"POCNA\", \"POCK\", \"POCCL\", \"POCBUN\", \"POCHEMO\", \"POCHCT\" in the last 72 hours.    Coags:   Lab Results   Component Value Date/Time    INR 1.07 04/10/2024 02:22 PM    APTT 32.8 04/10/2024 02:22 PM       HCG (If Applicable): No results found for: \"PREGTESTUR\", \"PREGSERUM\", \"HCG\", \"HCGQUANT\"     ABGs: No results found for: \"PHART\", \"PO2ART\", \"SKR1KKT\", \"HMW1TJD\", \"BEART\", \"E6NYCMDS\"     Type & Screen (If Applicable):  Lab Results   Component Value Date    LABRH NEG 03/26/2024       Drug/Infectious Status (If Applicable):  No results found for: \"HIV\", \"HEPCAB\"    COVID-19 Screening (If Applicable): No results found for: \"COVID19\"        Anesthesia Evaluation  Patient summary reviewed  Airway: Mallampati: II  TM distance: >3 FB   Neck ROM: full  Mouth opening: > = 3 FB   Dental:          Pulmonary:                              Cardiovascular:          ECG reviewed                        Neuro/Psych:   (+) neuromuscular disease:, psychiatric history:             ROS comment: meningitis GI/Hepatic/Renal:             Endo/Other:                     Abdominal:             Vascular:          Other Findings:       Anesthesia Plan      general     ASA 3       Induction: intravenous.    MIPS: Postoperative opioids intended and Prophylactic antiemetics administered.  Anesthetic plan and risks discussed with patient and healthcare power of .      Plan discussed with CRNA.                Balaji Swift,    4/11/2024

## 2024-04-11 NOTE — PROGRESS NOTES
[facility]  SPEECH THERAPY MISSED TREATMENT NOTE  [facility] OR      Date: 2024  Patient Name: Alpa Marte        MRN: [id number]    : 1955  (68 y.o.)    REASON FOR MISSED TREATMENT:  Attempted to see patient for treatment at 1005, but she was in the OR for a procedure.  Will check back at a later date/time as able.    Alpa Zhou M.S. CCC-SLP [id number]

## 2024-04-11 NOTE — PROGRESS NOTES
Progress note: Infectious diseases    Patient - Alpa Marte,  Age - 68 y.o.    - 1955      Room Number - 4B-05/005-A   MRN -  [id number]   [facility] # - [id number]  Date of Admission -  3/26/2024  7:58 AM    SUBJECTIVE:    No new issues. She had surgery  OBJECTIVE   VITALS    height is 1.651 m (5' 5\") and weight is 48.1 kg (106 lb 0.7 oz). Her axillary temperature is 96.2 °F (35.7 °C) (abnormal). Her blood pressure is 137/71 and her pulse is 93. Her respiration is 20 and oxygen saturation is 100%.       Wt Readings from Last 3 Encounters:   04/10/24 48.1 kg (106 lb 0.7 oz)   24 48.6 kg (107 lb 2.3 oz)   01/15/24 48.6 kg (107 lb 3.2 oz)       I/O (24 Hours)    Intake/Output Summary (Last 24 hours) at 2024 1638  Last data filed at 2024 1622  Gross per 24 hour   Intake 2584 ml   Output 900 ml   Net 1684 ml         General Appearance awake  HEENT - normocephalic, atraumatic, pink conjunctiva,  anicteric sclera  Neck - Supple, no mass  Lungs -  Bilateral   air entry, no rhonchi, no wheeze  Cardiovascular - Heart sounds are normal.  Regular rate and rhythm without murmur, gallop or rub.  Abdomen - soft, not distended, nontender.     Neurologic -awake  Skin - No bruising or bleeding  Extremities - No edema, no cyanosis, clubbing   Dressed lumbar wound.(Post surgery)    MEDICATIONS:      sodium chloride flush  5-40 mL IntraVENous 2 times per day    naloxegol  25 mg Oral QAM AC    polyethylene glycol  17 g Oral BID    cefTRIAXone (ROCEPHIN) IV  2,000 mg IntraVENous Q12H    DULoxetine  20 mg Oral Nightly    rOPINIRole  1 mg Oral Nightly    lidocaine  3 patch TransDERmal Daily    senna  2 tablet Oral Nightly    famotidine  20 mg Oral Daily    amLODIPine  5 mg Oral Daily    [Held by provider] gabapentin  600 mg Oral QAM    And    [Held by provider] gabapentin  900 mg Oral Nightly    sodium chloride flush  5-40 mL  IntraVENous 2 times per day      sodium chloride      sodium chloride 0 mL/hr at 04/09/24 1721     sodium chloride flush, sodium chloride, morphine **OR** morphine, ketorolac, potassium chloride **OR** potassium alternative oral replacement **OR** potassium chloride, HYDROmorphone **OR** HYDROmorphone, oxyCODONE, acetaminophen, naloxone, [Held by provider] HYDROcodone 5 mg - acetaminophen **OR** [Held by provider] HYDROcodone 5 mg - acetaminophen, hydrALAZINE, ondansetron, labetalol, sodium chloride flush, sodium chloride, cyclobenzaprine      LABS:     CBC:   Recent Labs     04/09/24  0818 04/10/24  0314 04/11/24  0645   WBC 15.5* 21.4* 26.9*   HGB 10.4* 10.7* 10.7*    282 334       BMP:    Recent Labs     04/09/24  0818 04/10/24  0314 04/11/24  0645   * 131* 134*   K 3.9 3.8 3.9   CL 98 96* 95*   CO2 23 25 25   BUN 15 21 16   CREATININE 0.4 0.3* 0.4   GLUCOSE 98 120* 118*       Calcium:  Recent Labs     04/11/24  0645   CALCIUM 8.5       Ionized Calcium:No results for input(s): \"IONCA\" in the last 72 hours.  Magnesium:  Recent Labs     04/11/24  0645   MG 1.9              Problem list of patient:     Patient Active Problem List   Diagnosis Code    S/P lumbar fusion Z98.1    Moderate malnutrition (HCC) E44.0    Transient alteration of awareness R40.4    Intracranial bleeding (HCC) I62.9    Cerebellar hemorrhage (HCC) I61.4    Severe malnutrition (HCC) E43    Meningitis G03.9    Post-op pain G89.18         ASSESSMENT/PLAN   E. coli bacteremia with meningitis: Continue Rocephin every 12 hours.  Recent back surgery complicated with a dural leak:  she had revision with dura repair today    Jag Melendez MD, 4/11/2024 4:38 PM

## 2024-04-11 NOTE — PROGRESS NOTES
Patient informed and educated by this nurse regarding order for NG tube. Patient states \"absolutely not\". This nurse explains risks of not having NG tube placed and reasons for NG tube. Patient continues to refuse. Santino Stewart notified.

## 2024-04-11 NOTE — CARE COORDINATION
4/11/24, 1:25 PM EDT    DISCHARGE PLANNING EVALUATION    Received a SW consult for \"LTAC needed at d/c\".  does not set up LTACH placement, however per SHERYL Riley's note, a [facility] referral has already been made after confirming that patient's brother is agreeable to [facility].

## 2024-04-11 NOTE — PROGRESS NOTES
1141: pt arrives to pacu. Pt on 4L nasal cannula. Pt opens eyes to verbal stimulation. Pt not following commands. Pt very jittery- moving hands and feet. [place] warmer placed on pt. Lama in place. Hemovac drain half compressed  1147: pt very jittery. Pt reassured and redirected  1153: pt resting in bed  1200: pt resting in bed with eyes closed. Pt lying still   1210: report called to  JAMES Preciado  1211: pt meets discharge criteria from pacu  1212: pt following commands- pt not communicating verbally   1215: pt transported to

## 2024-04-12 ENCOUNTER — APPOINTMENT (OUTPATIENT)
Dept: GENERAL RADIOLOGY | Age: 69
End: 2024-04-12
Attending: NEUROLOGICAL SURGERY
Payer: MEDICARE

## 2024-04-12 LAB
ANION GAP SERPL CALC-SCNC: 18 MEQ/L (ref 8–16)
BASOPHILS ABSOLUTE: 0 THOU/MM3 (ref 0–0.1)
BASOPHILS NFR BLD AUTO: 0.2 %
BUN SERPL-MCNC: 15 MG/DL (ref 7–22)
CALCIUM SERPL-MCNC: 8.3 MG/DL (ref 8.5–10.5)
CHARACTER, CSF: ABNORMAL
CHLORIDE SERPL-SCNC: 99 MEQ/L (ref 98–111)
CO2 SERPL-SCNC: 18 MEQ/L (ref 23–33)
COLOR CSF: YELLOW
CREAT SERPL-MCNC: 0.3 MG/DL (ref 0.4–1.2)
CRYSTALS FLD MICRO: NORMAL
DEPRECATED RDW RBC AUTO: 48.9 FL (ref 35–45)
EOSINOPHIL NFR BLD AUTO: 0 %
EOSINOPHILS ABSOLUTE: 0 THOU/MM3 (ref 0–0.4)
ERYTHROCYTE [DISTWIDTH] IN BLOOD BY AUTOMATED COUNT: 13.2 % (ref 11.5–14.5)
GFR SERPL CREATININE-BSD FRML MDRD: > 90 ML/MIN/1.73M2
GLUCOSE SERPL-MCNC: 105 MG/DL (ref 70–108)
HCT VFR BLD AUTO: 30 % (ref 37–47)
HGB BLD-MCNC: 9.9 GM/DL (ref 12–16)
IMM GRANULOCYTES # BLD AUTO: 0.13 THOU/MM3 (ref 0–0.07)
IMM GRANULOCYTES NFR BLD AUTO: 0.7 %
LYMPHOCYTES ABSOLUTE: 1 THOU/MM3 (ref 1–4.8)
LYMPHOCYTES NFR BLD AUTO: 5.1 %
LYMPHOCYTES NFR CSF MANUAL: 1 % (ref 0–90)
MAGNESIUM SERPL-MCNC: 1.8 MG/DL (ref 1.6–2.4)
MCH RBC QN AUTO: 33.6 PG (ref 26–33)
MCHC RBC AUTO-ENTMCNC: 33 GM/DL (ref 32.2–35.5)
MCV RBC AUTO: 101.7 FL (ref 81–99)
MONOCYTES ABSOLUTE: 0.8 THOU/MM3 (ref 0.4–1.3)
MONOCYTES NFR BLD AUTO: 4.1 %
MONOS+MACROS NFR CSF MANUAL: 2 % (ref 0–45)
NEUTROPHILS NFR BLD AUTO: 89.9 %
NEUTS SEG NFR CSF MANUAL: 97 % (ref 0–6)
NRBC BLD AUTO-RTO: 0 /100 WBC
NUC CELL # FLD AUTO: ABNORMAL /CUMM (ref 0–5)
PATHOLOGIST REVIEW: ABNORMAL
PLATELET # BLD AUTO: 315 THOU/MM3 (ref 130–400)
PMV BLD AUTO: 10.5 FL (ref 9.4–12.4)
POTASSIUM SERPL-SCNC: 4.2 MEQ/L (ref 3.5–5.2)
RBC # BLD AUTO: 2.95 MILL/MM3 (ref 4.2–5.4)
RBC # CSF MANUAL: ABNORMAL /CUMM
SEGMENTED NEUTROPHILS ABSOLUTE COUNT: 17.7 THOU/MM3 (ref 1.8–7.7)
SODIUM SERPL-SCNC: 135 MEQ/L (ref 135–145)
TUBE VOLUME RECEIVED CSF: 10 ML
WBC # BLD AUTO: 19.7 THOU/MM3 (ref 4.8–10.8)

## 2024-04-12 PROCEDURE — 99232 SBSQ HOSP IP/OBS MODERATE 35: CPT | Performed by: INTERNAL MEDICINE

## 2024-04-12 PROCEDURE — 83735 ASSAY OF MAGNESIUM: CPT

## 2024-04-12 PROCEDURE — 6370000000 HC RX 637 (ALT 250 FOR IP): Performed by: PHYSICIAN ASSISTANT

## 2024-04-12 PROCEDURE — 6360000002 HC RX W HCPCS: Performed by: PHYSICIAN ASSISTANT

## 2024-04-12 PROCEDURE — 85025 COMPLETE CBC W/AUTO DIFF WBC: CPT

## 2024-04-12 PROCEDURE — 36415 COLL VENOUS BLD VENIPUNCTURE: CPT

## 2024-04-12 PROCEDURE — 6360000002 HC RX W HCPCS

## 2024-04-12 PROCEDURE — 2580000003 HC RX 258: Performed by: PHYSICIAN ASSISTANT

## 2024-04-12 PROCEDURE — 6370000000 HC RX 637 (ALT 250 FOR IP): Performed by: INTERNAL MEDICINE

## 2024-04-12 PROCEDURE — 2060000000 HC ICU INTERMEDIATE R&B

## 2024-04-12 PROCEDURE — 80048 BASIC METABOLIC PNL TOTAL CA: CPT

## 2024-04-12 PROCEDURE — APPSS30 APP SPLIT SHARED TIME 16-30 MINUTES: Performed by: PHYSICIAN ASSISTANT

## 2024-04-12 PROCEDURE — 6370000000 HC RX 637 (ALT 250 FOR IP)

## 2024-04-12 PROCEDURE — 74018 RADEX ABDOMEN 1 VIEW: CPT

## 2024-04-12 RX ORDER — BACLOFEN 10 MG/1
10 TABLET ORAL 3 TIMES DAILY
Status: DISCONTINUED | OUTPATIENT
Start: 2024-04-12 | End: 2024-04-19 | Stop reason: HOSPADM

## 2024-04-12 RX ORDER — LORAZEPAM 2 MG/ML
1 INJECTION INTRAMUSCULAR ONCE
Status: COMPLETED | OUTPATIENT
Start: 2024-04-12 | End: 2024-04-12

## 2024-04-12 RX ORDER — DOCUSATE SODIUM 100 MG/1
100 CAPSULE, LIQUID FILLED ORAL DAILY
Status: DISCONTINUED | OUTPATIENT
Start: 2024-04-12 | End: 2024-04-19 | Stop reason: HOSPADM

## 2024-04-12 RX ADMIN — SODIUM CHLORIDE, PRESERVATIVE FREE 10 ML: 5 INJECTION INTRAVENOUS at 21:03

## 2024-04-12 RX ADMIN — OXYCODONE 5 MG: 5 TABLET ORAL at 21:00

## 2024-04-12 RX ADMIN — SODIUM CHLORIDE, PRESERVATIVE FREE 10 ML: 5 INJECTION INTRAVENOUS at 08:48

## 2024-04-12 RX ADMIN — FAMOTIDINE 20 MG: 20 TABLET, FILM COATED ORAL at 08:45

## 2024-04-12 RX ADMIN — AMLODIPINE BESYLATE 5 MG: 5 TABLET ORAL at 08:46

## 2024-04-12 RX ADMIN — POLYETHYLENE GLYCOL 3350 17 G: 17 POWDER, FOR SOLUTION ORAL at 21:00

## 2024-04-12 RX ADMIN — SENNOSIDES 17.2 MG: 8.6 TABLET ORAL at 21:04

## 2024-04-12 RX ADMIN — CEFTRIAXONE SODIUM 2000 MG: 2 INJECTION, POWDER, FOR SOLUTION INTRAMUSCULAR; INTRAVENOUS at 16:19

## 2024-04-12 RX ADMIN — HYDROMORPHONE HYDROCHLORIDE 1 MG: 1 INJECTION, SOLUTION INTRAMUSCULAR; INTRAVENOUS; SUBCUTANEOUS at 08:45

## 2024-04-12 RX ADMIN — SODIUM CHLORIDE, PRESERVATIVE FREE 10 ML: 5 INJECTION INTRAVENOUS at 21:04

## 2024-04-12 RX ADMIN — OXYCODONE 5 MG: 5 TABLET ORAL at 16:04

## 2024-04-12 RX ADMIN — CYCLOBENZAPRINE 10 MG: 10 TABLET, FILM COATED ORAL at 06:40

## 2024-04-12 RX ADMIN — BACLOFEN 10 MG: 10 TABLET ORAL at 17:53

## 2024-04-12 RX ADMIN — LORAZEPAM 1 MG: 2 INJECTION INTRAMUSCULAR; INTRAVENOUS at 22:22

## 2024-04-12 RX ADMIN — NALOXEGOL OXALATE 25 MG: 25 TABLET, FILM COATED ORAL at 05:19

## 2024-04-12 RX ADMIN — HYDROMORPHONE HYDROCHLORIDE 0.5 MG: 1 INJECTION, SOLUTION INTRAMUSCULAR; INTRAVENOUS; SUBCUTANEOUS at 16:28

## 2024-04-12 RX ADMIN — POLYETHYLENE GLYCOL 3350 17 G: 17 POWDER, FOR SOLUTION ORAL at 08:45

## 2024-04-12 RX ADMIN — SODIUM CHLORIDE, PRESERVATIVE FREE 10 ML: 5 INJECTION INTRAVENOUS at 16:50

## 2024-04-12 RX ADMIN — DOCUSATE SODIUM 100 MG: 100 CAPSULE, LIQUID FILLED ORAL at 11:44

## 2024-04-12 RX ADMIN — BACLOFEN 10 MG: 10 TABLET ORAL at 21:03

## 2024-04-12 RX ADMIN — CEFTRIAXONE SODIUM 2000 MG: 2 INJECTION, POWDER, FOR SOLUTION INTRAMUSCULAR; INTRAVENOUS at 05:17

## 2024-04-12 RX ADMIN — OXYCODONE 5 MG: 5 TABLET ORAL at 06:40

## 2024-04-12 RX ADMIN — ROPINIROLE HYDROCHLORIDE 1 MG: 1 TABLET, FILM COATED ORAL at 21:02

## 2024-04-12 RX ADMIN — DULOXETINE HYDROCHLORIDE 20 MG: 20 CAPSULE, DELAYED RELEASE ORAL at 21:02

## 2024-04-12 ASSESSMENT — PAIN DESCRIPTION - ORIENTATION: ORIENTATION: RIGHT

## 2024-04-12 ASSESSMENT — PAIN DESCRIPTION - DESCRIPTORS
DESCRIPTORS: SHARP;PRESSURE;SPASM
DESCRIPTORS: SHOOTING;SHARP
DESCRIPTORS: SHARP
DESCRIPTORS: SHOOTING;SHARP;SPASM

## 2024-04-12 ASSESSMENT — PAIN DESCRIPTION - LOCATION
LOCATION: LEG;BACK
LOCATION: OTHER (COMMENT)
LOCATION: LEG;BACK

## 2024-04-12 ASSESSMENT — PAIN SCALES - GENERAL
PAINLEVEL_OUTOF10: 6
PAINLEVEL_OUTOF10: 9
PAINLEVEL_OUTOF10: 6
PAINLEVEL_OUTOF10: 7
PAINLEVEL_OUTOF10: 6

## 2024-04-12 ASSESSMENT — PAIN - FUNCTIONAL ASSESSMENT
PAIN_FUNCTIONAL_ASSESSMENT: PREVENTS OR INTERFERES SOME ACTIVE ACTIVITIES AND ADLS
PAIN_FUNCTIONAL_ASSESSMENT: PREVENTS OR INTERFERES WITH MANY ACTIVE NOT PASSIVE ACTIVITIES

## 2024-04-12 NOTE — CARE COORDINATION
4/12/24, 1:20 PM EDT    DISCHARGE ON GOING EVALUATION    [facility] day: 17  Location: -08/008-A Reason for admit: Adjacent segment disease of lumbar spine with history of fusion procedure [M51.36, Z98.1]  S/P lumbar fusion [Z98.1]  Meningitis [G03.9]     Procedures:   3/26 Reexploration and Revision of Lumbar Instrumented Fusion for Extension of the Decompression and Instrumentation to L3-Pelvis    3/29 lumbar drain removed  4/1 Lumbar CSF drain placed  4/3 CSF Lumbar drain replaced in IR  4/8 Lumbar drain removed  4/11 Lumbar wound re-exploration and revision for dural repair of CSF leak   Imaging since last note:   KUB ordered    Barriers to Discharge: POD 1. Await BM. IV rocephin q 12. Pain control.     PCP: Connie Augustin PA  Readmission Risk Score: 13.4%    Patient Goals/Plan/Treatment Preferences: Spoke with Dr. Felder via perfect serve, okay to start [facility] precert. [facility] updated.

## 2024-04-12 NOTE — PROGRESS NOTES
[facility]  PHYSICAL THERAPY MISSED TREATMENT NOTE  [facility] CVICU 4B    Date: 2024  Patient Name: Alpa Marte        MRN: [id number]   : 1955  (68 y.o.)  Gender: female   Referring Practitioner: Santino Stewart PA-C  Diagnosis: Adjacent segment disease of lumbar spine with history of fusion procedure         REASON FOR MISSED TREATMENT:  Pt is s/p Lumbar wound re-exploration and revision for dural repair of CSF leak on  and has Bedrest order noting \"with head of bed flat x 48 hrs\".  Per this order, pt to be on bedrest until 12:30 on .  PT to check back on  for appropriateness.    Mary Kate Wylie, MPT [id number]

## 2024-04-12 NOTE — PROGRESS NOTES
CRITICAL CARE PROGRESS NOTE      Patient:   Alpa Marte    Unit/Bed: 4B-08/008-A  YOB: 1955  MRN:  [id number]   PCP:  Connie Augustin PA  Date of Admission:  3/26/2024    Chief Complaint:  Acute hemorrhage    Assessment and Plan:  Meningitis and bacteremia: Improving.  Leukocytosis peak 4/2, now decreasing.  CSF molecular PCR 4/2 significant for E. coli K1.  Negative streptococci, negative Listeria. 19,780 leukocyte/cc CSF.  Patient encephalopathic beginning 4/2.  No fever, nuchal rigidity. No photophobia.  Empiric vancomycin, ampicillin discontinued 4/3.  Cultures show sensitivity to ceftriaxone. S/p 4 intrathecal doses of gentamicin.  Status post lumbar drain, removed 4/8.  ID following  Neurosurgery following  Ceftriaxone 2g q12h beginning 4/2, 21 day course.  Monitor 4/8 CSF cultures.  NGTD.  Acute left cerebellar hemorrhage: Stable on CT head 4/1. On 3/31, 12 x 7 mm area of acute hemorrhage left cerebellar hemisphere, with bilateral tentorium cerebri hemorrhage noted on MRI.  Neurology signed off, defer to neurosurgery. Wound VAC removed 3/29.  S/p dexamethasone.  Neurosurgery following  PT OT  Up in chair as tolerated  Holding anticoagulation/antiplatelet. Resume per neurosurgery.  Hyponatremia, acute, mild: Patient has low oral intake.  Sodium from 4/8-4/11 between 131 134.  She has had oscillating serum sodiums. 4/8, sodium 128 from prior 134.  Urine sodium 105, unlikely SIADH, a.m. cortisol drawn approximately 8 AM 4/9 WNL.  Monitor BMP  Acute encephalopathy: Improving.  Secondary to meningitis, and likely underlying dementia versus schizophreniform-type disorder.   Lama catheter  S/p lumbar fusion L3-sacrum:  Prior lumbar procedures in 1989, 2003 at [facility]. S/p CSF leak, CSF drain.  Most recent revision 4 /11 after duraplasty, drains in place  Neurosurgery following.  Pain management per neurosurgery.  Hypertensive urgency: Resolved.  On no home antihypertensives. BP

## 2024-04-12 NOTE — PROGRESS NOTES
Hospitalist Progress Note      Patient:  Alpa Marte    Unit/Bed:4B-08/008-A  YOB: 1955  MRN: [id number]   Acct: [id number]   PCP: Connie Augustin PA  Date of Admission: 3/26/2024    Assessment/Plan:    #Meningitis and bacteremia.  -CSF molecular PCR 4/2 significant for E. coli K1.  Negative streptococci, negative Listeria. 19,780 leukocyte/cc CSF.  Patient encephalopathic beginning 4/2.    -No fever, nuchal rigidity. No photophobia.    -Empiric vancomycin, ampicillin discontinued 4/3.   - Cultures show sensitivity to ceftriaxone.   -S/p 4 intrathecal doses of gentamicin.    -Status post lumbar drain, removed 4/8.  -ID following.Continue with ceftriaxone.  -Neurosurgery following  -Monitor 4/8 CSF cultures.  NGTD.    #Acute left cerebellar hemorrhage.  - Stable on CT head 4/1.   -On 3/31, 12 x 7 mm area of acute hemorrhage left cerebellar hemisphere, with bilateral tentorium cerebri hemorrhage noted on MRI.    -Neurology signed off, defer to neurosurgery.   -Wound VAC removed 3/29.  S/p dexamethasone.  -Neurosurgery following.Recommended bed rest today.  -PT/ OT following  -Holding anticoagulation/antiplatelet. Resume per neurosurgery.    #Hyponatremia   -Resolved.  -Patient has low oral intake.  Sodium from 4/8-4/11 between 131 134.    -She has had oscillating serum sodiums. 4/8, sodium 128 from prior 134.    -Urine sodium 105, unlikely SIADH, a.m. cortisol drawn approximately 8 AM 4/9 WNL.  -Monitor BMP    #Acute encephalopathy.  -Resolved.    -Secondary to meningitis, and likely underlying dementia versus schizophreniform-type disorder.   -Has Lama catheter.    #S/p lumbar fusion L3-sacrum.  - Prior lumbar procedures in 1989, 2003 at [facility].   -S/p CSF leak, CSF drain.  Most recent revision 4 /11 after duraplasty, drains in place  -Neurosurgery following.  -Pain management per neurosurgery.    #Hypertensive

## 2024-04-12 NOTE — PROGRESS NOTES
bed with head of bed slightly elevated and pain well to moderately controlled.  Dressings remain intact over flat incision with very little staining noted.  Surgical drain is intact and functioning at one half pressure with approximately 230 cc of output.  Purposeful movement x 4, obeys commands appropriately with clear appropriate speech.  Patient denied significant headache on exam this morning.    Vitals: /66   Pulse 92   Temp 98.1 °F (36.7 °C) (Oral)   Resp 18   Ht 1.651 m (5' 5\")   Wt 54.3 kg (119 lb 11.4 oz)   SpO2 99%   BMI 19.92 kg/m²     Physical Exam     Physical Exam:  Some confusion noted this morning with pain well to moderately controlled  Language appropriate, with no aphasia.  Pupils equal.  Facial strength symmetric.    Review of Systems   Baseline confusion precludes a comprehensive review of systems  24 hour intake/output:  Intake/Output Summary (Last 24 hours) at 4/12/2024 0848  Last data filed at 4/12/2024 0550  Gross per 24 hour   Intake 2522.66 ml   Output 1580 ml   Net 942.66 ml     Last 3 weights:  Wt Readings from Last 3 Encounters:   04/12/24 54.3 kg (119 lb 11.4 oz)   03/11/24 48.6 kg (107 lb 2.3 oz)   01/15/24 48.6 kg (107 lb 3.2 oz)         CBC:   Recent Labs     04/10/24  0314 04/11/24  0645 04/12/24  0353   WBC 21.4* 26.9* 19.7*   HGB 10.7* 10.7* 9.9*    334 315     BMP:    Recent Labs     04/10/24  0314 04/11/24  0645 04/12/24  0353   * 134* 135   K 3.8 3.9 4.2   CL 96* 95* 99   CO2 25 25 18*   BUN 21 16 15   CREATININE 0.3* 0.4 0.3*   GLUCOSE 120* 118* 105     Calcium:  Recent Labs     04/12/24  0353   CALCIUM 8.3*     Magnesium:  Recent Labs     04/12/24  0353   MG 1.8     Glucose:No results for input(s): \"POCGLU\" in the last 72 hours.  HgbA1C: No results for input(s): \"LABA1C\" in the last 72 hours.  Lipids: No results for input(s): \"CHOL\", \"TRIG\", \"HDL\", \"LDL\", \"LDLCALC\" in the last 72 hours.    Radiology reports as per the Radiologist  Radiology: XR  mobilization anticipated for tomorrow.  Dressing changes should be performed as needed with pain control a priority.  Infectious disease is following.  Neurosurgery to follow      Electronically signed by Santino Stewart PA-C on 4/12/2024 at 8:48 AM    Neurosurgery

## 2024-04-12 NOTE — PROGRESS NOTES
Comprehensive Nutrition Assessment    Type and Reason for Visit:  Reassess    Nutrition Recommendations/Plan:   No BM documented since admit (17 days) -discussed with RN, consider KUB?, on scheduled bowel meds - discussed with Pharmacy   Consider MVI and ? Appetite Stimulant   ONS: Ensure Plus TID, Magic Cup BID  Nutrition ileus prevention: Activia and Hot Beverage TID  Continue current diet   Patient refusing NG tube currently for supplemental tube feed      Malnutrition Assessment:  Malnutrition Status:  Severe malnutrition (04/02/24 1422)    Context:  Chronic Illness     Findings of the 6 clinical characteristics of malnutrition:  Energy Intake:  75% or less estimated energy requirements for 1 month or longer  Weight Loss:  7.5% over 3 months (8% loss in 3 months from office visit weight of 108# 6.4 oz on 1/4/24)     Body Fat Loss:  Severe body fat loss Triceps, Fat Overlying Ribs, Buccal region, Orbital   Muscle Mass Loss:  Severe muscle mass loss Temples (temporalis), Scapula (trapezius), Clavicles (pectoralis & deltoids), Thigh (quadriceps), Calf (gastrocnemius)  Fluid Accumulation:  No significant fluid accumulation     Strength:  Not Performed    Nutrition Assessment:     Pt. with minimal improvement from a nutritional standpoint AEB meal intake 50-75% with encouragement from staff otherwise intake remains minimal but does like ONS and yogurt.  Remains at risk for further nutritional compromise r/t severe malnutrition, constipation, acute L cerebellar hemorrhage on 3/31, s/p lumbar fusion L3-sacrum- wound vac removed 3/29 and lumbar drain placed by IR 4/1 d/t CSF leak-removed 4/8/24, lumbar wound re-exploration and revision for dural repair of CSF leak 4/11, hypertensive urgency, E. coli bacteremia with meningitis, and underlying medical condition (PMHx: s/p lumbar fusion 1998 and 2003, current smoker).       Nutrition Related Findings:    Pt. Report/Treatments/Miscellaneous: Patient seen and reports  intake of eggs for breakfast, good acceptance of Ensure Plus and Activia, agreeable to Magic Cup, reports does best with soft textures since is without upper dentures, likes cold foods, patient refusing NG tube for supplemental tube feed, note NG tube placed 4/8 however patient pulled out  GI Status: No BM since admit (17 days) - patient reports \"belly rumbling\" and hopes to have BM today, active bowel sounds per RN and reports receiving bowel meds, RN reports will ask Provider regarding possible KUB to check for obstruction    Pertinent Labs: Sodium 135, Potassium 4.2, BUN 15, Creatinine 0.3, glucose 105  Pertinent Meds:  rocephin, pepcid, movantik, glycolax, senokot, prn dilaudid, prn oxycodone      Wound Type: Surgical Incision (lumbar incision - wound vac removed 3/29; lumbar drain 4/1 d/t CSF leak, drain removed 4/8, lumbar wound re-exploration & revision for dural repair of CSF leak 4/11)       Current Nutrition Intake & Therapies:    Average Meal Intake: 1-25%, 26-50%, 51-75% (51-75% with much encouragement from staff)  Average Supplements Intake:  (states she likes ONS)  ADULT ORAL NUTRITION SUPPLEMENT; Breakfast, AM Snack, Lunch, PM Snack, Dinner, HS Snack; Standard High Calorie/High Protein Oral Supplement  ADULT DIET; Regular    Anthropometric Measures:  Height: 165.1 cm (5' 5\")  Ideal Body Weight (IBW): 125 lbs (57 kg)    Admission Body Weight: 46.7 kg (103 lb) (3/26, no edema)  Current Body Weight: 54.3 kg (119 lb 11.4 oz) (4/12; no edema),   IBW. Weight Source: Bed Scale  Current BMI (kg/m2): 19.9  Usual Body Weight:  (per EMR office visit notes: 9/27/22: 118#, 11/7/23: 109# 14.4 oz, 1/4/24: 108# 6.4 oz)     Weight Adjustment For: No Adjustment                 BMI Categories: Underweight (BMI less than 22) age over 65    Estimated Daily Nutrient Needs:  Energy Requirements Based On: Kcal/kg  Weight Used for Energy Requirements: Current (45.1 kg)  Energy (kcal/day): 1579+ kcals (35+ kcals/kg) to promote

## 2024-04-12 NOTE — PLAN OF CARE
Problem: Discharge Planning  Goal: Discharge to home or other facility with appropriate resources  Outcome: Progressing  Flowsheets (Taken 4/10/2024 2140 by Shraddha Phillip, RN)  Discharge to home or other facility with appropriate resources:   Identify barriers to discharge with patient and caregiver   Identify discharge learning needs (meds, wound care, etc)   Refer to discharge planning if patient needs post-hospital services based on physician order or complex needs related to functional status, cognitive ability or social support system   Arrange for needed discharge resources and transportation as appropriate   Arrange for interpreters to assist at discharge as needed     Problem: Pain  Goal: Verbalizes/displays adequate comfort level or baseline comfort level  Outcome: Progressing  Flowsheets (Taken 4/10/2024 2140 by Shraddha Phillip, RN)  Verbalizes/displays adequate comfort level or baseline comfort level:   Encourage patient to monitor pain and request assistance   Administer analgesics based on type and severity of pain and evaluate response   Consider cultural and social influences on pain and pain management   Assess pain using appropriate pain scale   Implement non-pharmacological measures as appropriate and evaluate response   Notify Licensed Independent Practitioner if interventions unsuccessful or patient reports new pain     Problem: ABCDS Injury Assessment  Goal: Absence of physical injury  Outcome: Progressing  Flowsheets (Taken 4/9/2024 2000 by Sukhi Renteria, RN)  Absence of Physical Injury: Implement safety measures based on patient assessment     Problem: Safety - Adult  Goal: Free from fall injury  Outcome: Progressing  Flowsheets (Taken 4/10/2024 2140 by Shraddha Phillip, RN)  Free From Fall Injury: Instruct family/caregiver on patient safety     Problem: Nutrition Deficit:  Goal: Optimize nutritional status  Outcome: Progressing  Flowsheets (Taken 4/11/2024 2232)  Nutrient intake appropriate  Progressing  Flowsheets (Taken 4/11/2024 2232)  Maintains or returns to baseline bowel function:   Assess bowel function   Administer IV fluids as ordered to ensure adequate hydration   Encourage mobilization and activity   Nutrition consult to assist patient with appropriate food choices   Administer ordered medications as needed   Encourage oral fluids to ensure adequate hydration     Problem: Neurosensory - Adult  Goal: Achieves stable or improved neurological status  Outcome: Progressing  Flowsheets (Taken 4/11/2024 2232)  Achieves stable or improved neurological status:   Initiate measures to prevent increased intracranial pressure   Monitor temperature, glucose, and sodium. Initiate appropriate interventions as ordered   Maintain blood pressure and fluid volume within ordered parameters to optimize cerebral perfusion and minimize risk of hemorrhage   Assess for and report changes in neurological status  Goal: Achieves maximal functionality and self care  Outcome: Progressing  Flowsheets (Taken 4/11/2024 2232)  Achieves maximal functionality and self care:   Monitor swallowing and airway patency with patient fatigue and changes in neurological status   Encourage and assist patient to increase activity and self care with guidance from physical therapy/occupational therapy     Problem: Skin/Tissue Integrity - Adult  Goal: Incisions, wounds, or drain sites healing without S/S of infection  Outcome: Progressing  Flowsheets (Taken 4/11/2024 2232)  Incisions, Wounds, or Drain Sites Healing Without Sign and Symptoms of Infection:   ADMISSION and DAILY: Assess and document risk factors for pressure ulcer development   TWICE DAILY: Assess and document skin integrity   TWICE DAILY: Assess and document dressing/incision, wound bed, drain sites and surrounding tissue   Implement wound care per orders   Initiate isolation precautions as appropriate   Initiate pressure ulcer prevention bundle as indicated     Problem: Safety

## 2024-04-12 NOTE — PROGRESS NOTES
[facility]  OCCUPATIONAL THERAPY MISSED TREATMENT NOTE  [facility] CVICU 4B  4B-08/008-A      Date: 2024  Patient Name: Alpa Marte        CSN: [id number]   : 1955  (68 y.o.)  Gender: female   Referring Practitioner: Santino Stewart PA-C  Diagnosis: Adjacent segment disease of lumbar spine with history of fusion procedure         REASON FOR MISSED TREATMENT:  Per neurosx notes and orders, pt to remain on bedrest with HOB flat X48 hours (which would be until 1230 on ). Will follow up as able.

## 2024-04-12 NOTE — PROGRESS NOTES
Progress note: Infectious diseases    Patient - Alpa Marte,  Age - 68 y.o.    - 1955      Room Number - 4B-08/008-A   MRN -  [id number]   [facility] # - [id number]  Date of Admission -  3/26/2024  7:58 AM    SUBJECTIVE:   She has no headache, no drainage from the wound  OBJECTIVE   VITALS    height is 1.651 m (5' 5\") and weight is 54.3 kg (119 lb 11.4 oz). Her oral temperature is 98.3 °F (36.8 °C). Her blood pressure is 124/55 (abnormal) and her pulse is 103 (abnormal). Her respiration is 18 and oxygen saturation is 99%.       Wt Readings from Last 3 Encounters:   24 54.3 kg (119 lb 11.4 oz)   24 48.6 kg (107 lb 2.3 oz)   01/15/24 48.6 kg (107 lb 3.2 oz)       I/O (24 Hours)    Intake/Output Summary (Last 24 hours) at 2024 0829  Last data filed at 2024 0550  Gross per 24 hour   Intake 2522.66 ml   Output 1580 ml   Net 942.66 ml         General Appearance awake  HEENT - normocephalic, atraumatic, pink conjunctiva,  anicteric sclera  Neck - Supple, no mass  Lungs -  Bilateral   air entry, no rhonchi, no wheeze  Cardiovascular - Heart sounds are normal.     Abdomen - soft, not distended, nontender.     Neurologic -awake  Skin - No bruising or bleeding  Extremities - No edema, no cyanosis, clubbing   Dressed lumbar wound.(Post surgery) hemovac in place.    MEDICATIONS:      sodium chloride flush  5-40 mL IntraVENous 2 times per day    naloxegol  25 mg Oral QAM AC    polyethylene glycol  17 g Oral BID    cefTRIAXone (ROCEPHIN) IV  2,000 mg IntraVENous Q12H    DULoxetine  20 mg Oral Nightly    rOPINIRole  1 mg Oral Nightly    lidocaine  3 patch TransDERmal Daily    senna  2 tablet Oral Nightly    famotidine  20 mg Oral Daily    amLODIPine  5 mg Oral Daily    [Held by provider] gabapentin  600 mg Oral QAM    And    [Held by provider] gabapentin  900 mg Oral Nightly    sodium chloride flush  5-40 mL  IntraVENous 2 times per day      sodium chloride      sodium chloride 0 mL/hr at 04/09/24 1721     sodium chloride flush, sodium chloride, morphine **OR** morphine, ketorolac, potassium chloride **OR** potassium alternative oral replacement **OR** potassium chloride, HYDROmorphone **OR** HYDROmorphone, oxyCODONE, acetaminophen, naloxone, [Held by provider] HYDROcodone 5 mg - acetaminophen **OR** [Held by provider] HYDROcodone 5 mg - acetaminophen, hydrALAZINE, ondansetron, labetalol, sodium chloride flush, sodium chloride, cyclobenzaprine      LABS:     CBC:   Recent Labs     04/10/24  0314 04/11/24  0645 04/12/24  0353   WBC 21.4* 26.9* 19.7*   HGB 10.7* 10.7* 9.9*    334 315       BMP:    Recent Labs     04/10/24  0314 04/11/24  0645 04/12/24  0353   * 134* 135   K 3.8 3.9 4.2   CL 96* 95* 99   CO2 25 25 18*   BUN 21 16 15   CREATININE 0.3* 0.4 0.3*   GLUCOSE 120* 118* 105       Calcium:  Recent Labs     04/12/24  0353   CALCIUM 8.3*       Ionized Calcium:No results for input(s): \"IONCA\" in the last 72 hours.  Magnesium:  Recent Labs     04/12/24  0353   MG 1.8              Problem list of patient:     Patient Active Problem List   Diagnosis Code    S/P lumbar fusion Z98.1    Moderate malnutrition (HCC) E44.0    Transient alteration of awareness R40.4    Intracranial bleeding (HCC) I62.9    Cerebellar hemorrhage (HCC) I61.4    Severe malnutrition (HCC) E43    Meningitis G03.9    Post-op pain G89.18         ASSESSMENT/PLAN   E. coli bacteremia with meningitis: Continue Rocephin every 12 hours.  Recent back surgery complicated with a dural leak:  she had revision with dura repair    Continue current treatment    Jag Melendez MD, 4/12/2024 8:29 AM

## 2024-04-13 LAB
ANION GAP SERPL CALC-SCNC: 12 MEQ/L (ref 8–16)
BACTERIA BLD AEROBE CULT: NORMAL
BACTERIA BLD AEROBE CULT: NORMAL
BACTERIA SPEC AEROBE CULT: NORMAL
BACTERIA SPEC ANAEROBE CULT: NORMAL
BASOPHILS ABSOLUTE: 0 THOU/MM3 (ref 0–0.1)
BASOPHILS NFR BLD AUTO: 0.1 %
BUN SERPL-MCNC: 13 MG/DL (ref 7–22)
CALCIUM SERPL-MCNC: 8 MG/DL (ref 8.5–10.5)
CHLORIDE SERPL-SCNC: 96 MEQ/L (ref 98–111)
CO2 SERPL-SCNC: 25 MEQ/L (ref 23–33)
CREAT SERPL-MCNC: 0.3 MG/DL (ref 0.4–1.2)
DEPRECATED RDW RBC AUTO: 46.4 FL (ref 35–45)
EOSINOPHIL NFR BLD AUTO: 0.6 %
EOSINOPHILS ABSOLUTE: 0.1 THOU/MM3 (ref 0–0.4)
ERYTHROCYTE [DISTWIDTH] IN BLOOD BY AUTOMATED COUNT: 13.2 % (ref 11.5–14.5)
GFR SERPL CREATININE-BSD FRML MDRD: > 90 ML/MIN/1.73M2
GLUCOSE SERPL-MCNC: 87 MG/DL (ref 70–108)
GRAM STN SPEC: NORMAL
HCT VFR BLD AUTO: 29.1 % (ref 37–47)
HGB BLD-MCNC: 10.1 GM/DL (ref 12–16)
IMM GRANULOCYTES # BLD AUTO: 0.06 THOU/MM3 (ref 0–0.07)
IMM GRANULOCYTES NFR BLD AUTO: 0.4 %
LYMPHOCYTES ABSOLUTE: 1.8 THOU/MM3 (ref 1–4.8)
LYMPHOCYTES NFR BLD AUTO: 13.5 %
MAGNESIUM SERPL-MCNC: 2 MG/DL (ref 1.6–2.4)
MCH RBC QN AUTO: 33.6 PG (ref 26–33)
MCHC RBC AUTO-ENTMCNC: 34.7 GM/DL (ref 32.2–35.5)
MCV RBC AUTO: 96.7 FL (ref 81–99)
MONOCYTES ABSOLUTE: 1.1 THOU/MM3 (ref 0.4–1.3)
MONOCYTES NFR BLD AUTO: 8.3 %
NEUTROPHILS NFR BLD AUTO: 77.1 %
NRBC BLD AUTO-RTO: 0 /100 WBC
PLATELET # BLD AUTO: 334 THOU/MM3 (ref 130–400)
PMV BLD AUTO: 10.9 FL (ref 9.4–12.4)
POTASSIUM SERPL-SCNC: 3.5 MEQ/L (ref 3.5–5.2)
RBC # BLD AUTO: 3.01 MILL/MM3 (ref 4.2–5.4)
SEGMENTED NEUTROPHILS ABSOLUTE COUNT: 10.6 THOU/MM3 (ref 1.8–7.7)
SODIUM SERPL-SCNC: 133 MEQ/L (ref 135–145)
WBC # BLD AUTO: 13.7 THOU/MM3 (ref 4.8–10.8)

## 2024-04-13 PROCEDURE — 6370000000 HC RX 637 (ALT 250 FOR IP)

## 2024-04-13 PROCEDURE — 6360000002 HC RX W HCPCS: Performed by: PHYSICIAN ASSISTANT

## 2024-04-13 PROCEDURE — 99222 1ST HOSP IP/OBS MODERATE 55: CPT | Performed by: INTERNAL MEDICINE

## 2024-04-13 PROCEDURE — 6370000000 HC RX 637 (ALT 250 FOR IP): Performed by: INTERNAL MEDICINE

## 2024-04-13 PROCEDURE — 6370000000 HC RX 637 (ALT 250 FOR IP): Performed by: PHYSICIAN ASSISTANT

## 2024-04-13 PROCEDURE — 85025 COMPLETE CBC W/AUTO DIFF WBC: CPT

## 2024-04-13 PROCEDURE — 2580000003 HC RX 258: Performed by: PHYSICIAN ASSISTANT

## 2024-04-13 PROCEDURE — 83735 ASSAY OF MAGNESIUM: CPT

## 2024-04-13 PROCEDURE — 2060000000 HC ICU INTERMEDIATE R&B

## 2024-04-13 PROCEDURE — 36415 COLL VENOUS BLD VENIPUNCTURE: CPT

## 2024-04-13 PROCEDURE — 80048 BASIC METABOLIC PNL TOTAL CA: CPT

## 2024-04-13 RX ADMIN — NALOXEGOL OXALATE 25 MG: 25 TABLET, FILM COATED ORAL at 10:32

## 2024-04-13 RX ADMIN — BACLOFEN 10 MG: 10 TABLET ORAL at 21:34

## 2024-04-13 RX ADMIN — FAMOTIDINE 20 MG: 20 TABLET, FILM COATED ORAL at 10:32

## 2024-04-13 RX ADMIN — DULOXETINE HYDROCHLORIDE 20 MG: 20 CAPSULE, DELAYED RELEASE ORAL at 21:34

## 2024-04-13 RX ADMIN — OXYCODONE 5 MG: 5 TABLET ORAL at 11:57

## 2024-04-13 RX ADMIN — ROPINIROLE HYDROCHLORIDE 1 MG: 1 TABLET, FILM COATED ORAL at 21:34

## 2024-04-13 RX ADMIN — BACLOFEN 10 MG: 10 TABLET ORAL at 10:31

## 2024-04-13 RX ADMIN — SENNOSIDES 17.2 MG: 8.6 TABLET ORAL at 21:34

## 2024-04-13 RX ADMIN — SODIUM CHLORIDE, PRESERVATIVE FREE 10 ML: 5 INJECTION INTRAVENOUS at 21:59

## 2024-04-13 RX ADMIN — DOCUSATE SODIUM 100 MG: 100 CAPSULE, LIQUID FILLED ORAL at 10:31

## 2024-04-13 RX ADMIN — SODIUM CHLORIDE, PRESERVATIVE FREE 10 ML: 5 INJECTION INTRAVENOUS at 10:33

## 2024-04-13 RX ADMIN — CEFTRIAXONE SODIUM 2000 MG: 2 INJECTION, POWDER, FOR SOLUTION INTRAMUSCULAR; INTRAVENOUS at 04:36

## 2024-04-13 RX ADMIN — KETOROLAC TROMETHAMINE 15 MG: 30 INJECTION, SOLUTION INTRAMUSCULAR at 12:48

## 2024-04-13 RX ADMIN — CEFTRIAXONE SODIUM 2000 MG: 2 INJECTION, POWDER, FOR SOLUTION INTRAMUSCULAR; INTRAVENOUS at 17:24

## 2024-04-13 RX ADMIN — AMLODIPINE BESYLATE 5 MG: 5 TABLET ORAL at 10:31

## 2024-04-13 RX ADMIN — HYDROMORPHONE HYDROCHLORIDE 1 MG: 1 INJECTION, SOLUTION INTRAMUSCULAR; INTRAVENOUS; SUBCUTANEOUS at 18:46

## 2024-04-13 ASSESSMENT — PAIN DESCRIPTION - LOCATION
LOCATION: BACK
LOCATION: BACK

## 2024-04-13 ASSESSMENT — PAIN SCALES - GENERAL
PAINLEVEL_OUTOF10: 8
PAINLEVEL_OUTOF10: 4

## 2024-04-13 NOTE — PROGRESS NOTES
[facility]  OCCUPATIONAL THERAPY MISSED TREATMENT NOTE  STRZ CVICU 4B  4B-08/008-A      Date: 2024  Patient Name: Alpa Marte        CSN: [id number]   : 1955  (68 y.o.)  Gender: female   Referring Practitioner: Santino Stewart PA-C  Diagnosis: Adjacent segment disease of lumbar spine with history of fusion procedure         REASON FOR MISSED TREATMENT: Hold Treatment per Nursing; states patient finally asleep after being in pain and in discomfort all day. States patient was impacted with BM and required assist to dis-impact. Ask to attempt back another date. OT to check back next available date.

## 2024-04-13 NOTE — PROGRESS NOTES
Hospitalist Progress Note      Patient:  Alpa Marte    Unit/Bed:4B-08/008-A  YOB: 1955  MRN: [id number]   Acct: [id number]   PCP: Connie Augustin PA  Date of Admission: 3/26/2024    Assessment/Plan:    #Meningitis and bacteremia.  -CSF molecular PCR 4/2 significant for E. coli K1.  Negative streptococci, negative Listeria. 19,780 leukocyte/cc CSF.  Patient encephalopathic beginning 4/2.    -No fever, nuchal rigidity. No photophobia.    -Empiric vancomycin, ampicillin discontinued 4/3.   - Cultures show sensitivity to ceftriaxone.   -S/p 4 intrathecal doses of gentamicin.    -Status post lumbar drain, removed 4/8.  -ID following.Continue with ceftriaxone.  -Recent back surgery complicated with a dural leak.Revision surgery was done with dura repair.She has a drain now draining bloody fluid.  -Neurosurgery following  -CSF cultures.  NGTD.Blood culture no growth to date.    #Acute left cerebellar hemorrhage.  - Stable on CT head 4/1.   -On 3/31, 12 x 7 mm area of acute hemorrhage left cerebellar hemisphere, with bilateral tentorium cerebri hemorrhage noted on MRI.    -Neurology signed off, defer to neurosurgery.   -Wound VAC removed 3/29.  S/p dexamethasone.  -Neurosurgery following.Recommended bed rest today.  -PT/ OT following  -Holding anticoagulation/antiplatelet. Resume per neurosurgery.    #Hyponatremia   -Patient has low oral intake.  Sodium from 4/8-4/11 between 131 134.    -She has had oscillating serum sodiums. 4/8, sodium 128 from prior 134.    -Urine sodium 105, unlikely SIADH, a.m. cortisol drawn approximately 8 AM 4/9 WNL.  -Monitor BMP    #Acute encephalopathy.  -Resolved.    -Secondary to meningitis, and likely underlying dementia versus schizophreniform-type disorder.   -Has Lama catheter.    #S/p lumbar fusion L3-sacrum.  - Prior lumbar procedures in 1989, 2003 at [facility].   -S/p CSF leak, CSF  left superior cerebellar arteries are not clearly seen.   7. Otherwise negative CTA of the head and neck.               **This report has been created using voice recognition software. It may contain minor errors which are inherent in voice recognition technology.**      Final report electronically signed by DR GASPER WOODSON on 3/31/2024 1:48 PM      MRI BRAIN WO CONTRAST   Final Result       1. 12 x 7 mm area of acute hemorrhage in the left cerebellar hemisphere.   2. There is hemorrhage along the tentorium cerebelli bilaterally.   3. There is a small area of susceptibility artifact in the subdural space over the right frontal lobe.    4. There is mild atrophy and probable ischemic changes in the white matter. There is no evidence for an acute infarct.   5. There are inflammatory changes in ethmoid air cells and mastoid air cells bilaterally and in the left maxillary sinus.               **This report has been created using voice recognition software. It may contain minor errors which are inherent in voice recognition technology.**      Final report electronically signed by DR GASPER WOODSON on 3/31/2024 12:21 PM      CT HEAD W WO CONTRAST   Final Result      Indistinct hyperattenuation in the left occipital lobe suspicious for a parenchymal hemorrhage.      This finding was discussed with Dr. Cruz on 3/30/2024 at 2:33 PM.      Final report electronically signed by Dr. Akash Carreno on 3/30/2024 2:35 PM      FLUORO FOR SURGICAL PROCEDURES   Final Result      XR LUMBAR SPINE (2-3 VIEWS)   Final Result    Intraoperative images for level localization.               **This report has been created using voice recognition software. It may contain minor errors which are inherent in voice recognition technology.**      Final report electronically signed by Dr. Luz Marina Robison on 3/27/2024 11:52 AM        XR LUMBAR SPINE (2-3 VIEWS)    Result Date: 3/27/2024  PROCEDURE: XR LUMBAR SPINE (2-3 VIEWS) CLINICAL INFORMATION:

## 2024-04-13 NOTE — PROGRESS NOTES
Progress note: Infectious diseases    Patient - Alpa Marte,  Age - 68 y.o.    - 1955      Room Number - 4B-08/008-A   MRN -  [id number]   [facility] # - [id number]  Date of Admission -  3/26/2024  7:58 AM    SUBJECTIVE:   No new issues  OBJECTIVE   VITALS    height is 1.651 m (5' 5\") and weight is 54.3 kg (119 lb 11.4 oz). Her axillary temperature is 97.6 °F (36.4 °C). Her blood pressure is 160/80 (abnormal) and her pulse is 101 (abnormal). Her respiration is 16 and oxygen saturation is 99%.       Wt Readings from Last 3 Encounters:   24 54.3 kg (119 lb 11.4 oz)   24 48.6 kg (107 lb 2.3 oz)   01/15/24 48.6 kg (107 lb 3.2 oz)       I/O (24 Hours)    Intake/Output Summary (Last 24 hours) at 2024 1044  Last data filed at 2024 0239  Gross per 24 hour   Intake 660 ml   Output 2055 ml   Net -1395 ml         General Appearance awake  HEENT - normocephalic, atraumatic, pink conjunctiva,  anicteric sclera  Neck - Supple, no mass  Lungs -  Bilateral   air entry, no rhonchi, no wheeze  Cardiovascular - Heart sounds are normal.     Abdomen - soft, not distended, nontender.     Neurologic -awake  Skin - No bruising or bleeding  Extremities - No edema, no cyanosis, clubbing   Dressed lumbar wound.(Post surgery) hemovac in place. No drainage    MEDICATIONS:      docusate sodium  100 mg Oral Daily    baclofen  10 mg Oral TID    sodium chloride flush  5-40 mL IntraVENous 2 times per day    naloxegol  25 mg Oral QAM AC    polyethylene glycol  17 g Oral BID    cefTRIAXone (ROCEPHIN) IV  2,000 mg IntraVENous Q12H    DULoxetine  20 mg Oral Nightly    rOPINIRole  1 mg Oral Nightly    lidocaine  3 patch TransDERmal Daily    senna  2 tablet Oral Nightly    famotidine  20 mg Oral Daily    amLODIPine  5 mg Oral Daily    [Held by provider] gabapentin  600 mg Oral QAM    And    [Held by provider] gabapentin  900 mg Oral Nightly     sodium chloride flush  5-40 mL IntraVENous 2 times per day      sodium chloride      sodium chloride 0 mL/hr at 04/09/24 1721     sodium chloride flush, sodium chloride, ketorolac, potassium chloride **OR** potassium alternative oral replacement **OR** potassium chloride, HYDROmorphone **OR** HYDROmorphone, oxyCODONE, acetaminophen, naloxone, [Held by provider] HYDROcodone 5 mg - acetaminophen **OR** [Held by provider] HYDROcodone 5 mg - acetaminophen, hydrALAZINE, ondansetron, labetalol, sodium chloride flush, sodium chloride      LABS:     CBC:   Recent Labs     04/11/24  0645 04/12/24  0353 04/13/24  0406   WBC 26.9* 19.7* 13.7*   HGB 10.7* 9.9* 10.1*    315 334       BMP:    Recent Labs     04/11/24  0645 04/12/24  0353 04/13/24  0407   * 135 133*   K 3.9 4.2 3.5   CL 95* 99 96*   CO2 25 18* 25   BUN 16 15 13   CREATININE 0.4 0.3* 0.3*   GLUCOSE 118* 105 87       Calcium:  Recent Labs     04/13/24  0407   CALCIUM 8.0*       Ionized Calcium:No results for input(s): \"IONCA\" in the last 72 hours.  Magnesium:  Recent Labs     04/13/24  0407   MG 2.0              Problem list of patient:     Patient Active Problem List   Diagnosis Code    Status post lumbar spinal fusion Z98.1    Moderate malnutrition (HCC) E44.0    Transient alteration of awareness R40.4    Intracranial bleeding (HCC) I62.9    Cerebellar hemorrhage (HCC) I61.4    Severe malnutrition (HCC) E43    Meningitis G03.9    Post-op pain G89.18         ASSESSMENT/PLAN   E. coli bacteremia with meningitis: Continue Rocephin every 12 hours.  Recent back surgery complicated with a dural leak:  she had revision with dura repair    Severe malnutrition  Continue current treatment    Jag Melendez MD, 4/13/2024 10:44 AM

## 2024-04-13 NOTE — PROGRESS NOTES
Neurosurgery Progress Note     Date: 4/13/2024   Patient:  Alpa Marte  YOB: 1955  Age: 68 y.o.  MRN: [id number]       Chief Complaint:   No chief complaint on file.    Subjective     HPI -  68 y.o. female who presented to [facility] for a planned surgical intervention. She presented with complaints of chronic worsening low back pain radiating primarily to the left lower extremity all the way to the foot including chronic weakness and chronic partial left foot drop.  She does not take any medication other than over-the-counter medications and treats with ice and heat and rest.  In the past she has been treated by pain specialist who provided some injection therapies (greater than 20 years prior) that provided only transient relief. A CT scan imaged on 2/21/2024 that reflect pedicle screws from L4-S1 along with laminectomies and a grade 3 anterior listhesis of L5 on S1 unchanged. The flexion-extension x-ray also shows the grade 3 to grade 4 anterior listhesis of L5 on S1 with no evidence of additional change in flexion or extension. SI joint imaging reveals some narrowing along with sclerosis for SI joints bilaterally.      Interval History -   4/6/24: POD #11. Patient continues have some confusion. No fevers overnight. Intrathecal gentamycin started yesterday.     4/8/24: Continued to have confusion, sitter at bedside. Continues to complain of back pain.  No fevers overnight. Lumbar drain is minimally functioning.     4/9/24: No acute events overnight.  Sitter at bedside.  Patient remains confused and complaining of low back pain, worse on the right side.  Preliminary CSF cultures without growth.     4/10/24: Patient seen and evaluated on 4B, sitter at bedside. Remains confused and oriented to self only, fidgety. Dressings remained dry overnight but have been saturated today.     4/13/24: POD #2 s/p lumbar wound revision for dural repair. Nursing reports she has been experiencing some

## 2024-04-14 PROCEDURE — 2580000003 HC RX 258: Performed by: PHYSICIAN ASSISTANT

## 2024-04-14 PROCEDURE — 6370000000 HC RX 637 (ALT 250 FOR IP): Performed by: PHYSICIAN ASSISTANT

## 2024-04-14 PROCEDURE — 6370000000 HC RX 637 (ALT 250 FOR IP)

## 2024-04-14 PROCEDURE — 6360000002 HC RX W HCPCS: Performed by: PHYSICIAN ASSISTANT

## 2024-04-14 PROCEDURE — 97530 THERAPEUTIC ACTIVITIES: CPT

## 2024-04-14 PROCEDURE — 6360000002 HC RX W HCPCS

## 2024-04-14 PROCEDURE — 2060000000 HC ICU INTERMEDIATE R&B

## 2024-04-14 PROCEDURE — 6370000000 HC RX 637 (ALT 250 FOR IP): Performed by: INTERNAL MEDICINE

## 2024-04-14 PROCEDURE — 99232 SBSQ HOSP IP/OBS MODERATE 35: CPT | Performed by: INTERNAL MEDICINE

## 2024-04-14 PROCEDURE — 97535 SELF CARE MNGMENT TRAINING: CPT

## 2024-04-14 PROCEDURE — APPSS30 APP SPLIT SHARED TIME 16-30 MINUTES

## 2024-04-14 RX ORDER — LORAZEPAM 2 MG/ML
1 INJECTION INTRAMUSCULAR ONCE
Status: COMPLETED | OUTPATIENT
Start: 2024-04-14 | End: 2024-04-14

## 2024-04-14 RX ADMIN — LORAZEPAM 1 MG: 2 INJECTION INTRAMUSCULAR; INTRAVENOUS at 22:19

## 2024-04-14 RX ADMIN — HYDROMORPHONE HYDROCHLORIDE 1 MG: 1 INJECTION, SOLUTION INTRAMUSCULAR; INTRAVENOUS; SUBCUTANEOUS at 19:36

## 2024-04-14 RX ADMIN — HYDROMORPHONE HYDROCHLORIDE 1 MG: 1 INJECTION, SOLUTION INTRAMUSCULAR; INTRAVENOUS; SUBCUTANEOUS at 13:55

## 2024-04-14 RX ADMIN — CEFTRIAXONE SODIUM 2000 MG: 2 INJECTION, POWDER, FOR SOLUTION INTRAMUSCULAR; INTRAVENOUS at 15:50

## 2024-04-14 RX ADMIN — SENNOSIDES 17.2 MG: 8.6 TABLET ORAL at 19:39

## 2024-04-14 RX ADMIN — SODIUM CHLORIDE, PRESERVATIVE FREE 10 ML: 5 INJECTION INTRAVENOUS at 08:17

## 2024-04-14 RX ADMIN — HYDROMORPHONE HYDROCHLORIDE 1 MG: 1 INJECTION, SOLUTION INTRAMUSCULAR; INTRAVENOUS; SUBCUTANEOUS at 08:20

## 2024-04-14 RX ADMIN — ONDANSETRON 4 MG: 2 INJECTION INTRAMUSCULAR; INTRAVENOUS at 10:58

## 2024-04-14 RX ADMIN — BACLOFEN 10 MG: 10 TABLET ORAL at 19:39

## 2024-04-14 RX ADMIN — BACLOFEN 10 MG: 10 TABLET ORAL at 08:20

## 2024-04-14 RX ADMIN — BACLOFEN 10 MG: 10 TABLET ORAL at 15:32

## 2024-04-14 RX ADMIN — CEFTRIAXONE SODIUM 2000 MG: 2 INJECTION, POWDER, FOR SOLUTION INTRAMUSCULAR; INTRAVENOUS at 05:27

## 2024-04-14 RX ADMIN — DULOXETINE HYDROCHLORIDE 20 MG: 20 CAPSULE, DELAYED RELEASE ORAL at 19:39

## 2024-04-14 RX ADMIN — ACETAMINOPHEN 650 MG: 325 TABLET ORAL at 19:48

## 2024-04-14 RX ADMIN — SODIUM CHLORIDE, PRESERVATIVE FREE 10 ML: 5 INJECTION INTRAVENOUS at 19:37

## 2024-04-14 RX ADMIN — NALOXEGOL OXALATE 25 MG: 25 TABLET, FILM COATED ORAL at 08:19

## 2024-04-14 RX ADMIN — OXYCODONE 5 MG: 5 TABLET ORAL at 19:48

## 2024-04-14 RX ADMIN — SODIUM CHLORIDE, PRESERVATIVE FREE 10 ML: 5 INJECTION INTRAVENOUS at 08:18

## 2024-04-14 RX ADMIN — DOCUSATE SODIUM 100 MG: 100 CAPSULE, LIQUID FILLED ORAL at 08:20

## 2024-04-14 RX ADMIN — FAMOTIDINE 20 MG: 20 TABLET, FILM COATED ORAL at 08:20

## 2024-04-14 RX ADMIN — AMLODIPINE BESYLATE 5 MG: 5 TABLET ORAL at 08:19

## 2024-04-14 RX ADMIN — HYDROMORPHONE HYDROCHLORIDE 1 MG: 1 INJECTION, SOLUTION INTRAMUSCULAR; INTRAVENOUS; SUBCUTANEOUS at 01:50

## 2024-04-14 RX ADMIN — HYDROMORPHONE HYDROCHLORIDE 1 MG: 1 INJECTION, SOLUTION INTRAMUSCULAR; INTRAVENOUS; SUBCUTANEOUS at 10:54

## 2024-04-14 RX ADMIN — POLYETHYLENE GLYCOL 3350 17 G: 17 POWDER, FOR SOLUTION ORAL at 19:39

## 2024-04-14 RX ADMIN — HYDROMORPHONE HYDROCHLORIDE 1 MG: 1 INJECTION, SOLUTION INTRAMUSCULAR; INTRAVENOUS; SUBCUTANEOUS at 05:31

## 2024-04-14 ASSESSMENT — PAIN DESCRIPTION - ORIENTATION
ORIENTATION: MID
ORIENTATION: RIGHT;LOWER
ORIENTATION: RIGHT;LEFT
ORIENTATION: RIGHT;LOWER

## 2024-04-14 ASSESSMENT — PAIN DESCRIPTION - LOCATION
LOCATION: ABDOMEN
LOCATION: BACK
LOCATION: LEG
LOCATION: BACK
LOCATION: BACK
LOCATION: ABDOMEN

## 2024-04-14 ASSESSMENT — PAIN SCALES - GENERAL
PAINLEVEL_OUTOF10: 5
PAINLEVEL_OUTOF10: 8
PAINLEVEL_OUTOF10: 8
PAINLEVEL_OUTOF10: 7
PAINLEVEL_OUTOF10: 7

## 2024-04-14 ASSESSMENT — PAIN DESCRIPTION - DESCRIPTORS
DESCRIPTORS: ACHING
DESCRIPTORS: SHARP
DESCRIPTORS: ACHING

## 2024-04-14 NOTE — PROGRESS NOTES
Neurosurgery Progress Note     Date: 4/14/2024   Patient:  Alpa Marte  YOB: 1955  Age: 68 y.o.  MRN: [id number]       Chief Complaint:   No chief complaint on file.    Subjective     HPI -  68 y.o. female who presented to [facility] for a planned surgical intervention. She presented with complaints of chronic worsening low back pain radiating primarily to the left lower extremity all the way to the foot including chronic weakness and chronic partial left foot drop.  She does not take any medication other than over-the-counter medications and treats with ice and heat and rest.  In the past she has been treated by pain specialist who provided some injection therapies (greater than 20 years prior) that provided only transient relief. A CT scan imaged on 2/21/2024 that reflect pedicle screws from L4-S1 along with laminectomies and a grade 3 anterior listhesis of L5 on S1 unchanged. The flexion-extension x-ray also shows the grade 3 to grade 4 anterior listhesis of L5 on S1 with no evidence of additional change in flexion or extension. SI joint imaging reveals some narrowing along with sclerosis for SI joints bilaterally.      Interval History -   4/6/24: POD #11. Patient continues have some confusion. No fevers overnight. Intrathecal gentamycin started yesterday.     4/8/24: Continued to have confusion, sitter at bedside. Continues to complain of back pain.  No fevers overnight. Lumbar drain is minimally functioning.     4/9/24: No acute events overnight.  Sitter at bedside.  Patient remains confused and complaining of low back pain, worse on the right side.  Preliminary CSF cultures without growth.     4/10/24: Patient seen and evaluated on 4B, sitter at bedside. Remains confused and oriented to self only, fidgety. Dressings remained dry overnight but have been saturated today.     4/13/24: POD #2 s/p lumbar wound revision for dural repair. Nursing reports she has been experiencing some  mg, Oral, Q4H PRN, Santino Stewart PA-C, 5 mg at 04/13/24 1157    cefTRIAXone (ROCEPHIN) 2,000 mg in sodium chloride 0.9 % 50 mL IVPB (mini-bag), 2,000 mg, IntraVENous, Q12H, Santino Stewart PA-C, Stopped at 04/14/24 0600    DULoxetine (CYMBALTA) extended release capsule 20 mg, 20 mg, Oral, Nightly, Santino Stewart PA-C, 20 mg at 04/13/24 2134    rOPINIRole (REQUIP) tablet 1 mg, 1 mg, Oral, Nightly, Santino Stewart PA-C, 1 mg at 04/13/24 2134    acetaminophen (TYLENOL) tablet 650 mg, 650 mg, Oral, Q4H PRN, Santino Stewart PA-C, 650 mg at 04/09/24 1243    naloxone (NARCAN) injection 0.4 mg, 0.4 mg, IntraVENous, PRN, Santino Stewart PA-C    [Held by provider] HYDROcodone-acetaminophen (NORCO) 5-325 MG per tablet 1 tablet, 1 tablet, Oral, Q4H PRN, 1 tablet at 04/06/24 0548 **OR** [Held by provider] HYDROcodone-acetaminophen (NORCO) 5-325 MG per tablet 2 tablet, 2 tablet, Oral, Q4H PRN, Micky Quintanilla, JOSE MANUEL - CNP, 2 tablet at 04/06/24 2005    lidocaine 4 % external patch 3 patch, 3 patch, TransDERmal, Daily, Santino Stewart PA-C, 3 patch at 04/13/24 1719    senna (SENOKOT) tablet 17.2 mg, 2 tablet, Oral, Nightly, Santino Stewart PA-C, 17.2 mg at 04/13/24 2134    famotidine (PEPCID) tablet 20 mg, 20 mg, Oral, Daily, Santino Stewart PA-C, 20 mg at 04/14/24 0820    hydrALAZINE (APRESOLINE) injection 5 mg, 5 mg, IntraVENous, Q6H PRN, Santino Stewart PA-C, 5 mg at 04/04/24 0929    amLODIPine (NORVASC) tablet 5 mg, 5 mg, Oral, Daily, Santino Stewart PA-C, 5 mg at 04/14/24 0819    ondansetron (ZOFRAN) injection 4 mg, 4 mg, IntraVENous, Q6H PRN, Santino Stewart PA-C, 4 mg at 04/14/24 1058    labetalol (NORMODYNE;TRANDATE) injection 5 mg, 5 mg, IntraVENous, Q4H PRN, Santino Stewart PA-C, 5 mg at 04/02/24 1609    [Held by provider] gabapentin (NEURONTIN) capsule 600 mg, 600 mg, Oral, QAM, 600 mg at 04/02/24 0934 **AND** [Held by provider] gabapentin (NEURONTIN)  hours.    No results for input(s): \"INR\" in the last 72 hours.  No results for input(s): \"CKTOTAL\", \"TROPONINI\" in the last 72 hours.  Assessment and Plan:        Active Hospital Problems    Diagnosis Date Noted    Post-op pain [G89.18] 04/03/2024    Severe malnutrition (HCC) [E43] 04/02/2024    Meningitis [G03.9] 04/02/2024    Intracranial bleeding (HCC) [I62.9] 03/31/2024    Cerebellar hemorrhage (HCC) [I61.4] 03/31/2024    Transient alteration of awareness [R40.4] 03/30/2024    Status post lumbar spinal fusion [Z98.1] 03/26/2024     PLAN:  - Lumbar fusion on 3/26/24  - Lumbar wound revision for dural leak repair on 4/11/24   - Surgical drain removed today. Surgical dressings replaced with dry gauze.  - Additional dressings changes every 2-3 days or as needed.   - Can begin to ambulate with PT/OT with brace  - L cerebellar hemorrhage initially noted on 3/31, stable CTH on 4/1  - Will need OP follow up in neurosurgery office for suture removal POD #14 (4/25)  - Additional follow with ID for abx recommendations.     Neurosurgery following.  Please reach out with any further questions or concerns.      This case was discussed with Dr. Caldera and he is in agreement with the assessment and plan.    Electronically signed by Akilah Handley PA-C on 4/14/2024 at 10:58 AM

## 2024-04-14 NOTE — PROGRESS NOTES
[facility]  STRZ CVICU 4B  Occupational Therapy  Re-assessment  Time:   Time In: 1007  Time Out: 1033  Timed Code Treatment Minutes: 26 Minutes  Minutes: 26      ** co-tx with PT due to patient's ability to tolerate two separate sessions, and possibly needing two skilled therapists for activity.     **following tx session, it was determined patient would benefit from two separate sessions.     Date: 2024  Patient Name: Alpa Marte,   Gender: female      [facility]  MRN: [id number]  : 1955  (68 y.o.)  Referring Practitioner: Santino Stewart PA-C  Diagnosis: Adjacent segment disease of lumbar spine with history of fusion procedure  Additional Pertinent Hx: Per MD H & P:\"68 y.o.  female, an every day smoker tobacco and 1/2 pack day history who admits to regular alcohol use and has a medical history significant for restless leg syndrome and urinary incontinence with a surgical history that includes prior lumbar fusions in  and again in  performed at [facility] resulting in pedicle screw and garima instrumentation at lumbar 4 spanning to S1.  She presents today unaccompanied and ambulating without assistance with complaints of chronic worsening low back pain radiating primarily to the left lower extremity all the way to the foot including chronic weakness and chronic partial left foot drop.  She does not take any medication other than over-the-counter medications and treats with ice and heat and rest.  In the past she has been treated by pain specialist who provided some injection therapies (greater than 20 years prior) that provided only transient relief. A CT scan imaged on 2024 that reflect pedicle screws from L4-S1 along with laminectomies and a grade 3 anterior listhesis of L5 on S1 unchanged. The flexion-extension x-ray also shows the grade 3 to grade 4 anterior listhesis of L5 on S1 with no evidence of additional change in flexion or extension.  to recall. No report of headache.    PAIN:  patient facial grimacing and yelling out t/o eval due to pain in low back and legs.     Vitals: Vitals not assessed per clinical judgement, see nursing flowsheet    COGNITION: Decreased Insight, Decreased Problem Solving, Decreased Safety Awareness, Impaired Attention, Difficulty Following Commands, Impulsive, and fearful of falling, fearful of pain    ADL:   Feeding: with set-up.  To open containers and encourage patient to eat. Patient had been feeling nauseous.   Footwear Management: Maximum Assistance.  To change socks to slipper socks seated EOB. Patient would not attempt or attempt within spinal precautions due to pain  -donned LSO seated EOB with MAX A after encouraging patient to attempt. .    IADL:   Not Tested    BALANCE:  Sitting Balance:  Contact Guard Assistance. Seated EOB. Patient started to dry heave while seated EOB vomiting saliva  Standing Balance: Contact Guard Assistance, X 2. With hand held assist and cues for tech. Patient initially picking up R LE when standing and cues for placing on ground for good MADONNA    BED MOBILITY:  Supine to Sit: Minimal Assistance, X 1 cues to initiate and sequence task    TRANSFERS:  Sit to Stand:  Minimal Assistance, X 2. From slightly elevated EOB and cues for tech and reassuring patient of her safety .[employer] in benefits of increasing activity.     FUNCTIONAL MOBILITY:  Assistive Device: None and hand held assist x 2  Assist Level:  Contact Guard Assistance and X 2.   Distance:  from EOB to recliner  Cues for lining up with recliner to sit safely. Increased time positioning with pillows for comfort.      Modified Braselton Scale:  Not Applicable    ASSESSMENT:     Activity Tolerance:  Patient tolerance of  treatment: Fair treatment tolerance      Discharge Recommendations: Continue to assess pending progress, Subacute/skilled nursing facility, 24 hour assistance or supervision, Not safe to return home at this time, and Patient  safety.k  Short Term Goal 5: Pt will complete self feeding with HOB elevated & min vcs for initiation.  Additional Goals?: No  Long Term Goals  Time Frame for Long Term Goals : No LTG set d/t short ELOS     Following session, patient left in safe position with all fall risk precautions in place.

## 2024-04-14 NOTE — PROGRESS NOTES
report electronically signed by Dr. Luz Marina Robison on 4/3/2024 7:26 AM      IR FLUORO GUIDED LUMBAR PUNCTURE THERAPY   Final Result   Status post successful lumbar spine/CSF drainage catheter insertion.            **This report has been created using voice recognition software.  It may contain minor errors which are inherent in voice recognition technology.**      Final report electronically signed by Dr. Umesh Fernandez on 4/1/2024 3:04 PM      CT HEAD WO CONTRAST   Final Result   Grossly stable exam from 03/30/2024. Attention on follow-up.      This document has been electronically signed by: Paulino Blackwell MD on    04/01/2024 05:45 AM      All CTs at this facility use dose modulation techniques and iterative    reconstructions, and/or weight-based dosing   when appropriate to reduce radiation to a low as reasonably achievable.      CTA NECK W WO CONTRAST   Final Result       1. There is plaque involving the left carotid bifurcation. There is no significant hemodynamic stenosis in the left common and internal carotid arteries.   2. There is no significant hemodynamic stenosis in the right common and internal carotid arteries.   3. There is antegrade flow in the right and left vertebral arteries.   4. There is plaque in the proximal left subclavian artery. There is atherosclerotic calcification in the aortic arch.   5. There is atherosclerotic calcification in the cavernous segments of both internal carotid arteries. There is no evidence of severe stenosis.   6. The right and left superior cerebellar arteries are not clearly seen.   7. Otherwise negative CTA of the head and neck.               **This report has been created using voice recognition software. It may contain minor errors which are inherent in voice recognition technology.**      Final report electronically signed by DR GASPER WOODSON on 3/31/2024 1:48 PM      CTA HEAD W WO CONTRAST   Final Result       1. There is plaque involving the left carotid  Dr. Akash Carreno on 3/30/2024 2:35 PM      FLUORO FOR SURGICAL PROCEDURES   Final Result      XR LUMBAR SPINE (2-3 VIEWS)   Final Result    Intraoperative images for level localization.               **This report has been created using voice recognition software. It may contain minor errors which are inherent in voice recognition technology.**      Final report electronically signed by Dr. Luz Marina Robison on 3/27/2024 11:52 AM        XR LUMBAR SPINE (2-3 VIEWS)    Result Date: 3/27/2024  PROCEDURE: XR LUMBAR SPINE (2-3 VIEWS) CLINICAL INFORMATION: Reexploration and Revision of Lumbar Instrumented Fusion for Extension of the Decompression and Instrumentation to L3-Pelvis (Head), COMPARISON: No prior study. TECHNIQUE: AP and lateral views of the lumbar spine were obtained intraoperatively. FINDINGS: There are bilateral pedicle screws in L3, L4, L5, S1 and traversing both sacroiliac joints. The hardware appears intact.      Intraoperative images for level localization. **This report has been created using voice recognition software. It may contain minor errors which are inherent in voice recognition technology.** Final report electronically signed by Dr. Luz Marina Robison on 3/27/2024 11:52 AM    FLUORO FOR SURGICAL PROCEDURES    Result Date: 3/26/2024  Radiology exam is complete. No Radiologist dictation. Please follow up with ordering provider.       Electronically signed by Gurvinder Felder MD on 4/14/2024 at 1:16 PM

## 2024-04-14 NOTE — PROGRESS NOTES
[facility]  INPATIENT PHYSICAL THERAPY  DAILY NOTE/Re-assessment  [facility] CVICU 4B - 4B-08008-A  Time In: 1007  Time Out: 1033  Timed Code Treatment Minutes: 26 Minutes  Minutes: 26     *Co-tx required with OT due to medical complexity of the pt requiring 2 skilled therapists and pt not able to tolerate 2 separate sessions.      *anticipate separate sessions based on reassessment    Date: 2024  Patient Name: Alpa Marte,  Gender:  female        MRN: [id number]  : 1955  (68 y.o.)     Referring Practitioner: Santino Stewart PA-C  Diagnosis: Adjacent segment disease of lumbar spine with history of fusion procedure  Additional Pertinent Hx: Per HPI, \"Alpa Marte is an 68 y.o.  female, an every day smoker tobacco and 1/2 pack day history who admits to regular alcohol use and has a medical history significant for restless leg syndrome and urinary incontinence with a surgical history that includes prior lumbar fusions in  and again in  performed at [facility] resulting in pedicle screw and garima instrumentation at lumbar 4 spanning to S1.  She presents today unaccompanied and ambulating without assistance with complaints of chronic worsening low back pain radiating primarily to the left lower extremity all the way to the foot including chronic weakness and chronic partial left foot drop.  She does not take any medication other than over-the-counter medications and treats with ice and heat and rest.  In the past she has been treated by pain specialist who provided some injection therapies (greater than 20 years prior) that provided only transient relief. A CT scan imaged on 2024 that reflect pedicle screws from L4-S1 along with laminectomies and a grade 3 anterior listhesis of L5 on S1 unchanged. The flexion-extension x-ray also shows the grade 3 to grade 4 anterior listhesis of L5 on S1 with no evidence of additional change in flexion or extension. SI joint  Vitals not assessed per clinical judgement, see nursing flowsheet    OBJECTIVE:  Bed Mobility:  Rolling to Right: Minimal Assistance, X 1, with head of bed flat, with rail, with verbal cues    Supine to Sit: Minimal Assistance, X 1, with head of bed flat, with rail, with verbal cues   Educated on log roll technique, to maintain back precautions.   Transfers:  Sit to Stand: Minimal Assistance, X 2, with increased time for completion, cues for hand placement, with verbal cues  Stand to Sit:Minimal Assistance, X 2, cues for hand placement, with verbal cues  HHA for safety, pt declined wanting to use an AD at the time. Slow transitions  Ambulation:  Minimal Assistance, X 1, with cues for safety, with verbal cues , with increased time for completion  Distance: 3 feet to chair  Surface: Level Tile  Device:Hand-Held Assist bilaterally  Gait Deviations:  Slow Whitney, Decreased Step Length Bilaterally, Decreased Weight Shift Bilaterally, Decreased Gait Speed, Narrow Base of Support, and Unsteady Gait  Cues for sequencing, slow pace, initially would not place WB on R LE, once instructed to do so, pt able to complete safely.   Balance:  Static Sitting Balance:  Stand By Assistance, with cues for safety, with verbal cues   Dynamic Sitting Balance: Contact Guard Assistance  Static Standing Balance: Minimal Assistance, X 2, with verbal cues   Pt sitting on EOB for increased period of time to prepare for transfers and increased anxiety noted from pt, 2 episodes of vomiting/dry heaving.  Assisted with donning brace with education on application, decreased carryover. Pt standing with HHA, decreased WB noted on R LE, cues for placing R foot on floor to prepare to amb, good demo when cued.     Functional Outcome Measures: Completed  AM-PAC Inpatient Mobility Raw Score : 14  AM-PAC Inpatient T-Scale Score : 38.1  Modified [place] Scale:  +4 - Moderately severe disability; unable to walk and attend to bodily needs without assistance.    progress with mobility.  Short Term Goal 4: Pt ed tolerate 10-20 reps of ther ex to increase overall mobility.    Following session, patient left in safe position with all fall risk precautions in place. Pt in bedside chair following session, all needs and call light in reach, alarm on.

## 2024-04-15 LAB
FUNGUS SPEC CULT: NORMAL
FUNGUS SPEC FUNGUS STN: NORMAL

## 2024-04-15 PROCEDURE — 6370000000 HC RX 637 (ALT 250 FOR IP)

## 2024-04-15 PROCEDURE — 2580000003 HC RX 258: Performed by: PHYSICIAN ASSISTANT

## 2024-04-15 PROCEDURE — 6370000000 HC RX 637 (ALT 250 FOR IP): Performed by: PHYSICIAN ASSISTANT

## 2024-04-15 PROCEDURE — 6370000000 HC RX 637 (ALT 250 FOR IP): Performed by: INTERNAL MEDICINE

## 2024-04-15 PROCEDURE — 97110 THERAPEUTIC EXERCISES: CPT

## 2024-04-15 PROCEDURE — 97530 THERAPEUTIC ACTIVITIES: CPT

## 2024-04-15 PROCEDURE — 6360000002 HC RX W HCPCS

## 2024-04-15 PROCEDURE — 6360000002 HC RX W HCPCS: Performed by: PHYSICIAN ASSISTANT

## 2024-04-15 PROCEDURE — APPSS30 APP SPLIT SHARED TIME 16-30 MINUTES: Performed by: PHYSICIAN ASSISTANT

## 2024-04-15 PROCEDURE — 2060000000 HC ICU INTERMEDIATE R&B

## 2024-04-15 PROCEDURE — 99232 SBSQ HOSP IP/OBS MODERATE 35: CPT | Performed by: INTERNAL MEDICINE

## 2024-04-15 RX ORDER — LORAZEPAM 2 MG/ML
1 INJECTION INTRAMUSCULAR ONCE
Status: COMPLETED | OUTPATIENT
Start: 2024-04-15 | End: 2024-04-15

## 2024-04-15 RX ADMIN — HYDROMORPHONE HYDROCHLORIDE 1 MG: 1 INJECTION, SOLUTION INTRAMUSCULAR; INTRAVENOUS; SUBCUTANEOUS at 04:31

## 2024-04-15 RX ADMIN — BACLOFEN 10 MG: 10 TABLET ORAL at 10:24

## 2024-04-15 RX ADMIN — BACLOFEN 10 MG: 10 TABLET ORAL at 14:24

## 2024-04-15 RX ADMIN — AMLODIPINE BESYLATE 5 MG: 5 TABLET ORAL at 10:24

## 2024-04-15 RX ADMIN — HYDROMORPHONE HYDROCHLORIDE 1 MG: 1 INJECTION, SOLUTION INTRAMUSCULAR; INTRAVENOUS; SUBCUTANEOUS at 14:21

## 2024-04-15 RX ADMIN — SODIUM CHLORIDE, PRESERVATIVE FREE 10 ML: 5 INJECTION INTRAVENOUS at 10:25

## 2024-04-15 RX ADMIN — DULOXETINE HYDROCHLORIDE 20 MG: 20 CAPSULE, DELAYED RELEASE ORAL at 20:11

## 2024-04-15 RX ADMIN — BACLOFEN 10 MG: 10 TABLET ORAL at 20:11

## 2024-04-15 RX ADMIN — FAMOTIDINE 20 MG: 20 TABLET, FILM COATED ORAL at 10:24

## 2024-04-15 RX ADMIN — DOCUSATE SODIUM 100 MG: 100 CAPSULE, LIQUID FILLED ORAL at 10:24

## 2024-04-15 RX ADMIN — POLYETHYLENE GLYCOL 3350 17 G: 17 POWDER, FOR SOLUTION ORAL at 10:23

## 2024-04-15 RX ADMIN — CEFTRIAXONE SODIUM 2000 MG: 2 INJECTION, POWDER, FOR SOLUTION INTRAMUSCULAR; INTRAVENOUS at 17:12

## 2024-04-15 RX ADMIN — ROPINIROLE HYDROCHLORIDE 1 MG: 1 TABLET, FILM COATED ORAL at 20:11

## 2024-04-15 RX ADMIN — CEFTRIAXONE SODIUM 2000 MG: 2 INJECTION, POWDER, FOR SOLUTION INTRAMUSCULAR; INTRAVENOUS at 04:38

## 2024-04-15 RX ADMIN — LORAZEPAM 1 MG: 2 INJECTION INTRAMUSCULAR; INTRAVENOUS at 20:11

## 2024-04-15 RX ADMIN — NALOXEGOL OXALATE 25 MG: 25 TABLET, FILM COATED ORAL at 10:24

## 2024-04-15 ASSESSMENT — PAIN - FUNCTIONAL ASSESSMENT
PAIN_FUNCTIONAL_ASSESSMENT: PREVENTS OR INTERFERES SOME ACTIVE ACTIVITIES AND ADLS

## 2024-04-15 ASSESSMENT — PAIN DESCRIPTION - ORIENTATION
ORIENTATION: MID
ORIENTATION: MID;LOWER
ORIENTATION: MID;LOWER

## 2024-04-15 ASSESSMENT — PAIN SCALES - GENERAL
PAINLEVEL_OUTOF10: 7
PAINLEVEL_OUTOF10: 5
PAINLEVEL_OUTOF10: 4

## 2024-04-15 ASSESSMENT — PAIN DESCRIPTION - LOCATION
LOCATION: BACK

## 2024-04-15 ASSESSMENT — PAIN DESCRIPTION - DESCRIPTORS
DESCRIPTORS: ACHING;DISCOMFORT;SHARP
DESCRIPTORS: SHARP
DESCRIPTORS: ACHING

## 2024-04-15 NOTE — PROGRESS NOTES
Progress note: Infectious diseases    Patient - Alpa Marte,  Age - 68 y.o.    - 1955      Room Number - 4B-08/008-A   MRN -  [id number]   [facility] # - [id number]  Date of Admission -  3/26/2024  7:58 AM    SUBJECTIVE:   No new complaints  OBJECTIVE   VITALS    height is 1.651 m (5' 5\") and weight is 44.3 kg (97 lb 10.6 oz). Her oral temperature is 97.6 °F (36.4 °C). Her blood pressure is 115/81 and her pulse is 98. Her respiration is 18 and oxygen saturation is 97%.       Wt Readings from Last 3 Encounters:   04/15/24 44.3 kg (97 lb 10.6 oz)   24 48.6 kg (107 lb 2.3 oz)   01/15/24 48.6 kg (107 lb 3.2 oz)       I/O (24 Hours)    Intake/Output Summary (Last 24 hours) at 4/15/2024 0849  Last data filed at 4/15/2024 0602  Gross per 24 hour   Intake 840 ml   Output 850 ml   Net -10 ml         General Appearance awake  HEENT - normocephalic, atraumatic, pink conjunctiva,  anicteric sclera  Neck - Supple, no mass  Lungs -  Bilateral   air entry, no rhonchi, no wheeze  Cardiovascular - Heart sounds are normal.     Abdomen - soft, not distended, nontender.     Neurologic -awake  Skin - No bruising or bleeding  Extremities - No edema, no cyanosis, clubbing   Dressed lumbar wound.(Post surgery)      MEDICATIONS:      docusate sodium  100 mg Oral Daily    baclofen  10 mg Oral TID    sodium chloride flush  5-40 mL IntraVENous 2 times per day    naloxegol  25 mg Oral QAM AC    polyethylene glycol  17 g Oral BID    cefTRIAXone (ROCEPHIN) IV  2,000 mg IntraVENous Q12H    DULoxetine  20 mg Oral Nightly    rOPINIRole  1 mg Oral Nightly    lidocaine  3 patch TransDERmal Daily    senna  2 tablet Oral Nightly    famotidine  20 mg Oral Daily    amLODIPine  5 mg Oral Daily    [Held by provider] gabapentin  600 mg Oral QAM    And    [Held by provider] gabapentin  900 mg Oral Nightly    sodium chloride flush  5-40 mL IntraVENous 2 times

## 2024-04-15 NOTE — PROGRESS NOTES
[facility]  INPATIENT PHYSICAL THERAPY  DAILY NOTE  [facility] CVICU 4B - 4B-08/008-A      Time In: 07  Time Out: 08  Timed Code Treatment Minutes: 41 Minutes  Minutes: 41          Date: 4/15/2024  Patient Name: Alpa Marte,  Gender:  female        MRN: [id number]  : 1955  (68 y.o.)     Referring Practitioner: Santino Stewart PA-C  Diagnosis: Adjacent segment disease of lumbar spine with history of fusion procedure  Additional Pertinent Hx: Per HPI, \"Alpa Marte is an 68 y.o.  female, an every day smoker tobacco and 1/2 pack day history who admits to regular alcohol use and has a medical history significant for restless leg syndrome and urinary incontinence with a surgical history that includes prior lumbar fusions in  and again in  performed at [facility] resulting in pedicle screw and garima instrumentation at lumbar 4 spanning to S1.  She presents today unaccompanied and ambulating without assistance with complaints of chronic worsening low back pain radiating primarily to the left lower extremity all the way to the foot including chronic weakness and chronic partial left foot drop.  She does not take any medication other than over-the-counter medications and treats with ice and heat and rest.  In the past she has been treated by pain specialist who provided some injection therapies (greater than 20 years prior) that provided only transient relief. A CT scan imaged on 2024 that reflect pedicle screws from L4-S1 along with laminectomies and a grade 3 anterior listhesis of L5 on S1 unchanged. The flexion-extension x-ray also shows the grade 3 to grade 4 anterior listhesis of L5 on S1 with no evidence of additional change in flexion or extension. SI joint imaging reveals some narrowing along with sclerosis for SI joints bilaterally.\"  Pt is now s/p Reexploration and Revision of Lumbar Instrumented Fusion for Extension of the Decompression and  Instrumentation to L3-Pelvis. Pt was noted to have dura leak during surgery and has been on bedrest since surgery on 3/26/24-- 3/31- MRI- Brain noted with acute hemorrhage left cerebellar hemisphere-  4/2 lumbar drain placed due to dura leak. Pt is s/p Lumbar wound re-exploration and revision for dural repair of CSF leak completed by Dr. Santos on 4/11. Pt on bedrest for 48 hours following. OK to see 4/13 in PM or later.     Prior Level of Function:  Lives With: Alone  Type of Home: Trailer  Home Layout: One level  Home Access: Stairs to enter with rails  Home Equipment: Cane, Walker, rolling   Bathroom Shower/Tub: Tub/Shower unit  Bathroom Toilet: Standard  Bathroom Equipment: Tub transfer bench  Bathroom Accessibility: Accessible    Receives Help From: Friend(s)  ADL Assistance: Independent  Homemaking Assistance: Independent  Homemaking Responsibilities: Yes  Ambulation Assistance: Independent  Transfer Assistance: Independent  Active : Yes    Restrictions/Precautions:  Restrictions/Precautions: General Precautions, Fall Risk, Surgical Protocols  Required Braces or Orthoses  Spinal: Lumbar Corset  Position Activity Restriction  Spinal Precautions: No Bending, No Lifting, No Twisting  Other position/activity restrictions: + CVA, monitor for HA,       SUBJECTIVE: nursing ok'd to try therapy, pt resting in bed on arrival w/ HOB flat and on her back comfortably she had removed her gown and her heart monitor, pt confused and perseverated on there being a bomb and even after she was reoriented she cont to [facility] was [facility] [place] [employer]- she knew she had back sx she wasn't able to recall any back precautions- she needed max assist to ravi her gown and she removed it once sitting edge of bed and required max assist again to ravi her gown - discussed breathing technique to help with pain management      PAIN: while in bed w/ HOB flat pt is comfortable and rated pain 0-1/10- however w/ mobility pt  reported pain 10/10 has increasing pain and describes it as sharp pain in left upper buttocks and shooting up and down her left leg pt demonstrating w/ pain behaviors of guarding/positioning and yelling out in pain- and unable to complete any gait this date due to increased pain  - encouraged deep breathing technique to help w/ pain management    Vitals: Vitals not assessed per clinical judgement, see nursing flowsheet    OBJECTIVE:  Bed Mobility:  Rolling to Left: Moderate Assistance, incereased pain going to her left w/ use of rail    Rolling to Right: Minimal Assistance, with rail   Supine to Sit: Maximum Assistance, with rail and cues for log roll technique   Sit to Supine: Maximum Assistance, she needed cues for proper technique and assist at trunk and bambi LEs    Scooting: Moderate Assistance, pt c/o of increased pain     Transfers:   Sit to Stand: Moderate Assistance, to max assist- completed multiple times pt c/o of pain in left LE when she would place it on the floor, poss having muscle spasms however attempted to stand > 6 times and only able to complete full stand 1 trial  Stand to Sit:Moderate Assistance, to max assist with poor control due to increased pain     Ambulation:  Unable to complete despite multiple attempts     Balance:  Pt sat edge of bed for > 20 min her sitting balance varied from CGA to max assist- she tended push back into extension w/ pain behaviors -pt was dependent to ravi her back brace   Standing at walker with max assist pt struggled to wb at left LE and was flexed at bambi LEs she stood < 5 sec   Exercise:  Patient was guided in 1 set(s) 5 reps of exercise to both lower extremities.  Ankle pumps, Heelslides, Short arc quads, and Long arc quads.  Exercises were completed for increased independence with functional mobility.    Functional Outcome Measures: Completed  AM-PAC Inpatient Mobility Raw Score : 9  AM-PAC Inpatient T-Scale Score : 30.55  Modified [place] Scale:  +5 - Severe

## 2024-04-15 NOTE — PROGRESS NOTES
Hospitalist Progress Note      Patient:  Alpa Marte    Unit/Bed:4B-08/008-A  YOB: 1955  MRN: [id number]   Acct: [id number]   PCP: Connie Augustin PA  Date of Admission: 3/26/2024    Assessment/Plan:    #Meningitis and bacteremia.  -CSF molecular PCR 4/2 significant for E. coli K1.  Negative streptococci, negative Listeria. 19,780 leukocyte/cc CSF.  Patient encephalopathic beginning 4/2.    -No fever, nuchal rigidity. No photophobia.    -Empiric vancomycin, ampicillin discontinued 4/3.   - Cultures show sensitivity to ceftriaxone.   -S/p 4 intrathecal doses of gentamicin.    -Status post lumbar drain, removed 4/8.  -ID following.Continue with ceftriaxone.  -Recent back surgery complicated with a dural leak.Revision surgery was done with dura repair.Drain was removed today.  -Neurosurgery following  -CSF cultures.  NGTD.Blood culture no growth to date.    #Acute left cerebellar hemorrhage.  - Stable on CT head 4/1.   -On 3/31, 12 x 7 mm area of acute hemorrhage left cerebellar hemisphere, with bilateral tentorium cerebri hemorrhage noted on MRI.    -Neurology signed off, defer to neurosurgery.   -Wound VAC removed 3/29.  S/p dexamethasone.  -Neurosurgery following.As per neuro surgery :  -Surgical drain removed today. Surgical dressings replaced with dry gauze.  - Additional dressings changes every 2-3 days or as needed.   - Can begin to ambulate with PT/OT with brace  - L cerebellar hemorrhage initially noted on 3/31, stable CTH on 4/1  - Will need OP follow up in neurosurgery office for suture removal POD #14 (4/25)  -PT/ OT following      #Hyponatremia   -Patient has low oral intake.  Sodium from 4/8-4/11 between 131 134.    -She has had oscillating serum sodiums. 4/8, sodium 128 from prior 134.    -Urine sodium 105, unlikely SIADH, a.m. cortisol drawn approximately 8 AM 4/9 WNL.  -Labs not done.Ordered for AM.    #Acute

## 2024-04-15 NOTE — PROGRESS NOTES
Comprehensive Nutrition Assessment    Type and Reason for Visit:  Reassess    Nutrition Recommendations/Plan:    Consider MVI and ? Appetite Stimulant   ONS: Ensure Plus TID, Magic Cup BID  Nutrition ileus prevention: Activia and Hot Beverage TID  Continue current diet   Bowel meds as per Provider- first BM was day 17 of admit (4/12)  Patient refusing NG tube currently for supplemental tube feed      Malnutrition Assessment:  Malnutrition Status:  Severe malnutrition (04/02/24 1422)    Context:  Chronic Illness     Findings of the 6 clinical characteristics of malnutrition:  Energy Intake:  75% or less estimated energy requirements for 1 month or longer  Weight Loss:  7.5% over 3 months (8% loss in 3 months from office visit weight of 108# 6.4 oz on 1/4/24)     Body Fat Loss:  Severe body fat loss Triceps, Fat Overlying Ribs, Buccal region, Orbital   Muscle Mass Loss:  Severe muscle mass loss Temples (temporalis), Scapula (trapezius), Clavicles (pectoralis & deltoids), Thigh (quadriceps), Calf (gastrocnemius)  Fluid Accumulation:  No significant fluid accumulation     Strength:  Not Performed    Nutrition Assessment:      Pt. with minimal improvement from a nutritional standpoint AEB meal intake 50-75% with encouragement from staff otherwise intake remains minimal but does like ONS and yogurt.  Remains at risk for further nutritional compromise r/t severe malnutrition, constipation, acute L cerebellar hemorrhage on 3/31, s/p lumbar fusion L3-sacrum- wound vac removed 3/29 and lumbar drain placed by IR 4/1 d/t CSF leak-removed 4/8/24, lumbar wound re-exploration and revision for dural repair of CSF leak 4/11, hypertensive urgency, E. coli bacteremia with meningitis, and underlying medical condition (PMHx: s/p lumbar fusion 1998 and 2003, current smoker).       Nutrition Related Findings:    Pt. Report/Treatments/Miscellaneous: Patient reports continues to have pain control issues and had small BM, likes all the  Requirements: Current (45.1 kg)  Protein (g/day): 77+ grams (1.7+ grams/kg)  Method Used for Fluid Requirements: 1 ml/kcal  Fluid (ml/day): 3411-9439 ml (1ml/kcal/day) or per MD    Nutrition Diagnosis:   Severe malnutrition, In context of chronic illness related to inadequate protein-energy intake, pain as evidenced by Criteria as identified in malnutrition assessment    Nutrition Interventions:   Food and/or Nutrient Delivery: Continue Current Diet, Continue Oral Nutrition Supplement  Nutrition Education/Counseling: Education initiated  Coordination of Nutrition Care: Continue to monitor while inpatient, Interdisciplinary Rounds, Speech Therapy       Goals:  Previous Goal Met: Progressing toward Goal(s)  Goals: Meet at least 75% of estimated needs, by next RD assessment       Nutrition Monitoring and Evaluation:      Food/Nutrient Intake Outcomes: Food and Nutrient Intake, Supplement Intake  Physical Signs/Symptoms Outcomes: Biochemical Data, Chewing or Swallowing, GI Status, Fluid Status or Edema, Nutrition Focused Physical Findings, Skin, Weight    Discharge Planning:    Too soon to determine     Zaina Gibbs RD, LD  Contact: [phone]

## 2024-04-15 NOTE — PLAN OF CARE
Problem: Discharge Planning  Goal: Discharge to home or other facility with appropriate resources  Outcome: Progressing     Problem: Pain  Goal: Verbalizes/displays adequate comfort level or baseline comfort level  Outcome: Progressing     Problem: ABCDS Injury Assessment  Goal: Absence of physical injury  Outcome: Progressing     Problem: Safety - Adult  Goal: Free from fall injury  Outcome: Progressing     Problem: Nutrition Deficit:  Goal: Optimize nutritional status  4/15/2024 1324 by Adry Flores RN  Outcome: Progressing  4/15/2024 1100 by Zaina Gibbs RD, LD  Flowsheets (Taken 4/15/2024 1100)  Nutrient intake appropriate for improving, restoring, or maintaining nutritional needs: Assess nutritional status and recommend course of action     Problem: Skin/Tissue Integrity  Goal: Absence of new skin breakdown  Description: 1.  Monitor for areas of redness and/or skin breakdown  2.  Assess vascular access sites hourly  3.  Every 4-6 hours minimum:  Change oxygen saturation probe site  4.  Every 4-6 hours:  If on nasal continuous positive airway pressure, respiratory therapy assess nares and determine need for appliance change or resting period.  Outcome: Progressing     Problem: Confusion  Goal: Confusion, delirium, dementia, or psychosis is improved or at baseline  Description: INTERVENTIONS:  1. Assess for possible contributors to thought disturbance, including medications, impaired vision or hearing, underlying metabolic abnormalities, dehydration, psychiatric diagnoses, and notify attending LIP  2. [place] high risk fall precautions, as indicated  3. Provide frequent short contacts to provide reality reorientation, refocusing and direction  4. Decrease environmental stimuli, including noise as appropriate  5. Monitor and intervene to maintain adequate nutrition, hydration, elimination, sleep and activity  6. If unable to ensure safety without constant attention obtain sitter and review

## 2024-04-15 NOTE — CARE COORDINATION
4/15/24, 8:11 AM EDT    DISCHARGE ON GOING EVALUATION    [facility] day: 20  Location: -08/008-A Reason for admit: Adjacent segment disease of lumbar spine with history of fusion procedure [M51.36, Z98.1]  S/P lumbar fusion [Z98.1]  Meningitis [G03.9]     Procedures:   3/26 OR with Dr. Caldera: Reexploration and Revision of Lumbar Instrumented Fusion for Extension of the Decompression and Instrumentation to L3-Pelvis    3/29 lumbar drain removed  4/1 Lumbar CSF drain placed  4/3 CSF Lumbar drain replaced in IR  4/8 Lumbar drain removed  4/11 OR with Dr. Caldera: Lumbar wound re-exploration and revision for dural repair of CSF leak.    Imaging since last note: none    Barriers to Discharge: Hospitalist, ID, Neurosurgery following. POD #20 and POD #4. CSF culture: positive E.Coli. PT/OT/SLP. Rocephin iv q12hr. Movantik. Pain and nausea control. Telesitter.     PCP: Connie Augustin PA  Readmission Risk Score: 12.3%    Patient Goals/Plan/Treatment Preferences: From home. [facility] BURROUGHS was to start precert last week. Reached out to [facility] Liaison to make sure was started. Precert will be started today.

## 2024-04-15 NOTE — PROGRESS NOTES
Neurosurgery Progress Note    Patient:  Alpa Marte      Unit/Bed:-08/008-A    YOB: 1955    MRN: [id number]     Acct: [id number]     Admit date: 3/26/2024    No chief complaint on file.      Patient Seen, Chart, Physician notes, Labs, Radiology studies reviewed.    Subjective: Patient is seen and evaluated on 4B with evaluation and exam findings reviewed and discussed with Dr. Caldera/neurosurgeon with nursing.  Patient was resting comfortably with pain moderately controlled.  Some positional discomfort was noted with incisional site pain improved.        Past, Family, Social History unchanged from admission.    Diet:  ADULT DIET; Regular  ADULT ORAL NUTRITION SUPPLEMENT; Breakfast, Lunch, Dinner; Standard High Calorie/High Protein Oral Supplement  ADULT ORAL NUTRITION SUPPLEMENT; Lunch, Dinner; Frozen Oral Supplement  ADULT ORAL NUTRITION SUPPLEMENT; Breakfast, Lunch, Dinner; Other Oral Supplement; Activia and Hot Beverage    Medications:  Scheduled Meds:   docusate sodium  100 mg Oral Daily    baclofen  10 mg Oral TID    sodium chloride flush  5-40 mL IntraVENous 2 times per day    naloxegol  25 mg Oral QAM AC    polyethylene glycol  17 g Oral BID    cefTRIAXone (ROCEPHIN) IV  2,000 mg IntraVENous Q12H    DULoxetine  20 mg Oral Nightly    rOPINIRole  1 mg Oral Nightly    lidocaine  3 patch TransDERmal Daily    senna  2 tablet Oral Nightly    famotidine  20 mg Oral Daily    amLODIPine  5 mg Oral Daily    [Held by provider] gabapentin  600 mg Oral QAM    And    [Held by provider] gabapentin  900 mg Oral Nightly    sodium chloride flush  5-40 mL IntraVENous 2 times per day     Continuous Infusions:   sodium chloride      sodium chloride 0 mL/hr at 04/09/24 1721     PRN Meds:sodium chloride flush, sodium chloride, potassium chloride **OR** potassium alternative oral replacement **OR** potassium chloride, HYDROmorphone **OR** HYDROmorphone, oxyCODONE, acetaminophen, naloxone, [Held by provider]  HYDROcodone 5 mg - acetaminophen **OR** [Held by provider] HYDROcodone 5 mg - acetaminophen, hydrALAZINE, ondansetron, labetalol, sodium chloride flush, sodium chloride    Objective: She is observed lying in bed with the head of the bed mildly elevated and pain well to moderately controlled.  Recently change dressings remain intact over flat dry incision.  She remains otherwise stable and neurologically intact to her baseline with no additional changes noted to the patient's chart overnight.    Vitals: BP (!) 129/59   Pulse 98   Temp 97.5 °F (36.4 °C) (Oral)   Resp 16   Ht 1.651 m (5' 5\")   Wt 44.3 kg (97 lb 10.6 oz)   SpO2 98%   BMI 16.25 kg/m²     Physical Exam     Physical Exam:  Some continuing baseline confusion  Language appropriate, with no aphasia.  Pupils equal.  Facial strength symmetric.    Review of Systems   A level of confusion precludes comprehensive review of systems  24 hour intake/output:  Intake/Output Summary (Last 24 hours) at 4/15/2024 0721  Last data filed at 4/15/2024 0602  Gross per 24 hour   Intake 840 ml   Output 850 ml   Net -10 ml     Last 3 weights:  Wt Readings from Last 3 Encounters:   04/15/24 44.3 kg (97 lb 10.6 oz)   03/11/24 48.6 kg (107 lb 2.3 oz)   01/15/24 48.6 kg (107 lb 3.2 oz)         CBC:   Recent Labs     04/13/24  0406   WBC 13.7*   HGB 10.1*        BMP:    Recent Labs     04/13/24  0407   *   K 3.5   CL 96*   CO2 25   BUN 13   CREATININE 0.3*   GLUCOSE 87     Calcium:  Recent Labs     04/13/24  0407   CALCIUM 8.0*     Magnesium:  Recent Labs     04/13/24  0407   MG 2.0     Glucose:No results for input(s): \"POCGLU\" in the last 72 hours.  HgbA1C: No results for input(s): \"LABA1C\" in the last 72 hours.  Lipids: No results for input(s): \"CHOL\", \"TRIG\", \"HDL\", \"LDL\", \"LDLCALC\" in the last 72 hours.    Radiology reports as per the Radiologist  Radiology: XR LUMBAR SPINE (2-3 VIEWS)    Result Date: 3/27/2024  PROCEDURE: XR LUMBAR SPINE (2-3 VIEWS) CLINICAL  no

## 2024-04-15 NOTE — PROGRESS NOTES
2145 Patient is restless in bed, thrashing back and forth, stating \"I've been in pain for over 4 hours and no one is doing anything about it!\" This RN had already assessed patient and given her oral pain medication along with other nightly scheduled medications. Patient was only oriented to self at that time but was pleasantly confused. She complained of RLQ pain. Patient was given bowel regimen earlier this shift. This RN pulled IV dilaudid to give to patient, but when this RN returned to patient's room, patient refused the medication, stating \"I know I'm acting like a baby, but I'm not taking that!\" Dilaudid was wasted with JAMES Wallace, in [facility]. This RN notified Chance Marie CNP. He came to assess patient at bedside and ordered IV ativan. See order.

## 2024-04-16 LAB
BACTERIA SPEC AEROBE CULT: NORMAL
BACTERIA SPEC ANAEROBE CULT: NORMAL
GRAM STN SPEC: NORMAL

## 2024-04-16 PROCEDURE — 2580000003 HC RX 258: Performed by: PHYSICIAN ASSISTANT

## 2024-04-16 PROCEDURE — 6370000000 HC RX 637 (ALT 250 FOR IP): Performed by: PHYSICIAN ASSISTANT

## 2024-04-16 PROCEDURE — 6360000002 HC RX W HCPCS: Performed by: PHYSICIAN ASSISTANT

## 2024-04-16 PROCEDURE — 2060000000 HC ICU INTERMEDIATE R&B

## 2024-04-16 PROCEDURE — 99222 1ST HOSP IP/OBS MODERATE 55: CPT | Performed by: INTERNAL MEDICINE

## 2024-04-16 PROCEDURE — 6370000000 HC RX 637 (ALT 250 FOR IP): Performed by: INTERNAL MEDICINE

## 2024-04-16 PROCEDURE — 97116 GAIT TRAINING THERAPY: CPT

## 2024-04-16 PROCEDURE — 6370000000 HC RX 637 (ALT 250 FOR IP)

## 2024-04-16 PROCEDURE — 97530 THERAPEUTIC ACTIVITIES: CPT

## 2024-04-16 RX ADMIN — HYDROMORPHONE HYDROCHLORIDE 1 MG: 1 INJECTION, SOLUTION INTRAMUSCULAR; INTRAVENOUS; SUBCUTANEOUS at 23:27

## 2024-04-16 RX ADMIN — NALOXEGOL OXALATE 25 MG: 25 TABLET, FILM COATED ORAL at 08:05

## 2024-04-16 RX ADMIN — HYDROMORPHONE HYDROCHLORIDE 1 MG: 1 INJECTION, SOLUTION INTRAMUSCULAR; INTRAVENOUS; SUBCUTANEOUS at 12:40

## 2024-04-16 RX ADMIN — OXYCODONE 5 MG: 5 TABLET ORAL at 22:02

## 2024-04-16 RX ADMIN — CEFTRIAXONE SODIUM 2000 MG: 2 INJECTION, POWDER, FOR SOLUTION INTRAMUSCULAR; INTRAVENOUS at 17:41

## 2024-04-16 RX ADMIN — DULOXETINE HYDROCHLORIDE 20 MG: 20 CAPSULE, DELAYED RELEASE ORAL at 22:03

## 2024-04-16 RX ADMIN — BACLOFEN 10 MG: 10 TABLET ORAL at 11:09

## 2024-04-16 RX ADMIN — POTASSIUM BICARBONATE 40 MEQ: 782 TABLET, EFFERVESCENT ORAL at 23:27

## 2024-04-16 RX ADMIN — CEFTRIAXONE SODIUM 2000 MG: 2 INJECTION, POWDER, FOR SOLUTION INTRAMUSCULAR; INTRAVENOUS at 08:03

## 2024-04-16 RX ADMIN — HYDROMORPHONE HYDROCHLORIDE 1 MG: 1 INJECTION, SOLUTION INTRAMUSCULAR; INTRAVENOUS; SUBCUTANEOUS at 00:13

## 2024-04-16 RX ADMIN — BACLOFEN 10 MG: 10 TABLET ORAL at 14:52

## 2024-04-16 RX ADMIN — DOCUSATE SODIUM 100 MG: 100 CAPSULE, LIQUID FILLED ORAL at 11:17

## 2024-04-16 RX ADMIN — AMLODIPINE BESYLATE 5 MG: 5 TABLET ORAL at 11:10

## 2024-04-16 RX ADMIN — HYDROMORPHONE HYDROCHLORIDE 1 MG: 1 INJECTION, SOLUTION INTRAMUSCULAR; INTRAVENOUS; SUBCUTANEOUS at 04:44

## 2024-04-16 RX ADMIN — OXYCODONE 5 MG: 5 TABLET ORAL at 08:08

## 2024-04-16 RX ADMIN — POLYETHYLENE GLYCOL 3350 17 G: 17 POWDER, FOR SOLUTION ORAL at 11:13

## 2024-04-16 RX ADMIN — BACLOFEN 10 MG: 10 TABLET ORAL at 22:03

## 2024-04-16 RX ADMIN — SODIUM CHLORIDE, PRESERVATIVE FREE 10 ML: 5 INJECTION INTRAVENOUS at 22:03

## 2024-04-16 RX ADMIN — HYDROMORPHONE HYDROCHLORIDE 1 MG: 1 INJECTION, SOLUTION INTRAMUSCULAR; INTRAVENOUS; SUBCUTANEOUS at 10:55

## 2024-04-16 RX ADMIN — FAMOTIDINE 20 MG: 20 TABLET, FILM COATED ORAL at 11:17

## 2024-04-16 RX ADMIN — ROPINIROLE HYDROCHLORIDE 1 MG: 1 TABLET, FILM COATED ORAL at 22:03

## 2024-04-16 RX ADMIN — SODIUM CHLORIDE, PRESERVATIVE FREE 10 ML: 5 INJECTION INTRAVENOUS at 11:12

## 2024-04-16 ASSESSMENT — PAIN DESCRIPTION - ORIENTATION
ORIENTATION: RIGHT
ORIENTATION: LOWER
ORIENTATION: LOWER
ORIENTATION: MID;LOWER
ORIENTATION: MID
ORIENTATION: MID

## 2024-04-16 ASSESSMENT — PAIN DESCRIPTION - DESCRIPTORS
DESCRIPTORS: ACHING;DISCOMFORT
DESCRIPTORS: DISCOMFORT;ACHING
DESCRIPTORS: ACHING
DESCRIPTORS: ACHING
DESCRIPTORS: SHARP
DESCRIPTORS: SHARP

## 2024-04-16 ASSESSMENT — PAIN DESCRIPTION - LOCATION
LOCATION: BACK
LOCATION: BACK
LOCATION: BACK;LEG
LOCATION: BACK

## 2024-04-16 ASSESSMENT — PAIN - FUNCTIONAL ASSESSMENT
PAIN_FUNCTIONAL_ASSESSMENT: PREVENTS OR INTERFERES SOME ACTIVE ACTIVITIES AND ADLS

## 2024-04-16 ASSESSMENT — PAIN SCALES - GENERAL
PAINLEVEL_OUTOF10: 6
PAINLEVEL_OUTOF10: 0
PAINLEVEL_OUTOF10: 10
PAINLEVEL_OUTOF10: 5
PAINLEVEL_OUTOF10: 7
PAINLEVEL_OUTOF10: 6
PAINLEVEL_OUTOF10: 6
PAINLEVEL_OUTOF10: 7
PAINLEVEL_OUTOF10: 7

## 2024-04-16 NOTE — PROGRESS NOTES
Hospitalist Progress Note      Patient:  Alpa Marte    Unit/Bed:4B-08/008-A  YOB: 1955  MRN: [id number]   Acct: [id number]   PCP: Connie Augustin PA  Date of Admission: 3/26/2024    Assessment/Plan:    #Meningitis and bacteremia.  -CSF molecular PCR 4/2 significant for E. coli K1.  Negative streptococci, negative Listeria. 19,780 leukocyte/cc CSF.  Patient encephalopathic beginning 4/2.    -No fever, nuchal rigidity. No photophobia.    -Empiric vancomycin, ampicillin discontinued 4/3.   - Cultures show sensitivity to ceftriaxone.   -S/p 4 intrathecal doses of gentamicin.    -Status post lumbar drain, removed 4/8.  -ID following.Continue with ceftriaxone.  -Recent back surgery complicated with a dural leak.Revision surgery was done with dura repair.Drain was removed today.  -Neurosurgery following  -CSF cultures.  NGTD.Blood culture no growth to date.    #Acute left cerebellar hemorrhage.  - Stable on CT head 4/1.   -On 3/31, 12 x 7 mm area of acute hemorrhage left cerebellar hemisphere, with bilateral tentorium cerebri hemorrhage noted on MRI.    -Neurology signed off, defer to neurosurgery.   -Wound VAC removed 3/29.  S/p dexamethasone.  -Neurosurgery following.As per neuro surgery :  -Surgical drain removed today. Surgical dressings replaced with dry gauze.  - Additional dressings changes every 2-3 days or as needed.   - Can begin to ambulate with PT/OT with brace  - L cerebellar hemorrhage initially noted on 3/31, stable CTH on 4/1  - Will need OP follow up in neurosurgery office for suture removal POD #14 (4/25)  -PT/ OT following      #Hyponatremia   -Patient has low oral intake.  Sodium from 4/8-4/11 between 131 134.    -She has had oscillating serum sodiums. 4/8, sodium 128 from prior 134.    -Urine sodium 105, unlikely SIADH, a.m. cortisol drawn approximately 8 AM 4/9 WNL.  -Labs not done.Ordered for AM.    #Acute

## 2024-04-16 NOTE — CARE COORDINATION
4/16/24, 2:48 PM EDT    DISCHARGE ON GOING EVALUATION    [facility] day: 21  Location: -08/008-A Reason for admit: Adjacent segment disease of lumbar spine with history of fusion procedure [M51.36, Z98.1]  S/P lumbar fusion [Z98.1]  Meningitis [G03.9]     Procedures:   3/26 OR with Dr. Caldera: Reexploration and Revision of Lumbar Instrumented Fusion for Extension of the Decompression and Instrumentation to L3-Pelvis    3/29 lumbar drain removed  4/1 Lumbar CSF drain placed  4/3 CSF Lumbar drain replaced in IR  4/8 Lumbar drain removed  4/11 OR with Dr. Caldera: Lumbar wound re-exploration and revision for dural repair of CSF leak.    Imaging since last note: None    Barriers to Discharge: Hospitalist, ID, Neurosurgery following. POD #21 and POD #5. CSF culture: positive E.Coli. PT/OT/SLP. Rocephin iv q12hr. Movantik. Pain and nausea control. Telesitter. Precert to [facility] started 4/15.     PCP: Connie Augustin PA  Readmission Risk Score: 8.2%    Patient Goals/Plan/Treatment Preferences: From home. Planning [place], precert started yesterday. Per [facility] Liaison, planning P2P.

## 2024-04-16 NOTE — PLAN OF CARE
Restraint type: Soft restraint:  right wrist and left wrist  Reason for restraints: Poor safety judgment:  Cognitive impairment causes forgetfulness to apply wheelchair brakes during transfer attempts and Pulling out devices:  Pulling tubes  Duration: 24 hours    Restraints must be removed when an alternative is available and effective and/or patient no longer meets criteria.    Orders must be renewed every calendar day or when discontinued.    The MD must conduct a face to face assessment within 1 calendar day of initiation when initial restraint order is verbal.

## 2024-04-16 NOTE — PROGRESS NOTES
Progress note: Infectious diseases    Patient - Alpa Marte,  Age - 68 y.o.    - 1955      Room Number - 4B-08/008-A   MRN -  [id number]   [facility] # - [id number]  Date of Admission -  3/26/2024  7:58 AM    SUBJECTIVE:   No new issues.   OBJECTIVE   VITALS    height is 1.651 m (5' 5\") and weight is 44.3 kg (97 lb 10.6 oz). Her oral temperature is 98.2 °F (36.8 °C). Her blood pressure is 145/71 (abnormal) and her pulse is 100. Her respiration is 16 and oxygen saturation is 99%.       Wt Readings from Last 3 Encounters:   04/15/24 44.3 kg (97 lb 10.6 oz)   24 48.6 kg (107 lb 2.3 oz)   01/15/24 48.6 kg (107 lb 3.2 oz)       I/O (24 Hours)    Intake/Output Summary (Last 24 hours) at 2024 0919  Last data filed at 2024 0600  Gross per 24 hour   Intake 320 ml   Output 1325 ml   Net -1005 ml         General Appearance awake  HEENT - normocephalic, atraumatic, pink conjunctiva,  anicteric sclera  Neck - Supple, no mass  Lungs -  Bilateral   air entry, no rhonchi, no wheeze  Cardiovascular - Heart sounds are normal.     Abdomen - soft, not distended, nontender.     Neurologic -confused  Skin - No bruising or bleeding  Extremities - No edema, no cyanosis, clubbing   Dressed lumbar wound.(Post surgery)drains removed.    MEDICATIONS:      docusate sodium  100 mg Oral Daily    baclofen  10 mg Oral TID    sodium chloride flush  5-40 mL IntraVENous 2 times per day    naloxegol  25 mg Oral QAM AC    polyethylene glycol  17 g Oral BID    cefTRIAXone (ROCEPHIN) IV  2,000 mg IntraVENous Q12H    DULoxetine  20 mg Oral Nightly    rOPINIRole  1 mg Oral Nightly    lidocaine  3 patch TransDERmal Daily    senna  2 tablet Oral Nightly    famotidine  20 mg Oral Daily    amLODIPine  5 mg Oral Daily    [Held by provider] gabapentin  600 mg Oral QAM    And    [Held by provider] gabapentin  900 mg Oral Nightly    sodium chloride flush   5-40 mL IntraVENous 2 times per day      sodium chloride      sodium chloride 0 mL/hr at 04/09/24 1721     sodium chloride flush, sodium chloride, potassium chloride **OR** potassium alternative oral replacement **OR** potassium chloride, HYDROmorphone **OR** HYDROmorphone, oxyCODONE, acetaminophen, naloxone, [Held by provider] HYDROcodone 5 mg - acetaminophen **OR** [Held by provider] HYDROcodone 5 mg - acetaminophen, hydrALAZINE, ondansetron, labetalol, sodium chloride flush, sodium chloride         Problem list of patient:     Patient Active Problem List   Diagnosis Code    Status post lumbar spinal fusion Z98.1    Moderate malnutrition (HCC) E44.0    Transient alteration of awareness R40.4    Intracranial bleeding (HCC) I62.9    Cerebellar hemorrhage (HCC) I61.4    Severe malnutrition (HCC) E43    Meningitis G03.9    Post-op pain G89.18         ASSESSMENT/PLAN   E. coli bacteremia with meningitis: Continue Rocephin every 12 hours.  Recent back surgery complicated with a dural leak:  she had revision with dura repair    Severe malnutrition  Continue iv antibiotic    Jag Melendez MD, 4/16/2024 9:19 AM

## 2024-04-16 NOTE — PROGRESS NOTES
Instrumentation to L3-Pelvis. Pt was noted to have dura leak during surgery and has been on bedrest since surgery on 3/26/24-- 3/31- MRI- Brain noted with acute hemorrhage left cerebellar hemisphere-  4/2 lumbar drain placed due to dura leak. Pt is s/p Lumbar wound re-exploration and revision for dural repair of CSF leak completed by Dr. Santos on 4/11. Pt on bedrest for 48 hours following. OK to see 4/13 in PM or later.     Prior Level of Function:  Lives With: Alone  Type of Home: Trailer  Home Layout: One level  Home Access: Stairs to enter with rails  Home Equipment: Cane, Walker, rolling   Bathroom Shower/Tub: Tub/Shower unit  Bathroom Toilet: Standard  Bathroom Equipment: Tub transfer bench  Bathroom Accessibility: Accessible    Receives Help From: Friend(s)  ADL Assistance: Independent  Homemaking Assistance: Independent  Homemaking Responsibilities: Yes  Ambulation Assistance: Independent  Transfer Assistance: Independent  Active : Yes    Restrictions/Precautions:  Restrictions/Precautions: General Precautions, Fall Risk, Surgical Protocols  Required Braces or Orthoses  Spinal: Lumbar Corset  Position Activity Restriction  Spinal Precautions: No Bending, No Lifting, No Twisting  Other position/activity restrictions: + CVA, monitor for HA,       SUBJECTIVE: pt in bed on arrival and cont to be confused she [place] and needed reoriented throughout session - pt needed cues for back precautions - she cont to demonstrate increased pain behaviors w/ mobility and very restless while sitting edge of bed and cahir     PAIN: 6/10: after mobility- pt was a little restless upon arrival in bed we worked on deep breathing - pt had IV pain meds prior to session and nursing reported that she is up to date on all her pain meds     Vitals: Vitals not assessed per clinical judgement, see nursing flowsheet    OBJECTIVE:  Bed Mobility:  Rolling to Left: Minimal Assistance, with rail, pt took 10 min  to get her to roll    Rolling to Right: Minimal Assistance   Supine to Sit: Maximum Assistance, pt lacking initiation due to pain   Sit to Supine: Maximum Assistance     Transfers:  Sit to Stand: Moderate Assistance, poor carryover w/ hand placement and took > 1 attempt to stand 3 trials   Stand to Sit:Minimal Assistance    Ambulation:  Minimal Assistance to mod assist   Distance: 4 feet x 2  Surface: Level Tile  Device:Rolling Walker  Gait Deviations:  slow star w/ narrow base of support, uneven step length, pt stopping mid stance and at times would draw her leg up c/o of pain shooting down her Leg this happened on both LEs     Balance:  Sitting edge of bed unsupported with CGA to min assist- pt demonstrating increased pain while sitting edge of bed and demonstrated post lean and loss of balance- she was dependent to ravi her brace   - pt sitting up in recliener x 5 min and unable to get her comfortable as she was restless and rocking so assisted her back to bed   Standing at walker with min assist worked on wt shifting and controlled stepping noted decreased following directions and she would drawl her leg up in flexion when having increased pain, pt was incont of BM and was dependent for elaina care   Exercise:  Patient was guided in 1 set(s) 10 reps of exercise to both lower extremities.  Ankle pumps, Heelslides, and took extended time to complete due to pain and pt distracted - worked on breathing technique to assist  .  Exercises were completed for increased independence with functional mobility.    Functional Outcome Measures: Completed     Modified Judy Scale:  +4 - Moderately severe disability; unable to walk and attend to bodily needs without assistance.   Patient could not live alone and could not walk from one room to another without physical help from another person, but they can sit up in bed without any help.  Education provided regarding stroke rehabilitation

## 2024-04-17 PROCEDURE — 6360000002 HC RX W HCPCS: Performed by: PHYSICIAN ASSISTANT

## 2024-04-17 PROCEDURE — 2580000003 HC RX 258: Performed by: PHYSICIAN ASSISTANT

## 2024-04-17 PROCEDURE — 97530 THERAPEUTIC ACTIVITIES: CPT

## 2024-04-17 PROCEDURE — 97110 THERAPEUTIC EXERCISES: CPT

## 2024-04-17 PROCEDURE — 6370000000 HC RX 637 (ALT 250 FOR IP): Performed by: PHYSICIAN ASSISTANT

## 2024-04-17 PROCEDURE — 6370000000 HC RX 637 (ALT 250 FOR IP): Performed by: INTERNAL MEDICINE

## 2024-04-17 PROCEDURE — 6370000000 HC RX 637 (ALT 250 FOR IP)

## 2024-04-17 PROCEDURE — 99232 SBSQ HOSP IP/OBS MODERATE 35: CPT | Performed by: INTERNAL MEDICINE

## 2024-04-17 PROCEDURE — 97116 GAIT TRAINING THERAPY: CPT

## 2024-04-17 PROCEDURE — 2060000000 HC ICU INTERMEDIATE R&B

## 2024-04-17 RX ADMIN — HYDROMORPHONE HYDROCHLORIDE 1 MG: 1 INJECTION, SOLUTION INTRAMUSCULAR; INTRAVENOUS; SUBCUTANEOUS at 19:59

## 2024-04-17 RX ADMIN — HYDROMORPHONE HYDROCHLORIDE 1 MG: 1 INJECTION, SOLUTION INTRAMUSCULAR; INTRAVENOUS; SUBCUTANEOUS at 10:22

## 2024-04-17 RX ADMIN — HYDROMORPHONE HYDROCHLORIDE 1 MG: 1 INJECTION, SOLUTION INTRAMUSCULAR; INTRAVENOUS; SUBCUTANEOUS at 03:33

## 2024-04-17 RX ADMIN — BACLOFEN 10 MG: 10 TABLET ORAL at 09:21

## 2024-04-17 RX ADMIN — DOCUSATE SODIUM 100 MG: 100 CAPSULE, LIQUID FILLED ORAL at 09:21

## 2024-04-17 RX ADMIN — SODIUM CHLORIDE, PRESERVATIVE FREE 10 ML: 5 INJECTION INTRAVENOUS at 19:59

## 2024-04-17 RX ADMIN — BACLOFEN 10 MG: 10 TABLET ORAL at 14:20

## 2024-04-17 RX ADMIN — AMLODIPINE BESYLATE 5 MG: 5 TABLET ORAL at 09:21

## 2024-04-17 RX ADMIN — SENNOSIDES 17.2 MG: 8.6 TABLET ORAL at 19:59

## 2024-04-17 RX ADMIN — ONDANSETRON 4 MG: 2 INJECTION INTRAMUSCULAR; INTRAVENOUS at 09:47

## 2024-04-17 RX ADMIN — CEFTRIAXONE SODIUM 2000 MG: 2 INJECTION, POWDER, FOR SOLUTION INTRAMUSCULAR; INTRAVENOUS at 17:12

## 2024-04-17 RX ADMIN — BACLOFEN 10 MG: 10 TABLET ORAL at 19:59

## 2024-04-17 RX ADMIN — ROPINIROLE HYDROCHLORIDE 1 MG: 1 TABLET, FILM COATED ORAL at 19:59

## 2024-04-17 RX ADMIN — DULOXETINE HYDROCHLORIDE 20 MG: 20 CAPSULE, DELAYED RELEASE ORAL at 19:59

## 2024-04-17 RX ADMIN — SODIUM CHLORIDE, PRESERVATIVE FREE 10 ML: 5 INJECTION INTRAVENOUS at 09:22

## 2024-04-17 RX ADMIN — NALOXEGOL OXALATE 25 MG: 25 TABLET, FILM COATED ORAL at 09:21

## 2024-04-17 RX ADMIN — ONDANSETRON 4 MG: 2 INJECTION INTRAMUSCULAR; INTRAVENOUS at 19:59

## 2024-04-17 RX ADMIN — CEFTRIAXONE SODIUM 2000 MG: 2 INJECTION, POWDER, FOR SOLUTION INTRAMUSCULAR; INTRAVENOUS at 05:45

## 2024-04-17 RX ADMIN — FAMOTIDINE 20 MG: 20 TABLET, FILM COATED ORAL at 09:21

## 2024-04-17 ASSESSMENT — PAIN SCALES - GENERAL
PAINLEVEL_OUTOF10: 0
PAINLEVEL_OUTOF10: 9
PAINLEVEL_OUTOF10: 10
PAINLEVEL_OUTOF10: 7
PAINLEVEL_OUTOF10: 7

## 2024-04-17 ASSESSMENT — PAIN DESCRIPTION - DESCRIPTORS
DESCRIPTORS: ACHING
DESCRIPTORS: ACHING;DISCOMFORT
DESCRIPTORS: PENETRATING;SHARP
DESCRIPTORS: SHARP

## 2024-04-17 ASSESSMENT — PAIN DESCRIPTION - LOCATION
LOCATION: BACK

## 2024-04-17 ASSESSMENT — PAIN - FUNCTIONAL ASSESSMENT
PAIN_FUNCTIONAL_ASSESSMENT: PREVENTS OR INTERFERES SOME ACTIVE ACTIVITIES AND ADLS

## 2024-04-17 ASSESSMENT — PAIN DESCRIPTION - FREQUENCY: FREQUENCY: CONTINUOUS

## 2024-04-17 ASSESSMENT — PAIN DESCRIPTION - ORIENTATION
ORIENTATION: LOWER
ORIENTATION: MID;LOWER

## 2024-04-17 ASSESSMENT — PAIN SCALES - WONG BAKER: WONGBAKER_NUMERICALRESPONSE: HURTS EVEN MORE

## 2024-04-17 ASSESSMENT — PAIN DESCRIPTION - ONSET: ONSET: ON-GOING

## 2024-04-17 ASSESSMENT — PAIN DESCRIPTION - PAIN TYPE: TYPE: SURGICAL PAIN

## 2024-04-17 NOTE — PLAN OF CARE
Problem: Discharge Planning  Goal: Discharge to home or other facility with appropriate resources  Outcome: Progressing  Flowsheets (Taken 4/17/2024 0247)  Discharge to home or other facility with appropriate resources: Identify barriers to discharge with patient and caregiver     Problem: Pain  Goal: Verbalizes/displays adequate comfort level or baseline comfort level  Outcome: Progressing  Flowsheets (Taken 4/17/2024 0247)  Verbalizes/displays adequate comfort level or baseline comfort level: Encourage patient to monitor pain and request assistance     Problem: ABCDS Injury Assessment  Goal: Absence of physical injury  Outcome: Progressing  Flowsheets (Taken 4/17/2024 0247)  Absence of Physical Injury: Implement safety measures based on patient assessment     Problem: Safety - Adult  Goal: Free from fall injury  Outcome: Progressing  Flowsheets (Taken 4/17/2024 0247)  Free From Fall Injury: Instruct family/caregiver on patient safety     Problem: Nutrition Deficit:  Goal: Optimize nutritional status  Outcome: Progressing  Flowsheets (Taken 4/17/2024 0247)  Nutrient intake appropriate for improving, restoring, or maintaining nutritional needs: Assess nutritional status and recommend course of action     Problem: Skin/Tissue Integrity  Goal: Absence of new skin breakdown  Description: 1.  Monitor for areas of redness and/or skin breakdown  2.  Assess vascular access sites hourly  3.  Every 4-6 hours minimum:  Change oxygen saturation probe site  4.  Every 4-6 hours:  If on nasal continuous positive airway pressure, respiratory therapy assess nares and determine need for appliance change or resting period.  Outcome: Progressing     Problem: Confusion  Goal: Confusion, delirium, dementia, or psychosis is improved or at baseline  Description: INTERVENTIONS:  1. Assess for possible contributors to thought disturbance, including medications, impaired vision or hearing, underlying metabolic abnormalities, dehydration,  neurological status     Problem: Skin/Tissue Integrity - Adult  Goal: Incisions, wounds, or drain sites healing without S/S of infection  Outcome: Progressing  Flowsheets (Taken 4/17/2024 0247)  Incisions, Wounds, or Drain Sites Healing Without Sign and Symptoms of Infection: TWICE DAILY: Assess and document skin integrity     Problem: Safety - Medical Restraint  Goal: Remains free of injury from restraints (Restraint for Interference with Medical Device)  Description: INTERVENTIONS:  1. Determine that other, less restrictive measures have been tried or would not be effective before applying the restraint  2. Evaluate the patient's condition at the time of restraint application  3. Inform patient/family regarding the reason for restraint  4. Q2H: Monitor safety, psychosocial status, comfort, nutrition and hydration  Outcome: Completed

## 2024-04-17 NOTE — CARE COORDINATION
4/17/24, 12:02 PM EDT    DISCHARGE PLANNING EVALUATION     Pc received from [facility] SAUCED[facility], states precert denied for [facility], decision made to start appeal process. Back up plan is [facility] or [facility]. LANCE left detailed update for Estrella at [facility].    LANCE called [facility] again and left message to return call.  LANCE called Olga with [facility], no answer, will try again later.   LANCE spoke with Olga, she will review for [facility] or [facility]. Olga is aware that precert needs started if one accepts.

## 2024-04-17 NOTE — PLAN OF CARE
Progressing  Flowsheets (Taken 4/17/2024 0247 by Sharon Kennedy RN)  Free From Fall Injury: Instruct family/caregiver on patient safety  4/17/2024 0247 by Sharon Kennedy RN  Outcome: Progressing  Flowsheets (Taken 4/17/2024 0247)  Free From Fall Injury: Instruct family/caregiver on patient safety     Problem: Nutrition Deficit:  Goal: Optimize nutritional status  4/17/2024 1413 by Ramya Evans RN  Outcome: Progressing  Flowsheets (Taken 4/17/2024 1413)  Nutrient intake appropriate for improving, restoring, or maintaining nutritional needs:   Assess nutritional status and recommend course of action   Monitor oral intake, labs, and treatment plans   Recommend appropriate diets, oral nutritional supplements, and vitamin/mineral supplements  4/17/2024 0247 by Sharon Kennedy RN  Outcome: Progressing  Flowsheets (Taken 4/17/2024 0247)  Nutrient intake appropriate for improving, restoring, or maintaining nutritional needs: Assess nutritional status and recommend course of action     Problem: Skin/Tissue Integrity  Goal: Absence of new skin breakdown  Description: 1.  Monitor for areas of redness and/or skin breakdown  2.  Assess vascular access sites hourly  3.  Every 4-6 hours minimum:  Change oxygen saturation probe site  4.  Every 4-6 hours:  If on nasal continuous positive airway pressure, respiratory therapy assess nares and determine need for appliance change or resting period.  4/17/2024 1413 by Ramya Evans RN  Outcome: Not Progressing  4/17/2024 0247 by Sharon Kennedy RN  Outcome: Progressing     Problem: Confusion  Goal: Confusion, delirium, dementia, or psychosis is improved or at baseline  Description: INTERVENTIONS:  1. Assess for possible contributors to thought disturbance, including medications, impaired vision or hearing, underlying metabolic abnormalities, dehydration, psychiatric diagnoses, and notify attending LIP  2. [place] high risk fall precautions, as indicated  3.  psychiatric diagnoses, notify LIP   [place] high risk fall precautions, as indicated   Provide frequent short contacts to provide reality reorientation, refocusing and direction   Decrease environmental stimuli, including noise as appropriate   Monitor and intervene to maintain adequate nutrition, hydration, elimination, sleep and activity     Care plan reviewed with patient.  Patient not able to  verbalize understanding of the plan of care and contribute to goal setting due to confusion

## 2024-04-17 NOTE — PROGRESS NOTES
Progress note: Infectious diseases    Patient - Alpa Marte,  Age - 68 y.o.    - 1955      Room Number - 4B-08/008-A   MRN -  [id number]   [facility] # - [id number]  Date of Admission -  3/26/2024  7:58 AM    SUBJECTIVE:   She has no new complaints  OBJECTIVE   VITALS    height is 1.651 m (5' 5\") and weight is 42.2 kg (93 lb). Her oral temperature is 97.6 °F (36.4 °C). Her blood pressure is 103/56 (abnormal) and her pulse is 98. Her respiration is 18 and oxygen saturation is 97%.       Wt Readings from Last 3 Encounters:   24 42.2 kg (93 lb)   24 48.6 kg (107 lb 2.3 oz)   01/15/24 48.6 kg (107 lb 3.2 oz)       I/O (24 Hours)    Intake/Output Summary (Last 24 hours) at 2024 1221  Last data filed at 2024 1113  Gross per 24 hour   Intake 120 ml   Output 1050 ml   Net -930 ml         General Appearance awake  HEENT - normocephalic, atraumatic, pink conjunctiva,  anicteric sclera  Neck - Supple, no mass  Lungs -  Bilateral   air entry, no rhonchi, no wheeze  Cardiovascular - Heart sounds are normal.     Abdomen - soft, not distended, nontender.     Neurologic -confused  Skin - No bruising or bleeding  Extremities - No edema, no cyanosis, clubbing   Dressed lumbar wound.(Post surgery)   Back brace     MEDICATIONS:      docusate sodium  100 mg Oral Daily    baclofen  10 mg Oral TID    sodium chloride flush  5-40 mL IntraVENous 2 times per day    naloxegol  25 mg Oral QAM AC    polyethylene glycol  17 g Oral BID    cefTRIAXone (ROCEPHIN) IV  2,000 mg IntraVENous Q12H    DULoxetine  20 mg Oral Nightly    rOPINIRole  1 mg Oral Nightly    lidocaine  3 patch TransDERmal Daily    senna  2 tablet Oral Nightly    famotidine  20 mg Oral Daily    amLODIPine  5 mg Oral Daily    [Held by provider] gabapentin  600 mg Oral QAM    And    [Held by provider] gabapentin  900 mg Oral Nightly    sodium chloride flush  5-40 mL

## 2024-04-17 NOTE — CARE COORDINATION
4/17/24, 7:44 AM EDT    DISCHARGE ON GOING EVALUATION    [facility] day: 22  Location: -08/008-A Reason for admit: Adjacent segment disease of lumbar spine with history of fusion procedure [M51.36, Z98.1]  S/P lumbar fusion [Z98.1]  Meningitis [G03.9]     Procedures:   3/26 OR with Dr. Caldera: Reexploration and Revision of Lumbar Instrumented Fusion for Extension of the Decompression and Instrumentation to L3-Pelvis    3/29 lumbar drain removed  4/1 Lumbar CSF drain placed  4/3 CSF Lumbar drain replaced in IR  4/8 Lumbar drain removed  4/11 OR with Dr. Caldera: Lumbar wound re-exploration and revision for dural repair of CSF leak.    Imaging since last note: None    Barriers to Discharge: Hospitalist, ID, Neurosurgery following. POD #22 and POD #6. CSF culture: positive E.Coli. PT/OT/SLP. Rocephin iv q12hr. Movantik. Pain control. Awaiting precert to LTACH.     PCP: Connie Augustin PA  Readmission Risk Score: 6.6%    Patient Goals/Plan/Treatment Preferences: From home. Planning [facility], precert started yesterday, denied. Per Geronimo Liasudeep, planning P2P.     11:54 AM EDT  Received message from Geronimo Sharma that the P2P upheld the denial. Spoke with LANCE Merritt, feels pt is having pain control issues and we should proceed with expedited appeal. Updated Edith.     12:19 PM EDT  Spoke with Care Coordination Director, Margaret. Feel we should move forward with SNF. Updated Irma.

## 2024-04-17 NOTE — PROGRESS NOTES
[facility]  INPATIENT PHYSICAL THERAPY  DAILY NOTE  [facility] CVICU 4B - 4B-08/008-A      Time In: 0850  Time Out: 0932  Timed Code Treatment Minutes: 42 Minutes  Minutes: 42          Date: 2024  Patient Name: Alpa Marte,  Gender:  female        MRN: [id number]  : 1955  (68 y.o.)     Referring Practitioner: Santino Stewart PA-C  Diagnosis: Adjacent segment disease of lumbar spine with history of fusion procedure  Additional Pertinent Hx: Per HPI, \"Alpa Marte is an 68 y.o.  female, an every day smoker tobacco and 1/2 pack day history who admits to regular alcohol use and has a medical history significant for restless leg syndrome and urinary incontinence with a surgical history that includes prior lumbar fusions in  and again in  performed at [facility] resulting in pedicle screw and garima instrumentation at lumbar 4 spanning to S1.  She presents today unaccompanied and ambulating without assistance with complaints of chronic worsening low back pain radiating primarily to the left lower extremity all the way to the foot including chronic weakness and chronic partial left foot drop.  She does not take any medication other than over-the-counter medications and treats with ice and heat and rest.  In the past she has been treated by pain specialist who provided some injection therapies (greater than 20 years prior) that provided only transient relief. A CT scan imaged on 2024 that reflect pedicle screws from L4-S1 along with laminectomies and a grade 3 anterior listhesis of L5 on S1 unchanged. The flexion-extension x-ray also shows the grade 3 to grade 4 anterior listhesis of L5 on S1 with no evidence of additional change in flexion or extension. SI joint imaging reveals some narrowing along with sclerosis for SI joints bilaterally.\"  Pt is now s/p Reexploration and Revision of Lumbar Instrumented Fusion for Extension of the Decompression and  per clinical judgement, see nursing flowsheet    OBJECTIVE:  Bed Mobility:  Rolling to Right: Minimal Assistance, with rail, with verbal cues    Supine to Sit: Minimal Assistance, with rail, with verbal cues   Scooting: Minimal Assistance    Transfers:  Sit to Stand: Minimal Assistance  Stand to Sit:Minimal Assistance  Cues for safety and hand placement noted decreased carryover   Ambulation:  Minimal Assistance  Distance: 8 feet x 1, 2 feet x 2  Surface: Level Tile  Device:Rolling Walker  Gait Deviations:  Slow Whitney and uneven step length w/ decreased heel strike noted decreased quad stability, pt needing assist to guide the walker, pt unable to step up onto 2 in scale safely     Balance:  Sitting edge of bed with CGA to SBA - she cont to demonstrate pain behaviors but improved from previous dates she will rock back and fort and lift her feet up off floor- pt was dependent to ravi her back brace  Standing at sale with UE at support pt needed CGA - pt unable to safely release an UE to assist w/ clothing management or elaina care and needed min assist for balance while assist to complete these tasks- pt left up in chair w/ call light in reach and chair alarm in place   Exercise:  Patient was guided in 1 set(s) 1 reps of exercise to both lower extremities.  Ankle pumps, Heelslides, Short arc quads, and stretched bambi heelcords 3x30 sec  .  Exercises were completed for increased independence with functional mobility.    Functional Outcome Measures: Completed  -PAC Inpatient Mobility Raw Score : 15  -PAC Inpatient T-Scale Score : 39.45  Modified [place]o Scale:  +4 - Moderately severe disability; unable to walk and attend to bodily needs without assistance.   Patient could not live alone and could not walk from one room to another without physical help from another person, but they can sit up in bed without any help.  Education provided regarding stroke rehabilitation management.    ASSESSMENT:  Assessment:  pt cont

## 2024-04-17 NOTE — PROGRESS NOTES
Progress Note    Patient:  Alpa Marte    Unit/Bed:4B-08/008-A  YOB: 1955  MRN: [id number]   Acct: [id number]   Admit date: 3/26/2024      Principal Problem:    Status post lumbar spinal fusion  Active Problems:    Transient alteration of awareness    Intracranial bleeding (HCC)    Cerebellar hemorrhage (HCC)    Severe malnutrition (HCC)    Meningitis    Post-op pain  Resolved Problems:    * No resolved hospital problems. *      Disposition continued inpatient care but possible IPR approval?  Patient also states she vomited yesterday patient recently pulled out NG tube as well    Assessment and Plan:  Meningitis and bacteremia improving follow infectious disease recommendations continue Rocephin IV  Acute left cerebellar hemorrhage stable I will likely resume this patient's anticoagulation with either heparin or Lovenox if not already done so they will prevent deep venous thrombosis provide it has been at least 14 days since previous bleed  Hyponatremia acute mild  Protein calorie malnutrition with BMI 15.4 kg/m²  Acute encephalopathy resolved at the time my physical survey  Lumbar fusion L3 to sacrum duraplasty revision 4/11/2024 with drain in place followed by infectious disease  Hypertensive urgency blood pressures appear to be stable on current regimen  Restless leg syndrome        Patient Seen, Chart, Consults notes, Labs, Radiology studies reviewed.    Subjective: Day 22 of stay with hospital    chronic lumbar back pain, has had previous surgery and lumbar fusions in 1989, 2003 at [facility].  Most recently had similar procedure done by Dr. Caldera on 3/26.  Patient developed episodes of staring spells and twitching, and shortly thereafter had EEG 3/29 showing abnormality.  On 3/30 had CT head showing indistinct hyperattenuation left occipital lobe suspicious for parenchymal hemorrhage.  Follow-up MRI then showed 12 x 7 mm area of acute hemorrhage in left cerebellar                            [] SQ Heparin                                 [] Encourage ambulation           [] Already on Anticoagulation        Further chart review to ensure there is no contraindication to initiating heparin or Lovenox               Electronically signed by Tevin Goins MD on 4/17/2024 at 7:23 PM    [facility]

## 2024-04-18 PROCEDURE — 6360000002 HC RX W HCPCS: Performed by: PHYSICIAN ASSISTANT

## 2024-04-18 PROCEDURE — C1751 CATH, INF, PER/CENT/MIDLINE: HCPCS

## 2024-04-18 PROCEDURE — 6370000000 HC RX 637 (ALT 250 FOR IP): Performed by: PHYSICIAN ASSISTANT

## 2024-04-18 PROCEDURE — 99232 SBSQ HOSP IP/OBS MODERATE 35: CPT | Performed by: INTERNAL MEDICINE

## 2024-04-18 PROCEDURE — 2580000003 HC RX 258: Performed by: PHYSICIAN ASSISTANT

## 2024-04-18 PROCEDURE — 05H933Z INSERTION OF INFUSION DEVICE INTO RIGHT BRACHIAL VEIN, PERCUTANEOUS APPROACH: ICD-10-PCS | Performed by: INTERNAL MEDICINE

## 2024-04-18 PROCEDURE — 76937 US GUIDE VASCULAR ACCESS: CPT

## 2024-04-18 PROCEDURE — 36410 VNPNXR 3YR/> PHY/QHP DX/THER: CPT

## 2024-04-18 PROCEDURE — 6370000000 HC RX 637 (ALT 250 FOR IP)

## 2024-04-18 PROCEDURE — 2060000000 HC ICU INTERMEDIATE R&B

## 2024-04-18 RX ORDER — ENOXAPARIN SODIUM 100 MG/ML
30 INJECTION SUBCUTANEOUS EVERY 24 HOURS
Status: DISCONTINUED | OUTPATIENT
Start: 2024-04-19 | End: 2024-04-19 | Stop reason: HOSPADM

## 2024-04-18 RX ADMIN — OXYCODONE 5 MG: 5 TABLET ORAL at 00:11

## 2024-04-18 RX ADMIN — OXYCODONE 5 MG: 5 TABLET ORAL at 12:32

## 2024-04-18 RX ADMIN — FAMOTIDINE 20 MG: 20 TABLET, FILM COATED ORAL at 08:23

## 2024-04-18 RX ADMIN — NALOXEGOL OXALATE 25 MG: 25 TABLET, FILM COATED ORAL at 08:24

## 2024-04-18 RX ADMIN — ONDANSETRON 4 MG: 2 INJECTION INTRAMUSCULAR; INTRAVENOUS at 21:10

## 2024-04-18 RX ADMIN — SENNOSIDES 17.2 MG: 8.6 TABLET ORAL at 21:10

## 2024-04-18 RX ADMIN — CEFTRIAXONE SODIUM 2000 MG: 2 INJECTION, POWDER, FOR SOLUTION INTRAMUSCULAR; INTRAVENOUS at 16:36

## 2024-04-18 RX ADMIN — CEFTRIAXONE SODIUM 2000 MG: 2 INJECTION, POWDER, FOR SOLUTION INTRAMUSCULAR; INTRAVENOUS at 05:05

## 2024-04-18 RX ADMIN — AMLODIPINE BESYLATE 5 MG: 5 TABLET ORAL at 08:23

## 2024-04-18 RX ADMIN — DULOXETINE HYDROCHLORIDE 20 MG: 20 CAPSULE, DELAYED RELEASE ORAL at 21:10

## 2024-04-18 RX ADMIN — BACLOFEN 10 MG: 10 TABLET ORAL at 13:02

## 2024-04-18 RX ADMIN — BACLOFEN 10 MG: 10 TABLET ORAL at 08:23

## 2024-04-18 RX ADMIN — ONDANSETRON 4 MG: 2 INJECTION INTRAMUSCULAR; INTRAVENOUS at 05:24

## 2024-04-18 RX ADMIN — ROPINIROLE HYDROCHLORIDE 1 MG: 1 TABLET, FILM COATED ORAL at 21:10

## 2024-04-18 RX ADMIN — SODIUM CHLORIDE, PRESERVATIVE FREE 10 ML: 5 INJECTION INTRAVENOUS at 21:37

## 2024-04-18 RX ADMIN — BACLOFEN 10 MG: 10 TABLET ORAL at 21:10

## 2024-04-18 RX ADMIN — OXYCODONE 5 MG: 5 TABLET ORAL at 08:23

## 2024-04-18 RX ADMIN — HYDROMORPHONE HYDROCHLORIDE 1 MG: 1 INJECTION, SOLUTION INTRAMUSCULAR; INTRAVENOUS; SUBCUTANEOUS at 21:03

## 2024-04-18 ASSESSMENT — PAIN SCALES - GENERAL
PAINLEVEL_OUTOF10: 6
PAINLEVEL_OUTOF10: 0
PAINLEVEL_OUTOF10: 6
PAINLEVEL_OUTOF10: 3
PAINLEVEL_OUTOF10: 0
PAINLEVEL_OUTOF10: 10
PAINLEVEL_OUTOF10: 5

## 2024-04-18 ASSESSMENT — PAIN DESCRIPTION - ORIENTATION
ORIENTATION: LOWER
ORIENTATION: MID

## 2024-04-18 ASSESSMENT — PAIN SCALES - WONG BAKER
WONGBAKER_NUMERICALRESPONSE: HURTS LITTLE MORE
WONGBAKER_NUMERICALRESPONSE: NO HURT

## 2024-04-18 ASSESSMENT — PAIN DESCRIPTION - LOCATION
LOCATION: BACK

## 2024-04-18 ASSESSMENT — PAIN DESCRIPTION - FREQUENCY: FREQUENCY: CONTINUOUS

## 2024-04-18 ASSESSMENT — PAIN DESCRIPTION - ONSET: ONSET: ON-GOING

## 2024-04-18 ASSESSMENT — PAIN DESCRIPTION - DESCRIPTORS
DESCRIPTORS: ACHING
DESCRIPTORS: ACHING;DISCOMFORT

## 2024-04-18 ASSESSMENT — PAIN DESCRIPTION - PAIN TYPE: TYPE: SURGICAL PAIN

## 2024-04-18 NOTE — PROGRESS NOTES
[facility]  PHYSICAL THERAPY MISSED TREATMENT NOTE  [facility] CVICU 4B    Date: 2024  Patient Name: Alpa Marte        MRN: [id number]   : 1955  (68 y.o.)  Gender: female   Referring Practitioner: Santino Stewart PA-C  Diagnosis: Adjacent segment disease of lumbar spine with history of fusion procedure         REASON FOR MISSED TREATMENT:  Pt was sleeping on arrival and I wasn't able to wake her with a sternal rub or calling her name did notify nursing and nursing was able to wake her. Pt extremely confused and agitated upon waking up and I wasn't reorient her to reason w/ her and unable to get her to participate in therapy, she did ask for a blanket. Will check back later or next available date .

## 2024-04-18 NOTE — CARE COORDINATION
4/18/24, 12:52 PM EDT    DISCHARGE ON GOING EVALUATION    [facility] day: 23  Location: -08/008-A Reason for admit: Adjacent segment disease of lumbar spine with history of fusion procedure [M51.36, Z98.1]  S/P lumbar fusion [Z98.1]  Meningitis [G03.9]     Procedures:   3/26 OR with Dr. Caldera: Reexploration and Revision of Lumbar Instrumented Fusion for Extension of the Decompression and Instrumentation to L3-Pelvis    3/29 lumbar drain removed  4/1 Lumbar CSF drain placed  4/3 CSF Lumbar drain replaced in IR  4/8 Lumbar drain removed  4/11 OR with Dr. Caldera: Lumbar wound re-exploration and revision for dural repair of CSF leak.    Imaging since last note: None    Barriers to Discharge: Hospitalist, ID, Neurosurgery following. POD #22 and POD #6. CSF culture: positive E.Coli. PT/OT/SLP. Rocephin iv q12hr. Pain control. Awaiting SNF acceptance.     PCP: Connie Augustin PA  Readmission Risk Score: 8.1%    Patient Goals/Plan/Treatment Preferences: From home, insurance denied [facility] (P2P upheld denial). SW following for SNF placement.     Verified with Dr. Melendez that pt will need to go to SNF on q12hr Rocephin. Requested order for PICC line.

## 2024-04-18 NOTE — PROGRESS NOTES
PowerGlide Insertion Procedure Note    Alpa Marte   Admitted- 3/26/2024  7:58 AM  Admission diagnosis- Adjacent segment disease of lumbar spine with history of fusion procedure [M51.36, Z98.1]  S/P lumbar fusion [Z98.1]  Meningitis [G03.9]      Attending Physician- Tevin Goins MD    Indication for Insertion: Antibiotic Therapy    Catheter Insertion Date- 4/18/2024   Catheter Brand-PowerGlide Pro Midline   Lot Number- [id number]  Gauge-20g  Lumen-single    Insertion Site- ZAN Brachial  Max Catheter Size- 18g   Catheter Length- 10 cm  Internal Length- 10 cm     Midline Tip Terminates in the Axillary- Yes  Upper Arm Circumference- 20cm  Easy insertion- Yes  Able to Aspirate blood- Yes  Easy Flush- Yes    Midline insertion successful- Yes  Ultrasound- yes    Okay To Use Midline- Yes    This line is not a central line, please do not use this for central solutions.   Line can be removed by the primary nurse.   Line can be used to collect labs, but is not guaranteed to draw blood the entire duration of dwell.   Cathflo cannot be used on this device.   Patient can discharge home/to SNF with this device if ordered.   PowerGlide device can dwell for up to 29 days.     Electronically signed by Tania Moreno RN, RN on 4/18/2024 at 2:55 PM

## 2024-04-18 NOTE — PROGRESS NOTES
[facility]   PROGRESS NOTE      Patient: Alpa Marte  Room #: 4B-08/008-A            YOB: 1955  Age: 68 y.o.  Gender: female            Admit Date & Time: 3/26/2024  7:58 AM    Assessment:    The patient was encountered in her room after nursing staff shared the patient had a dubious mental status. The patient was in her bed and maintained being a ball of motion at all times.     Interventions:  The patient began our encounter by sharing her concerns related to being hyperactive with restless leg syndrome and feeling a brain fog that made concentration more difficult. The patient shared feeling her mind not work the way she wished it to do. Through our conversation the patient opened up related to her condition and we explored spiritual practices to encourage a calmer mind. The primary lesson was related to using meditation to live in the moment. The patient was also encouraged that when her mind raced ahead that she could stop at the point to allow others to catch up.    The advice to let others catch up in conversation was because the patient shared sensations of alienation caused by others treating her differently. The patient had concerns related to others in relationship.      Outcomes:  In general the patient showed growth and coping with her conditions but not a willingness to acknowledge the progress that she has made.     Plan:  1.Spiritual care will continue to follow the patient according to [facility] spiritual care SOP.       Electronically signed by Chaplain David, on 4/18/2024 at 5:11 PM.  Spiritual Care Department  [facility]  [phone]     04/18/24 1704   Encounter Summary   Encounter Overview/Reason  Follow-up   Service Provided For: Patient   Referral/Consult From: Nurse   Support System Therapist;Family members;Cheondoism/celestine community   Last Encounter  04/18/24   Complexity of Encounter High

## 2024-04-18 NOTE — PROGRESS NOTES
Progress note: Infectious diseases    Patient - Alpa Marte,  Age - 68 y.o.    - 1955      Room Number - 4B-08/008-A   MRN -  [id number]   [facility] # - [id number]  Date of Admission -  3/26/2024  7:58 AM    SUBJECTIVE:   She has no new complaints. Has chronic pain  OBJECTIVE   VITALS    height is 1.651 m (5' 5\") and weight is 42.2 kg (93 lb). Her oral temperature is 97.7 °F (36.5 °C). Her blood pressure is 148/72 (abnormal) and her pulse is 100. Her respiration is 14 and oxygen saturation is 100%.       Wt Readings from Last 3 Encounters:   24 42.2 kg (93 lb)   24 48.6 kg (107 lb 2.3 oz)   01/15/24 48.6 kg (107 lb 3.2 oz)       I/O (24 Hours)    Intake/Output Summary (Last 24 hours) at 2024 0820  Last data filed at 2024 1830  Gross per 24 hour   Intake 170 ml   Output 275 ml   Net -105 ml         General Appearance awake  HEENT - normocephalic, atraumatic,   Lungs -  Bilateral   air entry, no rhonchi, no wheeze  Cardiovascular - Heart sounds are normal.     Abdomen - soft, not distended, nontender.     Neurologic -confused  Skin - No bruising or bleeding  Extremities - No edema, no cyanosis, clubbing   Dressed lumbar wound.(Post surgery)         MEDICATIONS:      docusate sodium  100 mg Oral Daily    baclofen  10 mg Oral TID    sodium chloride flush  5-40 mL IntraVENous 2 times per day    naloxegol  25 mg Oral QAM AC    polyethylene glycol  17 g Oral BID    cefTRIAXone (ROCEPHIN) IV  2,000 mg IntraVENous Q12H    DULoxetine  20 mg Oral Nightly    rOPINIRole  1 mg Oral Nightly    lidocaine  3 patch TransDERmal Daily    senna  2 tablet Oral Nightly    famotidine  20 mg Oral Daily    amLODIPine  5 mg Oral Daily    [Held by provider] gabapentin  600 mg Oral QAM    And    [Held by provider] gabapentin  900 mg Oral Nightly    sodium chloride flush  5-40 mL IntraVENous 2 times per day      sodium chloride    ----- Message from Darren Ivory MD sent at 3/6/2023  3:28 PM CST -----  Please call Gopal.  Glucose elevated but not in the diabetic range. This elevation does indicate an increased risk of developing diabetes in the future. This should be addressed with regular exercise, Mediterranean diet, and weight loss.  Cholesterol continues to be borderline high but not in the range that requires medications.  Continue with Mediterranean diet and repeat lipid panel in 1 year.        sodium chloride 0 mL/hr at 04/09/24 1721     sodium chloride flush, sodium chloride, potassium chloride **OR** potassium alternative oral replacement **OR** potassium chloride, HYDROmorphone **OR** HYDROmorphone, oxyCODONE, acetaminophen, naloxone, [Held by provider] HYDROcodone 5 mg - acetaminophen **OR** [Held by provider] HYDROcodone 5 mg - acetaminophen, hydrALAZINE, ondansetron, labetalol, sodium chloride flush, sodium chloride         Problem list of patient:     Patient Active Problem List   Diagnosis Code    Status post lumbar spinal fusion Z98.1    Moderate malnutrition (HCC) E44.0    Transient alteration of awareness R40.4    Intracranial bleeding (HCC) I62.9    Cerebellar hemorrhage (HCC) I61.4    Severe malnutrition (HCC) E43    Meningitis G03.9    Post-op pain G89.18         ASSESSMENT/PLAN   E. coli bacteremia with meningitis: Continue Rocephin every 12 hours.  Recent back surgery complicated with a dural leak:  she had revision with dura repair    Severe malnutrition  Continue current treatment.  She will need ECF to continue her antibiotics    Jag Melendez MD, 4/18/2024 8:20 AM

## 2024-04-18 NOTE — PROGRESS NOTES
\"AST\", \"PROT\", \"BILITOT\", \"BILIDIR\", \"LABALBU\" in the last 72 hours.  Amylase and Lipase:No results for input(s): \"LACTA\", \"AMYLASE\" in the last 72 hours.  Lactic Acid: No results for input(s): \"LACTA\" in the last 72 hours.  Troponin: No results for input(s): \"CKTOTAL\", \"CKMB\", \"TROPONINT\" in the last 72 hours.  BNP: No results for input(s): \"BNP\" in the last 72 hours.  Lipids: No results for input(s): \"CHOL\", \"TRIG\", \"HDL\", \"LDL\", \"LDLCALC\" in the last 72 hours.  ABGs: No results found for: \"PH\", \"PCO2\", \"PO2\", \"HCO3\", \"O2SAT\"        Radiology reports as per the Radiologist  Radiology: XR ABDOMEN (KUB) (SINGLE AP VIEW)    Result Date: 4/12/2024  PROCEDURE: XR ABDOMEN (KUB) (SINGLE AP VIEW) CLINICAL INFORMATION: Severe constipation TECHNIQUE: AP supine abdominal radiograph COMPARISON: Abdomen 4/8/2024 FINDINGS: Bowel gas pattern is nonobstructive. There are metallic clips in the pelvis. Degenerative and surgical changes in the lumbar spine and pelvis are completely visualized.     Nonobstructive bowel gas pattern. Final report electronically signed by Dr. Akash Carreno on 4/12/2024 2:43 PM    XR ABDOMEN FOR NG/OG/NE TUBE PLACEMENT    Result Date: 4/9/2024  ADDENDUM #1 This report was discussed with Lanie Ochoa RN on Apr 09, 2024 05:08:00 EDT. This document has been electronically signed by: Radha Arnold on 04/09/2024 05:10 AMORIGINAL REPORT  1 view abdomen Comparison: None Findings: The visualized lung bases are clear. Enteric tube with distal tip overlying the expected location of the stomach lumen. The gastric side port is at the level of the GE junction, recommend advancement by 8 cm. No pneumoperitoneum or pneumatosis. No abnormal calcifications. No acute fractures. Partially visualized posterior fixation of the lumbar spine.     1. Enteric tube with distal tip overlying the expected location of the stomach lumen. The gastric side port is at the level of the GE junction, recommend advancement by 8 cm. This  stenosis. 6. The right and left superior cerebellar arteries are not clearly seen. 7. Otherwise negative CTA of the head and neck. **This report has been created using voice recognition software. It may contain minor errors which are inherent in voice recognition technology.** Final report electronically signed by DR GASPER WOODSON on 3/31/2024 1:48 PM    MRI BRAIN WO CONTRAST    Result Date: 3/31/2024  PROCEDURE: MRI BRAIN WO CONTRAST CLINICAL INFORMATION possible parenchymal hemorrhage on L occipital lobe noted on CT head. COMPARISON: CT scan of the brain dated 3/30/2024.. TECHNIQUE: Multiplanar and multiple spin echo MRI images were obtained of the brain without contrast FINDINGS: There is a 12 x 7 mm area of abnormal signal intensity in the left cerebellar hemisphere with associated susceptibility artifact consistent with an acute hematoma. There is abnormal signal intensity along the tentorium cerebelli bilaterally consistent with hemorrhage.  There is a small susceptibility artifact in the subdural space over the right frontal lobe. The diffusion-weighted images are normal.  The brain volume is slightly reduced. There is a mild amount of signal hyperintensity on the FLAIR and T2-weighted sequences in the white matter of the brain. This is consistent with mild severity chronic small vessel ischemic changes. There  is no hydrocephalus, midline shift or mass effect. There is mineralization in  basal ganglia and cerebral peduncles bilaterally. The major intracranial vascular flow voids are present. The midline craniocervical junction structures are normal.  The pituitary gland and brainstem are normal. There are mild inflammatory changes in ethmoid air cells and mastoid air cells bilaterally and in the left maxillary sinus.      1. 12 x 7 mm area of acute hemorrhage in the left cerebellar hemisphere. 2. There is hemorrhage along the tentorium cerebelli bilaterally. 3. There is a small area of susceptibility artifact  normoactive bowel sounds   Neurological - non focal, no neurological deficits noted, total drainage tube in place  Integumentary - Skin color, texture, turgor normal. No Rashes or lesions  Musculoskeletal -Full ROM, No clubbing or cyanosis  Extremities: No peripheral edema    DVT prophylaxis: [] Lovenox                                 [] SCDs                                 [] SQ Heparin                                 [] Encourage ambulation           [] Already on Anticoagulation        Further chart review to ensure there is no contraindication to initiating heparin or Lovenox, I will likely initiate Lovenox 4/19/2024 for DVT prophylaxis               Electronically signed by Tevin Goins MD on 4/18/2024 at 5:19 PM    [facility]

## 2024-04-18 NOTE — PROGRESS NOTES
Comprehensive Nutrition Assessment    Type and Reason for Visit:  Reassess (po/supplement)    Nutrition Recommendations/Plan:   Recommend consider MVI and an appetite stimulant due to continued poor oral intake.  Continue ONS: Ensure Plus TID and Magic Cup BID.  Patient states she likes.  Continue ileus prevention:activia yogurt and hot beverage with meals.  Patient states she has been eating the yogurt.    Patient refused NGT placement for supplemental tubefeedings.      Malnutrition Assessment:  Malnutrition Status:  Severe malnutrition (04/02/24 1422)    Context:  Chronic Illness     Findings of the 6 clinical characteristics of malnutrition:  Energy Intake:  75% or less estimated energy requirements for 1 month or longer  Weight Loss:  7.5% over 3 months (8% loss in 3 months from office visit weight of 108# 6.4 oz on 1/4/24)     Body Fat Loss:  Severe body fat loss Triceps, Fat Overlying Ribs, Buccal region, Orbital   Muscle Mass Loss:  Severe muscle mass loss Temples (temporalis), Scapula (trapezius), Clavicles (pectoralis & deltoids), Thigh (quadriceps), Calf (gastrocnemius)  Fluid Accumulation:  No significant fluid accumulation     Strength:  Not Performed    Nutrition Assessment:     Pt. with minimal improvement from a nutritional standpoint AEB continued variable meal intake 1-75%  but does like ONS and yogurt. Remains at risk for further nutritional compromise r/t severe malnutrition, constipation, acute L cerebellar hemorrhage on 3/31, s/p lumbar fusion L3-sacrum- wound vac removed 3/29 and lumbar drain placed by IR 4/1 d/t CSF leak-removed 4/8/24, lumbar wound re-exploration and revision for dural repair of CSF leak 4/11, hypertensive urgency, E. coli bacteremia with meningitis, and underlying medical condition (PMHx: s/p lumbar fusion 1998 and 2003, current smoker).     Nutrition Related Findings:    Pt. Report/Treatments/Miscellaneous: Patient seen, telesitter present. Patient with lunch tray at  (g/day): 77+ grams (1.7+ grams/kg)  Method Used for Fluid Requirements: 1 ml/kcal  Fluid (ml/day): 7188-9032 ml (1ml/kcal/day) or per MD    Nutrition Diagnosis:   Severe malnutrition, In context of chronic illness related to inadequate protein-energy intake, pain as evidenced by Criteria as identified in malnutrition assessment    Nutrition Interventions:   Food and/or Nutrient Delivery: Continue Current Diet, Continue Oral Nutrition Supplement  Nutrition Education/Counseling: Education initiated  Coordination of Nutrition Care: Continue to monitor while inpatient, Interdisciplinary Rounds, Speech Therapy       Goals:  Previous Goal Met: Progressing toward Goal(s)  Goals: Meet at least 75% of estimated needs, by next RD assessment       Nutrition Monitoring and Evaluation:      Food/Nutrient Intake Outcomes: Food and Nutrient Intake, Supplement Intake  Physical Signs/Symptoms Outcomes: Biochemical Data, Chewing or Swallowing, GI Status, Fluid Status or Edema, Nutrition Focused Physical Findings, Skin, Weight    Discharge Planning:    Too soon to determine     Allison C Schwab, RD, LD  Contact: [phone]

## 2024-04-18 NOTE — PROGRESS NOTES
Pt was in bed and alone at the time of the visit. She was dealing with status post lumbar spinal fusion. She was encouraged   04/18/24 1022   Encounter Summary   Service Provided For: Patient   [facility]   Support System Family members   Last Encounter  04/18/24   Complexity of Encounter Low   Begin Time 0745   End Time  0751   Total Time Calculated 6 min   Spiritual/Emotional needs   Type Spiritual Support   Assessment/Intervention/Outcome   Assessment Hopeful   Intervention Empowerment   Outcome Encouraged      and blessed. It was appreciated.

## 2024-04-19 VITALS
HEART RATE: 96 BPM | RESPIRATION RATE: 18 BRPM | WEIGHT: 92.59 LBS | SYSTOLIC BLOOD PRESSURE: 121 MMHG | OXYGEN SATURATION: 100 % | BODY MASS INDEX: 15.43 KG/M2 | DIASTOLIC BLOOD PRESSURE: 59 MMHG | HEIGHT: 65 IN | TEMPERATURE: 98 F

## 2024-04-19 PROCEDURE — 51798 US URINE CAPACITY MEASURE: CPT

## 2024-04-19 PROCEDURE — 6370000000 HC RX 637 (ALT 250 FOR IP)

## 2024-04-19 PROCEDURE — 6370000000 HC RX 637 (ALT 250 FOR IP): Performed by: PHYSICIAN ASSISTANT

## 2024-04-19 PROCEDURE — 51701 INSERT BLADDER CATHETER: CPT

## 2024-04-19 PROCEDURE — 97116 GAIT TRAINING THERAPY: CPT

## 2024-04-19 PROCEDURE — 6360000002 HC RX W HCPCS: Performed by: INTERNAL MEDICINE

## 2024-04-19 PROCEDURE — 6370000000 HC RX 637 (ALT 250 FOR IP): Performed by: INTERNAL MEDICINE

## 2024-04-19 PROCEDURE — 6360000002 HC RX W HCPCS: Performed by: PHYSICIAN ASSISTANT

## 2024-04-19 PROCEDURE — 2580000003 HC RX 258: Performed by: PHYSICIAN ASSISTANT

## 2024-04-19 PROCEDURE — 97530 THERAPEUTIC ACTIVITIES: CPT

## 2024-04-19 RX ORDER — BACLOFEN 10 MG/1
10 TABLET ORAL 3 TIMES DAILY
Qty: 20 TABLET | Refills: 0 | Status: SHIPPED | OUTPATIENT
Start: 2024-04-19

## 2024-04-19 RX ORDER — CEFTRIAXONE 500 MG/1
1000 INJECTION, POWDER, FOR SOLUTION INTRAMUSCULAR; INTRAVENOUS EVERY 12 HOURS
Qty: 16 EACH | Refills: 0
Start: 2024-04-19 | End: 2024-04-23

## 2024-04-19 RX ORDER — AMLODIPINE BESYLATE 5 MG/1
5 TABLET ORAL DAILY
Qty: 30 TABLET | Refills: 3 | Status: SHIPPED | OUTPATIENT
Start: 2024-04-20

## 2024-04-19 RX ADMIN — BACLOFEN 10 MG: 10 TABLET ORAL at 14:28

## 2024-04-19 RX ADMIN — AMLODIPINE BESYLATE 5 MG: 5 TABLET ORAL at 08:53

## 2024-04-19 RX ADMIN — SODIUM CHLORIDE, PRESERVATIVE FREE 10 ML: 5 INJECTION INTRAVENOUS at 08:45

## 2024-04-19 RX ADMIN — NALOXEGOL OXALATE 25 MG: 25 TABLET, FILM COATED ORAL at 08:53

## 2024-04-19 RX ADMIN — FAMOTIDINE 20 MG: 20 TABLET, FILM COATED ORAL at 08:53

## 2024-04-19 RX ADMIN — CEFTRIAXONE SODIUM 2000 MG: 2 INJECTION, POWDER, FOR SOLUTION INTRAMUSCULAR; INTRAVENOUS at 16:45

## 2024-04-19 RX ADMIN — OXYCODONE 5 MG: 5 TABLET ORAL at 08:53

## 2024-04-19 RX ADMIN — OXYCODONE 5 MG: 5 TABLET ORAL at 13:15

## 2024-04-19 RX ADMIN — ENOXAPARIN SODIUM 30 MG: 100 INJECTION SUBCUTANEOUS at 08:53

## 2024-04-19 RX ADMIN — DOCUSATE SODIUM 100 MG: 100 CAPSULE, LIQUID FILLED ORAL at 08:53

## 2024-04-19 RX ADMIN — BACLOFEN 10 MG: 10 TABLET ORAL at 08:53

## 2024-04-19 RX ADMIN — CEFTRIAXONE SODIUM 2000 MG: 2 INJECTION, POWDER, FOR SOLUTION INTRAMUSCULAR; INTRAVENOUS at 06:19

## 2024-04-19 RX ADMIN — HYDROMORPHONE HYDROCHLORIDE 1 MG: 1 INJECTION, SOLUTION INTRAMUSCULAR; INTRAVENOUS; SUBCUTANEOUS at 19:30

## 2024-04-19 RX ADMIN — ONDANSETRON 4 MG: 2 INJECTION INTRAMUSCULAR; INTRAVENOUS at 08:45

## 2024-04-19 ASSESSMENT — PAIN SCALES - GENERAL
PAINLEVEL_OUTOF10: 0
PAINLEVEL_OUTOF10: 8
PAINLEVEL_OUTOF10: 7

## 2024-04-19 ASSESSMENT — PAIN DESCRIPTION - DESCRIPTORS
DESCRIPTORS: ACHING
DESCRIPTORS: ACHING;DISCOMFORT

## 2024-04-19 ASSESSMENT — PAIN DESCRIPTION - ONSET: ONSET: ON-GOING

## 2024-04-19 ASSESSMENT — PAIN DESCRIPTION - LOCATION
LOCATION: BACK
LOCATION: BACK

## 2024-04-19 ASSESSMENT — PAIN DESCRIPTION - PAIN TYPE: TYPE: SURGICAL PAIN

## 2024-04-19 ASSESSMENT — PAIN DESCRIPTION - FREQUENCY: FREQUENCY: INTERMITTENT

## 2024-04-19 ASSESSMENT — PAIN - FUNCTIONAL ASSESSMENT: PAIN_FUNCTIONAL_ASSESSMENT: PREVENTS OR INTERFERES SOME ACTIVE ACTIVITIES AND ADLS

## 2024-04-19 ASSESSMENT — PAIN DESCRIPTION - ORIENTATION: ORIENTATION: MID;LOWER

## 2024-04-19 NOTE — CARE COORDINATION
4/19/24, 8:30 AM EDT    DISCHARGE PLANNING EVALUATION     [facility], Precert started today for Isela Bryan.    LANCE called pts brother Ja and left detailed message on voicemail.    Pc received from Estrella with [facility], Precert has been approved. Lance spoke with nurse, she contacted physician and okay to discharge. LANCE called [facility], they can transport pt today around 7 pm. LANCE notified Estrella at [facility]. Lance spoke with pts brother Ja for update.   Nurse states pt will require Rocephin through 2-23 BID.  Lance updated Estrella at [facility] and nurse to fax order to [facility]

## 2024-04-19 NOTE — DISCHARGE INSTR - COC
Continuity of Care Form    Patient Name: Alpa Marte   :  1955  MRN:  [id number]    Admit date:  3/26/2024  Discharge date:  2024    Code Status Order: Full Code   Advance Directives:   Advance Care Flowsheet Documentation       Date/Time Healthcare Directive Type of Healthcare Directive Copy in Chart Healthcare Agent Appointed Healthcare Agent's Name Healthcare Agent's Phone Number    24 0935 No, patient does not have an advance directive for healthcare treatment -- -- -- -- --            Admitting Physician:  Brannon Castaneda MD  PCP: Connie Augustin PA    Discharging Nurse: [facility] Unit/Room#: 4B-08/008-A  Discharging Unit Phone Number: [phone]    Emergency Contact:   Extended Emergency Contact Information  Primary Emergency Contact: Ja Montoya  Address: [address] [place] [address] [facility]  Home Phone: [phone]  Mobile Phone: [phone]  Relation: Brother/Sister    Past Surgical History:  Past Surgical History:   Procedure Laterality Date    APPENDECTOMY      [facility]    CHOLECYSTECTOMY      [facility]    LUMBAR FUSION      [facility]    LUMBAR FUSION      [facility]    LUMBAR FUSION N/A 3/26/2024    Reexploration and Revision of Lumbar Instrumented Fusion for Extension of the Decompression and Instrumentation to L3-Pelvis performed by Charlie Caldera MD at [facility] OR    SPINE SURGERY N/A 2024    Lumbar wound re-exploration and revision for dural repair of CSF leak performed by Charlie Caldera MD at [facility] OR    WRIST SURGERY Left     [facility]       Immunization History:     There is no immunization history on file for this patient.    Active Problems:  Patient Active Problem List   Diagnosis Code    Status post lumbar spinal fusion Z98.1    Moderate malnutrition (HCC) E44.0    Transient alteration of awareness R40.4    Intracranial bleeding (HCC) I62.9       Dressing/Treatment Gauze dressing/dressing sponge;Tape/Soft cloth adhesive tape 04/17/24 1920   Closure Sutures 04/17/24 1920   Incision Assessment Other (Comment) 04/17/24 1920   Drainage Amount Moderate (25-50%) 04/17/24 1920   Drainage Description Clear;Serous 04/17/24 1920   Odor None 04/17/24 1920   Mary Jane-incision Assessment Intact 04/19/24 0840   Number of days: 8        Elimination:  Continence:   Bowel: Yes  Bladder: Yes  Urinary Catheter: None   Colostomy/Ileostomy/Ileal Conduit: No       Date of Last BM: 4/19/2024    Intake/Output Summary (Last 24 hours) at 4/19/2024 1144  Last data filed at 4/19/2024 1032  Gross per 24 hour   Intake 489.53 ml   Output 200 ml   Net 289.53 ml     I/O last 3 completed shifts:  In: 459.5 [P.O.:360; IV Piggyback:99.5]  Out: -     Safety Concerns:     At Risk for Falls    Impairments/Disabilities:      None    Nutrition Therapy:  Current Nutrition Therapy:   - Oral Diet:  General with activia, a hot drink, and high protein standard supplement with B, L, D and a frozen supplement with L and D    Routes of Feeding: Oral  Liquids: Thin Liquids  Daily Fluid Restriction: no  Last Modified Barium Swallow with Video (Video Swallowing Test): not done    Treatments at the Time of Hospital Discharge:   Respiratory Treatments: None  Oxygen Therapy:  is not on home oxygen therapy.  Ventilator:    - No ventilator support    Rehab Therapies: Physical Therapy, Occupational Therapy  Weight Bearing Status/Restrictions: No weight bearing restrictions  Other Medical Equipment (for information only, NOT a DME order):  walker  Other Treatments: none    Patient's personal belongings (please select all that are sent with patient):  Glasses, Dentures upper    RN SIGNATURE:  Electronically signed by PIYUSH ASHFORD on 4/19/24 at 11:57 AM EDT    CASE MANAGEMENT/SOCIAL WORK SECTION    Inpatient Status Date: 3-    Readmission Risk Assessment Score:  Readmission Risk              Risk of Unplanned

## 2024-04-19 NOTE — PROGRESS NOTES
Patient Care turned over to EMS for transport to [facility]. Report given to ATIF Mcghee.

## 2024-04-19 NOTE — PROGRESS NOTES
Progress note: Infectious diseases    Patient - Alpa Marte,  Age - 68 y.o.    - 1955      Room Number - 4B-08/008-A   MRN -  [id number]   [facility] # - [id number]  Date of Admission -  3/26/2024  7:58 AM    SUBJECTIVE:   No acute complaints today.  She has chronic pain.  She denies any headaches at this time.  OBJECTIVE   VITALS    height is 1.651 m (5' 5\") and weight is 42 kg (92 lb 9.5 oz). Her oral temperature is 97.7 °F (36.5 °C). Her blood pressure is 134/74 and her pulse is 110 (abnormal). Her respiration is 16 and oxygen saturation is 97%.       Wt Readings from Last 3 Encounters:   24 42 kg (92 lb 9.5 oz)   24 48.6 kg (107 lb 2.3 oz)   01/15/24 48.6 kg (107 lb 3.2 oz)       I/O (24 Hours)    Intake/Output Summary (Last 24 hours) at 2024 0926  Last data filed at 2024 0916  Gross per 24 hour   Intake 339.53 ml   Output 200 ml   Net 139.53 ml       General Appearance  Awake, alert, oriented,  not  In acute distress.  Chronically ill-appearing  HEENT - normocephalic, atraumatic, pink conjunctiva,  anicteric sclera  Neck - Supple, no mass  Lungs -  Bilateral good air entry, no rhonchi, no wheeze  Cardiovascular - Heart sounds are normal.  Regular rate and rhythm without murmur, gallop or rub.  Abdomen - soft, not distended, nontender,   Neurologic -alert, awake, oriented.  Skin - No bruising or bleeding.  She has a dressed lumbar wound  Extremities - No edema, no cyanosis, clubbing     MEDICATIONS:      enoxaparin  30 mg SubCUTAneous Q24H    docusate sodium  100 mg Oral Daily    baclofen  10 mg Oral TID    sodium chloride flush  5-40 mL IntraVENous 2 times per day    naloxegol  25 mg Oral QAM AC    polyethylene glycol  17 g Oral BID    cefTRIAXone (ROCEPHIN) IV  2,000 mg IntraVENous Q12H    DULoxetine  20 mg Oral Nightly    rOPINIRole  1 mg Oral Nightly    lidocaine  3 patch TransDERmal Daily     senna  2 tablet Oral Nightly    famotidine  20 mg Oral Daily    amLODIPine  5 mg Oral Daily    [Held by provider] gabapentin  600 mg Oral QAM    And    [Held by provider] gabapentin  900 mg Oral Nightly    sodium chloride flush  5-40 mL IntraVENous 2 times per day      sodium chloride      sodium chloride 0 mL/hr at 04/09/24 1721     sodium chloride flush, sodium chloride, potassium chloride **OR** potassium alternative oral replacement **OR** potassium chloride, HYDROmorphone **OR** HYDROmorphone, oxyCODONE, acetaminophen, naloxone, [Held by provider] HYDROcodone 5 mg - acetaminophen **OR** [Held by provider] HYDROcodone 5 mg - acetaminophen, hydrALAZINE, ondansetron, labetalol, sodium chloride flush, sodium chloride      LABS:     CBC: No results for input(s): \"WBC\", \"HGB\", \"PLT\" in the last 72 hours.  BMP:  No results for input(s): \"NA\", \"K\", \"CL\", \"CO2\", \"BUN\", \"CREATININE\", \"GLUCOSE\" in the last 72 hours.  Calcium:No results for input(s): \"CALCIUM\" in the last 72 hours.  Ionized Calcium:No results for input(s): \"IONCA\" in the last 72 hours.  Magnesium:No results for input(s): \"MG\" in the last 72 hours.  Phosphorus:No results for input(s): \"PHOS\" in the last 72 hours.  BNP:No results for input(s): \"BNP\" in the last 72 hours.  Glucose:No results for input(s): \"POCGLU\" in the last 72 hours.  HgbA1C: No results for input(s): \"LABA1C\" in the last 72 hours.  INR: No results for input(s): \"INR\" in the last 72 hours.  Hepatic: No results for input(s): \"ALKPHOS\", \"ALT\", \"AST\", \"PROT\", \"BILITOT\", \"BILIDIR\", \"LABALBU\" in the last 72 hours.  Amylase and Lipase:No results for input(s): \"LACTA\", \"AMYLASE\" in the last 72 hours.  Lactic Acid: No results for input(s): \"LACTA\" in the last 72 hours.  Troponin: No results for input(s): \"CKTOTAL\", \"CKMB\", \"TROPONINI\" in the last 72 hours.  BNP: No results for input(s): \"BNP\" in the last 72 hours.    CULTURES:   UA: No results for input(s): \"SPECGRAV\", \"PHUR\", \"COLORU\", \"CLARITYU\",

## 2024-04-19 NOTE — PROGRESS NOTES
[facility]  INPATIENT PHYSICAL THERAPY  DAILY NOTE  [facility] CVICU 4B - 4B-08/008-A      Time In: 0810  Time Out: 0833  Timed Code Treatment Minutes: 23 Minutes  Minutes: 23          Date: 2024  Patient Name: Alpa Marte,  Gender:  female        MRN: [id number]  : 1955  (68 y.o.)     Referring Practitioner: Santino Stewart PA-C  Diagnosis: Adjacent segment disease of lumbar spine with history of fusion procedure  Additional Pertinent Hx: Per HPI, \"Alpa Marte is an 68 y.o.  female, an every day smoker tobacco and 1/2 pack day history who admits to regular alcohol use and has a medical history significant for restless leg syndrome and urinary incontinence with a surgical history that includes prior lumbar fusions in  and again in  performed at [facility] resulting in pedicle screw and garima instrumentation at lumbar 4 spanning to S1.  She presents today unaccompanied and ambulating without assistance with complaints of chronic worsening low back pain radiating primarily to the left lower extremity all the way to the foot including chronic weakness and chronic partial left foot drop.  She does not take any medication other than over-the-counter medications and treats with ice and heat and rest.  In the past she has been treated by pain specialist who provided some injection therapies (greater than 20 years prior) that provided only transient relief. A CT scan imaged on 2024 that reflect pedicle screws from L4-S1 along with laminectomies and a grade 3 anterior listhesis of L5 on S1 unchanged. The flexion-extension x-ray also shows the grade 3 to grade 4 anterior listhesis of L5 on S1 with no evidence of additional change in flexion or extension. SI joint imaging reveals some narrowing along with sclerosis for SI joints bilaterally.\"  Pt is now s/p Reexploration and Revision of Lumbar Instrumented Fusion for Extension of the Decompression and

## 2024-04-23 NOTE — CARE COORDINATION
4/23/24, 8:15 AM EDT    DISCHARGE PLANNING EVALUATION     Pc received from pts brother Ja, states someone lost pts dentures while she was here and would like someone to look into this. Ja states the denture container came with pt to the nursing home however, no dentures were there.  SW called Patient Relations spoke Terri, she will call pts brother for follow up.

## 2024-05-03 ENCOUNTER — OFFICE VISIT (OUTPATIENT)
Dept: NEUROSURGERY | Age: 69
End: 2024-05-03

## 2024-05-03 VITALS
BODY MASS INDEX: 15.43 KG/M2 | SYSTOLIC BLOOD PRESSURE: 137 MMHG | HEIGHT: 65 IN | WEIGHT: 92.59 LBS | HEART RATE: 93 BPM | DIASTOLIC BLOOD PRESSURE: 77 MMHG

## 2024-05-03 DIAGNOSIS — M51.36 ADJACENT SEGMENT DISEASE OF LUMBAR SPINE WITH HISTORY OF FUSION PROCEDURE: Primary | ICD-10-CM

## 2024-05-03 DIAGNOSIS — Z98.1 STATUS POST LUMBAR SPINAL FUSION: ICD-10-CM

## 2024-05-03 DIAGNOSIS — Z98.1 ADJACENT SEGMENT DISEASE OF LUMBAR SPINE WITH HISTORY OF FUSION PROCEDURE: Primary | ICD-10-CM

## 2024-05-03 PROCEDURE — 99024 POSTOP FOLLOW-UP VISIT: CPT | Performed by: PHYSICIAN ASSISTANT

## 2024-05-03 NOTE — PROGRESS NOTES
[facility] [place] [facility]  [facility] NEUROSURGERY  [address]  [facility] [address]  Dept: [phone]  Dept Fax: [phone]  Loc: [phone]    Post Op Follow Visit  Visit Date: 5/3/2024      Alpa  is a 68 y.o. female who is returning to the office today for a post-op follow-up visit. She had surgery on 4/11/2024 with Dr. Caldera/neurosurgeon where she underwent reexploration revision of lumbar fusion with concurrent duraplasty with an initial procedure for reexploration and revision for extension of her lumbar fusion having been performed on 3/26/2024, also with Dr. Caldera/neurosurgeon.  She was most recently seen and evaluated while an inpatient at [facility] on 4/15/2024 with today's follow-up serving as her initial post discharge hospital follow-up from the procedure, principally for suture removal.  She arrives today unaccompanied and in a wheelchair with pain well to moderately controlled.  Her incision site is inspected with surgical sutures and staples removed and Steri-Strips applied to the incision site with encouragement to maintain the Steri-Strips for 3 to 5 days removing any residual Steri-Strips after 7 days.  A follow-up with our service is indicated in 4 weeks to ascertain continued healing of the incision and progress towards resolution of her prior symptom presentation.  She arrived with lumbar support brace intact and did relate to mechanical falls but is not complaining of any discomfort from those falls and right is relatively intact neurologically.     Patient was evaluated today and is doing well overall.  No new complaints were voiced.  Patient  lives in an assisted living facility  Wound: wound healing as expected  Follow-up Studies: No orders of the defined types were placed in this encounter.       Assessment/Plan:  Status Post lumbar fusion follow-up  Doing well overall  Encouraged gradual increase in physical and mental activity.  Fall

## 2024-05-13 LAB
FUNGUS SPEC CULT: NORMAL
FUNGUS SPEC FUNGUS STN: NORMAL

## 2024-06-03 ENCOUNTER — TELEPHONE (OUTPATIENT)
Dept: NEUROSURGERY | Age: 69
End: 2024-06-03

## 2024-06-03 NOTE — TELEPHONE ENCOUNTER
Left message for patient to call the office to reschedule appointment for June 7 with Santino Stewart as provider will not be in the office.  Asked patient to call the office back to help her reschedule.

## 2024-06-05 ENCOUNTER — TELEPHONE (OUTPATIENT)
Dept: NEUROSURGERY | Age: 69
End: 2024-06-05

## 2024-06-05 NOTE — TELEPHONE ENCOUNTER
Second message left for patient to call the office to reschedule appointment for June 7 with Santino Stewart as provider will not be in the office.  Asked patient to call the office back to help her reschedule.

## 2024-06-10 ENCOUNTER — APPOINTMENT (OUTPATIENT)
Dept: GENERAL RADIOLOGY | Age: 69
DRG: 871 | End: 2024-06-10
Attending: INTERNAL MEDICINE
Payer: MEDICARE

## 2024-06-10 ENCOUNTER — HOSPITAL ENCOUNTER (INPATIENT)
Age: 69
LOS: 1 days | DRG: 871 | End: 2024-06-11
Attending: INTERNAL MEDICINE | Admitting: INTERNAL MEDICINE
Payer: MEDICARE

## 2024-06-10 DIAGNOSIS — R41.89 UNRESPONSIVE: ICD-10-CM

## 2024-06-10 DIAGNOSIS — I46.9 CARDIOPULMONARY ARREST (HCC): ICD-10-CM

## 2024-06-10 DIAGNOSIS — I99.8 ISCHEMIA: Primary | ICD-10-CM

## 2024-06-10 DIAGNOSIS — J96.01 ACUTE RESPIRATORY FAILURE WITH HYPOXIA (HCC): ICD-10-CM

## 2024-06-10 PROBLEM — J96.00 ACUTE RESPIRATORY FAILURE (HCC): Status: ACTIVE | Noted: 2024-06-10

## 2024-06-10 LAB
ALBUMIN SERPL BCG-MCNC: 2.2 G/DL (ref 3.5–5.1)
ALP SERPL-CCNC: 61 U/L (ref 38–126)
ALT SERPL W/O P-5'-P-CCNC: 55 U/L (ref 11–66)
ANION GAP SERPL CALC-SCNC: 15 MEQ/L (ref 8–16)
ARTERIAL PATENCY WRIST A: ABNORMAL
AST SERPL-CCNC: 91 U/L (ref 5–40)
BASE EXCESS BLDA CALC-SCNC: -11.5 MMOL/L (ref -2.5–2.5)
BDY SITE: ABNORMAL
BILIRUB SERPL-MCNC: 0.7 MG/DL (ref 0.3–1.2)
BREATHS SETTING VENT: 12 BPM
BUN SERPL-MCNC: 77 MG/DL (ref 7–22)
CA-I BLD ISE-SCNC: 1.25 MMOL/L (ref 1.12–1.32)
CA-I BLD ISE-SCNC: 1.3 MMOL/L (ref 1.12–1.32)
CALCIUM SERPL-MCNC: 9 MG/DL (ref 8.5–10.5)
CHLORIDE BLD-SCNC: 118 MEQ/L (ref 98–109)
CHLORIDE SERPL-SCNC: 111 MEQ/L (ref 98–111)
CO2 SERPL-SCNC: 19 MEQ/L (ref 23–33)
COLLECTED BY:: ABNORMAL
CREAT SERPL-MCNC: 1.7 MG/DL (ref 0.4–1.2)
DEVICE: ABNORMAL
FIO2 ON VENT O2 ANALYZER: 100 %
GFR SERPL CREATININE-BSD FRML MDRD: 32 ML/MIN/1.73M2
GLUCOSE BLD-MCNC: 77 MG/DL (ref 70–108)
GLUCOSE SERPL-MCNC: 97 MG/DL (ref 70–108)
HCO3 BLDA-SCNC: 15 MMOL/L (ref 23–28)
LACTATE BLD-SCNC: 5.9 MMOL/L (ref 0.5–1.9)
MAGNESIUM SERPL-MCNC: 2.1 MG/DL (ref 1.6–2.4)
PCO2 BLDA: 34 MMHG (ref 35–45)
PEEP SETTING VENT: 10 MMHG
PH BLDA: 7.25 [PH] (ref 7.35–7.45)
PHOSPHATE SERPL-MCNC: 6.3 MG/DL (ref 2.4–4.7)
PIP: 14 CMH2O
PO2 BLDA: 524 MMHG (ref 71–104)
POC CREATININE WHOLE BLOOD: 2 MG/DL (ref 0.5–1.2)
POTASSIUM BLD-SCNC: 5.1 MEQ/L (ref 3.5–4.9)
POTASSIUM SERPL-SCNC: 3.6 MEQ/L (ref 3.5–5.2)
PROT SERPL-MCNC: 5.1 G/DL (ref 6.1–8)
SAO2 % BLDA: 100 %
SODIUM BLD-SCNC: 144 MEQ/L (ref 138–146)
SODIUM SERPL-SCNC: 145 MEQ/L (ref 135–145)
VENTILATION MODE VENT: AC

## 2024-06-10 PROCEDURE — 82803 BLOOD GASES ANY COMBINATION: CPT

## 2024-06-10 PROCEDURE — 85730 THROMBOPLASTIN TIME PARTIAL: CPT

## 2024-06-10 PROCEDURE — 84295 ASSAY OF SERUM SODIUM: CPT

## 2024-06-10 PROCEDURE — 87641 MR-STAPH DNA AMP PROBE: CPT

## 2024-06-10 PROCEDURE — 83735 ASSAY OF MAGNESIUM: CPT

## 2024-06-10 PROCEDURE — 84132 ASSAY OF SERUM POTASSIUM: CPT

## 2024-06-10 PROCEDURE — 82435 ASSAY OF BLOOD CHLORIDE: CPT

## 2024-06-10 PROCEDURE — 85384 FIBRINOGEN ACTIVITY: CPT

## 2024-06-10 PROCEDURE — 36415 COLL VENOUS BLD VENIPUNCTURE: CPT

## 2024-06-10 PROCEDURE — 2000000000 HC ICU R&B

## 2024-06-10 PROCEDURE — 2580000003 HC RX 258

## 2024-06-10 PROCEDURE — 87086 URINE CULTURE/COLONY COUNT: CPT

## 2024-06-10 PROCEDURE — 85390 FIBRINOLYSINS SCREEN I&R: CPT

## 2024-06-10 PROCEDURE — 6360000002 HC RX W HCPCS

## 2024-06-10 PROCEDURE — 85610 PROTHROMBIN TIME: CPT

## 2024-06-10 PROCEDURE — 37799 UNLISTED PX VASCULAR SURGERY: CPT

## 2024-06-10 PROCEDURE — 82565 ASSAY OF CREATININE: CPT

## 2024-06-10 PROCEDURE — 85025 COMPLETE CBC W/AUTO DIFF WBC: CPT

## 2024-06-10 PROCEDURE — 84100 ASSAY OF PHOSPHORUS: CPT

## 2024-06-10 PROCEDURE — 85520 HEPARIN ASSAY: CPT

## 2024-06-10 PROCEDURE — 82947 ASSAY GLUCOSE BLOOD QUANT: CPT

## 2024-06-10 PROCEDURE — 80053 COMPREHEN METABOLIC PANEL: CPT

## 2024-06-10 PROCEDURE — 2500000003 HC RX 250 WO HCPCS

## 2024-06-10 PROCEDURE — 71045 X-RAY EXAM CHEST 1 VIEW: CPT

## 2024-06-10 PROCEDURE — 94002 VENT MGMT INPAT INIT DAY: CPT

## 2024-06-10 PROCEDURE — 2700000000 HC OXYGEN THERAPY PER DAY

## 2024-06-10 PROCEDURE — 83605 ASSAY OF LACTIC ACID: CPT

## 2024-06-10 PROCEDURE — 82330 ASSAY OF CALCIUM: CPT

## 2024-06-10 PROCEDURE — 94761 N-INVAS EAR/PLS OXIMETRY MLT: CPT

## 2024-06-10 PROCEDURE — 85576 BLOOD PLATELET AGGREGATION: CPT

## 2024-06-10 PROCEDURE — 85347 COAGULATION TIME ACTIVATED: CPT

## 2024-06-10 RX ORDER — SODIUM CHLORIDE, SODIUM LACTATE, POTASSIUM CHLORIDE, CALCIUM CHLORIDE 600; 310; 30; 20 MG/100ML; MG/100ML; MG/100ML; MG/100ML
INJECTION, SOLUTION INTRAVENOUS CONTINUOUS
Status: DISCONTINUED | OUTPATIENT
Start: 2024-06-11 | End: 2024-06-11

## 2024-06-10 RX ORDER — NOREPINEPHRINE BITARTRATE 0.06 MG/ML
INJECTION, SOLUTION INTRAVENOUS
Status: COMPLETED
Start: 2024-06-10 | End: 2024-06-10

## 2024-06-10 RX ORDER — ACETAMINOPHEN 650 MG/1
650 SUPPOSITORY RECTAL EVERY 6 HOURS PRN
Status: DISCONTINUED | OUTPATIENT
Start: 2024-06-10 | End: 2024-06-11

## 2024-06-10 RX ORDER — ACETAMINOPHEN 325 MG/1
650 TABLET ORAL EVERY 6 HOURS PRN
Status: DISCONTINUED | OUTPATIENT
Start: 2024-06-10 | End: 2024-06-11

## 2024-06-10 RX ORDER — ALBUTEROL SULFATE 2.5 MG/3ML
2.5 SOLUTION RESPIRATORY (INHALATION)
Status: DISCONTINUED | OUTPATIENT
Start: 2024-06-10 | End: 2024-06-11

## 2024-06-10 RX ORDER — SODIUM CHLORIDE 0.9 % (FLUSH) 0.9 %
5-40 SYRINGE (ML) INJECTION EVERY 12 HOURS SCHEDULED
Status: DISCONTINUED | OUTPATIENT
Start: 2024-06-11 | End: 2024-06-11

## 2024-06-10 RX ORDER — HEPARIN SODIUM 1000 [USP'U]/ML
30 INJECTION, SOLUTION INTRAVENOUS; SUBCUTANEOUS PRN
Status: DISCONTINUED | OUTPATIENT
Start: 2024-06-10 | End: 2024-06-11

## 2024-06-10 RX ORDER — HEPARIN SODIUM 10000 [USP'U]/100ML
5-30 INJECTION, SOLUTION INTRAVENOUS CONTINUOUS
Status: DISCONTINUED | OUTPATIENT
Start: 2024-06-10 | End: 2024-06-11

## 2024-06-10 RX ORDER — POLYETHYLENE GLYCOL 3350 17 G/17G
17 POWDER, FOR SOLUTION ORAL DAILY PRN
Status: DISCONTINUED | OUTPATIENT
Start: 2024-06-10 | End: 2024-06-11

## 2024-06-10 RX ORDER — HEPARIN SODIUM 1000 [USP'U]/ML
60 INJECTION, SOLUTION INTRAVENOUS; SUBCUTANEOUS ONCE
Status: DISCONTINUED | OUTPATIENT
Start: 2024-06-10 | End: 2024-06-11

## 2024-06-10 RX ORDER — NOREPINEPHRINE BITARTRATE 0.06 MG/ML
1-100 INJECTION, SOLUTION INTRAVENOUS CONTINUOUS
Status: DISCONTINUED | OUTPATIENT
Start: 2024-06-10 | End: 2024-06-11

## 2024-06-10 RX ORDER — HEPARIN SODIUM 1000 [USP'U]/ML
60 INJECTION, SOLUTION INTRAVENOUS; SUBCUTANEOUS PRN
Status: DISCONTINUED | OUTPATIENT
Start: 2024-06-10 | End: 2024-06-11

## 2024-06-10 RX ORDER — PROPOFOL 10 MG/ML
INJECTION, EMULSION INTRAVENOUS
Status: COMPLETED
Start: 2024-06-10 | End: 2024-06-10

## 2024-06-10 RX ORDER — PROPOFOL 10 MG/ML
5-50 INJECTION, EMULSION INTRAVENOUS CONTINUOUS
Status: DISCONTINUED | OUTPATIENT
Start: 2024-06-10 | End: 2024-06-11

## 2024-06-10 RX ORDER — SODIUM CHLORIDE 9 MG/ML
INJECTION, SOLUTION INTRAVENOUS PRN
Status: DISCONTINUED | OUTPATIENT
Start: 2024-06-10 | End: 2024-06-11

## 2024-06-10 RX ORDER — SODIUM CHLORIDE 0.9 % (FLUSH) 0.9 %
5-40 SYRINGE (ML) INJECTION PRN
Status: DISCONTINUED | OUTPATIENT
Start: 2024-06-10 | End: 2024-06-11

## 2024-06-10 RX ORDER — 0.9 % SODIUM CHLORIDE 0.9 %
1000 INTRAVENOUS SOLUTION INTRAVENOUS ONCE
Status: COMPLETED | OUTPATIENT
Start: 2024-06-10 | End: 2024-06-11

## 2024-06-10 RX ORDER — SODIUM CHLORIDE, SODIUM LACTATE, POTASSIUM CHLORIDE, AND CALCIUM CHLORIDE .6; .31; .03; .02 G/100ML; G/100ML; G/100ML; G/100ML
1000 INJECTION, SOLUTION INTRAVENOUS ONCE
Status: DISCONTINUED | OUTPATIENT
Start: 2024-06-10 | End: 2024-06-10

## 2024-06-10 RX ORDER — ONDANSETRON 2 MG/ML
4 INJECTION INTRAMUSCULAR; INTRAVENOUS EVERY 6 HOURS PRN
Status: DISCONTINUED | OUTPATIENT
Start: 2024-06-10 | End: 2024-06-11 | Stop reason: HOSPADM

## 2024-06-10 RX ORDER — ONDANSETRON 4 MG/1
4 TABLET, ORALLY DISINTEGRATING ORAL EVERY 8 HOURS PRN
Status: DISCONTINUED | OUTPATIENT
Start: 2024-06-10 | End: 2024-06-11 | Stop reason: HOSPADM

## 2024-06-10 RX ADMIN — Medication 30 MCG/MIN: at 22:26

## 2024-06-10 RX ADMIN — PROPOFOL 20 MCG/KG/MIN: 10 INJECTION, EMULSION INTRAVENOUS at 22:49

## 2024-06-10 RX ADMIN — Medication 5 MCG/MIN: at 22:36

## 2024-06-10 RX ADMIN — SODIUM BICARBONATE: 84 INJECTION, SOLUTION INTRAVENOUS at 23:39

## 2024-06-10 RX ADMIN — SODIUM CHLORIDE 1000 ML: 9 INJECTION, SOLUTION INTRAVENOUS at 22:39

## 2024-06-10 RX ADMIN — AMIODARONE HYDROCHLORIDE 0.5 MG/MIN: 1.8 INJECTION, SOLUTION INTRAVENOUS at 22:34

## 2024-06-10 ASSESSMENT — PULMONARY FUNCTION TESTS: PIF_VALUE: 18

## 2024-06-11 ENCOUNTER — APPOINTMENT (OUTPATIENT)
Dept: CT IMAGING | Age: 69
DRG: 871 | End: 2024-06-11
Attending: INTERNAL MEDICINE
Payer: MEDICARE

## 2024-06-11 VITALS
BODY MASS INDEX: 16.03 KG/M2 | TEMPERATURE: 94.6 F | OXYGEN SATURATION: 53 % | DIASTOLIC BLOOD PRESSURE: 132 MMHG | WEIGHT: 96.34 LBS | SYSTOLIC BLOOD PRESSURE: 174 MMHG

## 2024-06-11 LAB
ACINETOBACTER CALCOACETICUS-BAUMANNII BY PCR: NOT DETECTED
ALBUMIN SERPL BCG-MCNC: 1.9 G/DL (ref 3.5–5.1)
ALBUMIN SERPL BCG-MCNC: 1.9 G/DL (ref 3.5–5.1)
ALP SERPL-CCNC: 34 U/L (ref 38–126)
ALP SERPL-CCNC: 50 U/L (ref 38–126)
ALT SERPL W/O P-5'-P-CCNC: 47 U/L (ref 11–66)
ALT SERPL W/O P-5'-P-CCNC: 670 U/L (ref 11–66)
AMORPH SED URNS QL MICRO: ABNORMAL
ANION GAP SERPL CALC-SCNC: 16 MEQ/L (ref 8–16)
ANION GAP SERPL CALC-SCNC: 21 MEQ/L (ref 8–16)
ANION GAP SERPL CALC-SCNC: 29 MEQ/L (ref 8–16)
APTT PPP: 31.1 SECONDS (ref 22–38)
ARTERIAL PATENCY WRIST A: ABNORMAL
AST SERPL-CCNC: 1030 U/L (ref 5–40)
AST SERPL-CCNC: 83 U/L (ref 5–40)
AUTO DIFF PNL BLD: ABNORMAL
BACTERIA URNS QL MICRO: ABNORMAL /HPF
BASE EXCESS BLDA CALC-SCNC: -19.8 MMOL/L (ref -2.5–2.5)
BASOPHILS ABSOLUTE: 0 THOU/MM3 (ref 0–0.1)
BASOPHILS ABSOLUTE: 0 THOU/MM3 (ref 0–0.1)
BASOPHILS NFR BLD AUTO: 0.5 %
BASOPHILS NFR BLD AUTO: 0.5 %
BDY SITE: ABNORMAL
BILIRUB SERPL-MCNC: 0.7 MG/DL (ref 0.3–1.2)
BILIRUB SERPL-MCNC: 1.4 MG/DL (ref 0.3–1.2)
BILIRUB UR QL STRIP.AUTO: ABNORMAL
BLACTX-M ISLT/SPM QL: NOT DETECTED
BLAIMP ISLT/SPM QL: NOT DETECTED
BLAKPC ISLT/SPM QL: NOT DETECTED
BLAOXA-48-LIKE ISLT/SPM QL: NOT DETECTED
BLAVIM ISLT/SPM QL: NOT DETECTED
BREATHS SETTING VENT: 12 BPM
BUN SERPL-MCNC: 73 MG/DL (ref 7–22)
BUN SERPL-MCNC: 75 MG/DL (ref 7–22)
BUN SERPL-MCNC: 77 MG/DL (ref 7–22)
BURR CELLS BLD QL SMEAR: ABNORMAL
BURR CELLS BLD QL SMEAR: ABNORMAL
C PNEUM DNA LOWER RESP QL NAA+NON-PROBE: NOT DETECTED
CA-I BLD ISE-SCNC: 1.04 MMOL/L (ref 1.12–1.32)
CALCIUM SERPL-MCNC: 8 MG/DL (ref 8.5–10.5)
CALCIUM SERPL-MCNC: 8.1 MG/DL (ref 8.5–10.5)
CALCIUM SERPL-MCNC: 8.3 MG/DL (ref 8.5–10.5)
CASTS #/AREA URNS LPF: ABNORMAL /LPF
CASTS 2: ABNORMAL /LPF
CHARACTER UR: ABNORMAL
CHLORIDE SERPL-SCNC: 110 MEQ/L (ref 98–111)
CHLORIDE SERPL-SCNC: 113 MEQ/L (ref 98–111)
CHLORIDE SERPL-SCNC: 113 MEQ/L (ref 98–111)
CK SERPL-CCNC: 3320 U/L (ref 30–135)
CLOT ANGLE BLD TEG: 18.6 MM (ref 15–32)
CLOT INIT BLD TEG: 2.8 MINUTES (ref 4.6–9.1)
CLOT LYSIS 30M P MA LENFR BLD TEG: 0 % (ref 0–2.6)
CO2 SERPL-SCNC: 11 MEQ/L (ref 23–33)
CO2 SERPL-SCNC: 14 MEQ/L (ref 23–33)
CO2 SERPL-SCNC: 7 MEQ/L (ref 23–33)
COLLECTED BY:: ABNORMAL
COLOR: YELLOW
CREAT SERPL-MCNC: 1.7 MG/DL (ref 0.4–1.2)
CREAT SERPL-MCNC: 1.8 MG/DL (ref 0.4–1.2)
CREAT SERPL-MCNC: 2.1 MG/DL (ref 0.4–1.2)
CRYSTALS URNS MICRO: ABNORMAL
CRYSTALS URNS MICRO: ABNORMAL
DEPRECATED RDW RBC AUTO: 48.6 FL (ref 35–45)
DEPRECATED RDW RBC AUTO: 53.1 FL (ref 35–45)
DEVICE: ABNORMAL
EKG ATRIAL RATE: 125 BPM
EKG P AXIS: 85 DEGREES
EKG P-R INTERVAL: 122 MS
EKG Q-T INTERVAL: 308 MS
EKG QRS DURATION: 78 MS
EKG QTC CALCULATION (BAZETT): 444 MS
EKG R AXIS: 83 DEGREES
EKG T AXIS: 78 DEGREES
EKG VENTRICULAR RATE: 125 BPM
ENTEROBACTER CLOACAE COMPLEX BY PCR: NOT DETECTED
EOSINOPHIL NFR BLD AUTO: 0 %
EOSINOPHIL NFR BLD AUTO: 0.5 %
EOSINOPHILS ABSOLUTE: 0 THOU/MM3 (ref 0–0.4)
EOSINOPHILS ABSOLUTE: 0 THOU/MM3 (ref 0–0.4)
EPITHELIAL CELLS, UA: ABNORMAL /HPF
ERYTHROCYTE [DISTWIDTH] IN BLOOD BY AUTOMATED COUNT: 13.5 % (ref 11.5–14.5)
ERYTHROCYTE [DISTWIDTH] IN BLOOD BY AUTOMATED COUNT: 13.7 % (ref 11.5–14.5)
ESCHERICHIA COLI BY PCR: NOT DETECTED
FIBRINOGEN PPP-MCNC: 387 MG/100ML (ref 155–475)
FIO2 ON VENT O2 ANALYZER: 80 %
FLUAV RNA LOWER RESP QL NAA+NON-PROBE: NOT DETECTED
FLUBV RNA LOWER RESP QL NAA+NON-PROBE: NOT DETECTED
GFR SERPL CREATININE-BSD FRML MDRD: 25 ML/MIN/1.73M2
GFR SERPL CREATININE-BSD FRML MDRD: 30 ML/MIN/1.73M2
GFR SERPL CREATININE-BSD FRML MDRD: 32 ML/MIN/1.73M2
GLUCOSE SERPL-MCNC: 106 MG/DL (ref 70–108)
GLUCOSE SERPL-MCNC: 69 MG/DL (ref 70–108)
GLUCOSE SERPL-MCNC: 85 MG/DL (ref 70–108)
GLUCOSE UR QL STRIP.AUTO: NEGATIVE MG/DL
HADV DNA LOWER RESP QL NAA+NON-PROBE: NOT DETECTED
HAEMOPHILUS INFLUENZAE BY PCR: NOT DETECTED
HCO3 BLDA-SCNC: 9 MMOL/L (ref 23–28)
HCOV RNA LOWER RESP QL NAA+NON-PROBE: NOT DETECTED
HCT VFR BLD AUTO: 30.1 % (ref 37–47)
HCT VFR BLD AUTO: 40.1 % (ref 37–47)
HEPARIN UNFRACTIONATED: < 0.04 U/ML (ref 0.3–0.7)
HGB BLD-MCNC: 12.6 GM/DL (ref 12–16)
HGB BLD-MCNC: 9.1 GM/DL (ref 12–16)
HGB UR QL STRIP.AUTO: ABNORMAL
HMPV RNA LOWER RESP QL NAA+NON-PROBE: NOT DETECTED
HPIV RNA LOWER RESP QL NAA+NON-PROBE: NOT DETECTED
IMM GRANULOCYTES # BLD AUTO: 0.01 THOU/MM3 (ref 0–0.07)
IMM GRANULOCYTES # BLD AUTO: 0.02 THOU/MM3 (ref 0–0.07)
IMM GRANULOCYTES NFR BLD AUTO: 0.5 %
IMM GRANULOCYTES NFR BLD AUTO: 1 %
INR PPP: 1.16 (ref 0.85–1.13)
KETONES UR QL STRIP.AUTO: NEGATIVE
KLEBSIELLA AEROGENES BY PCR: NOT DETECTED
KLEBSIELLA OXYTOCA BY PCR: DETECTED
KLEBSIELLA PNEUMONIAE GROUP BY PCR: DETECTED
L PNEUMO DNA LOWER RESP QL NAA+NON-PROBE: NOT DETECTED
LACTIC ACID, SEPSIS: 10.9 MMOL/L (ref 0.5–1.9)
LACTIC ACID, SEPSIS: 6.2 MMOL/L (ref 0.5–1.9)
LYMPHOCYTES ABSOLUTE: 0.6 THOU/MM3 (ref 1–4.8)
LYMPHOCYTES ABSOLUTE: 0.8 THOU/MM3 (ref 1–4.8)
LYMPHOCYTES NFR BLD AUTO: 32.3 %
LYMPHOCYTES NFR BLD AUTO: 39 %
M PNEUMO DNA LOWER RESP QL NAA+NON-PROBE: NOT DETECTED
MAGNESIUM SERPL-MCNC: 2.2 MG/DL (ref 1.6–2.4)
MCF.EXTRINSIC BLD ROTEM: 51.4 MM (ref 52–70)
MCH RBC QN AUTO: 30.9 PG (ref 26–33)
MCH RBC QN AUTO: 31.9 PG (ref 26–33)
MCHC RBC AUTO-ENTMCNC: 30.2 GM/DL (ref 32.2–35.5)
MCHC RBC AUTO-ENTMCNC: 31.4 GM/DL (ref 32.2–35.5)
MCV RBC AUTO: 105.6 FL (ref 81–99)
MCV RBC AUTO: 98.3 FL (ref 81–99)
MISCELLANEOUS 2: ABNORMAL
MISCELLANEOUS LAB TEST RESULT: ABNORMAL
MONOCYTES ABSOLUTE: 0.1 THOU/MM3 (ref 0.4–1.3)
MONOCYTES ABSOLUTE: 0.1 THOU/MM3 (ref 0.4–1.3)
MONOCYTES NFR BLD AUTO: 3.8 %
MONOCYTES NFR BLD AUTO: 5.5 %
MORAXELLA CATARRHALIS BY PCR: NOT DETECTED
MRSA DNA SPEC QL NAA+PROBE: NEGATIVE
MUCOUS THREADS URNS QL MICRO: ABNORMAL
NEUTROPHILS ABSOLUTE: 1.1 THOU/MM3 (ref 1.8–7.7)
NEUTROPHILS ABSOLUTE: 1.2 THOU/MM3 (ref 1.8–7.7)
NEUTROPHILS NFR BLD AUTO: 53.5 %
NEUTROPHILS NFR BLD AUTO: 62.9 %
NITRITE UR QL STRIP: NEGATIVE
NRBC BLD AUTO-RTO: 2 /100 WBC
NRBC BLD AUTO-RTO: 6 /100 WBC
PATHOLOGIST REVIEW: ABNORMAL
PCO2 BLDA: 34 MMHG (ref 35–45)
PEEP SETTING VENT: 10 MMHG
PH BLDA: 7.05 [PH] (ref 7.35–7.45)
PH UR STRIP.AUTO: 5.5 [PH] (ref 5–9)
PHOSPHATE SERPL-MCNC: 8.9 MG/DL (ref 2.4–4.7)
PIP: 14 CMH2O
PLATELET # BLD AUTO: 27 THOU/MM3 (ref 130–400)
PLATELET # BLD AUTO: 75 THOU/MM3 (ref 130–400)
PLATELET BLD QL SMEAR: ABNORMAL
PLATELET BLD QL SMEAR: ABNORMAL
PMV BLD AUTO: 13 FL (ref 9.4–12.4)
PMV BLD AUTO: ABNORMAL FL (ref 9.4–12.4)
PO2 BLDA: 301 MMHG (ref 71–104)
POLYCHROMASIA BLD QL SMEAR: ABNORMAL
POLYCHROMASIA BLD QL SMEAR: ABNORMAL
POTASSIUM SERPL-SCNC: 3.5 MEQ/L (ref 3.5–5.2)
POTASSIUM SERPL-SCNC: 4.3 MEQ/L (ref 3.5–5.2)
POTASSIUM SERPL-SCNC: 5 MEQ/L (ref 3.5–5.2)
PROT SERPL-MCNC: 3.4 G/DL (ref 6.1–8)
PROT SERPL-MCNC: 4.2 G/DL (ref 6.1–8)
PROT UR STRIP.AUTO-MCNC: 100 MG/DL
PROTEUS SPECIES BY PCR: NOT DETECTED
PSEUDOMONAS AERUGINOSA BY PCR: DETECTED
RBC # BLD AUTO: 2.85 MILL/MM3 (ref 4.2–5.4)
RBC # BLD AUTO: 4.08 MILL/MM3 (ref 4.2–5.4)
RBC URINE: ABNORMAL /HPF
RENAL EPI CELLS #/AREA URNS HPF: ABNORMAL /[HPF]
RESISTANT GENE MECA/C & MREJ BY PCR: ABNORMAL
RESISTANT GENE NDM BY PCR: NOT DETECTED
RSV RNA LOWER RESP QL NAA+NON-PROBE: NOT DETECTED
RV+EV RNA LOWER RESP QL NAA+NON-PROBE: NOT DETECTED
SAO2 % BLDA: 100 %
SCAN OF BLOOD SMEAR: NORMAL
SCAN OF BLOOD SMEAR: NORMAL
SERRATIA MARCESCENS BY PCR: NOT DETECTED
SODIUM SERPL-SCNC: 143 MEQ/L (ref 135–145)
SODIUM SERPL-SCNC: 145 MEQ/L (ref 135–145)
SODIUM SERPL-SCNC: 146 MEQ/L (ref 135–145)
SOURCE: ABNORMAL
SP GR UR REFRACT.AUTO: >= 1.03 (ref 1–1.03)
SPECIMEN ACCEPTABILITY: ABNORMAL
STAPH AUREUS BY PCR: NOT DETECTED
STREP AGALACTIAE BY PCR: NOT DETECTED
STREP PNEUMONIAE BY PCR: NOT DETECTED
STREP PYOGENES BY PCR: NOT DETECTED
UROBILINOGEN, URINE: 0.2 EU/DL (ref 0–1)
VENTILATION MODE VENT: AC
WBC # BLD AUTO: 1.9 THOU/MM3 (ref 4.8–10.8)
WBC # BLD AUTO: 2 THOU/MM3 (ref 4.8–10.8)
WBC #/AREA URNS HPF: ABNORMAL /HPF
WBC #/AREA URNS HPF: NEGATIVE /[HPF]
YEAST LIKE FUNGI URNS QL MICRO: ABNORMAL

## 2024-06-11 PROCEDURE — 93010 ELECTROCARDIOGRAM REPORT: CPT | Performed by: INTERNAL MEDICINE

## 2024-06-11 PROCEDURE — 84100 ASSAY OF PHOSPHORUS: CPT

## 2024-06-11 PROCEDURE — 70450 CT HEAD/BRAIN W/O DYE: CPT

## 2024-06-11 PROCEDURE — 74176 CT ABD & PELVIS W/O CONTRAST: CPT

## 2024-06-11 PROCEDURE — 82550 ASSAY OF CK (CPK): CPT

## 2024-06-11 PROCEDURE — 82803 BLOOD GASES ANY COMBINATION: CPT

## 2024-06-11 PROCEDURE — 04HY32Z INSERTION OF MONITORING DEVICE INTO LOWER ARTERY, PERCUTANEOUS APPROACH: ICD-10-PCS | Performed by: INTERNAL MEDICINE

## 2024-06-11 PROCEDURE — 80053 COMPREHEN METABOLIC PANEL: CPT

## 2024-06-11 PROCEDURE — 87040 BLOOD CULTURE FOR BACTERIA: CPT

## 2024-06-11 PROCEDURE — 87631 RESP VIRUS 3-5 TARGETS: CPT

## 2024-06-11 PROCEDURE — 6360000002 HC RX W HCPCS

## 2024-06-11 PROCEDURE — 2700000000 HC OXYGEN THERAPY PER DAY

## 2024-06-11 PROCEDURE — 83735 ASSAY OF MAGNESIUM: CPT

## 2024-06-11 PROCEDURE — 3E033XZ INTRODUCTION OF VASOPRESSOR INTO PERIPHERAL VEIN, PERCUTANEOUS APPROACH: ICD-10-PCS | Performed by: INTERNAL MEDICINE

## 2024-06-11 PROCEDURE — 81001 URINALYSIS AUTO W/SCOPE: CPT

## 2024-06-11 PROCEDURE — 87070 CULTURE OTHR SPECIMN AEROBIC: CPT

## 2024-06-11 PROCEDURE — 2580000003 HC RX 258

## 2024-06-11 PROCEDURE — 87205 SMEAR GRAM STAIN: CPT

## 2024-06-11 PROCEDURE — 36415 COLL VENOUS BLD VENIPUNCTURE: CPT

## 2024-06-11 PROCEDURE — 94761 N-INVAS EAR/PLS OXIMETRY MLT: CPT

## 2024-06-11 PROCEDURE — 93005 ELECTROCARDIOGRAM TRACING: CPT

## 2024-06-11 PROCEDURE — 87798 DETECT AGENT NOS DNA AMP: CPT

## 2024-06-11 PROCEDURE — 37799 UNLISTED PX VASCULAR SURGERY: CPT

## 2024-06-11 PROCEDURE — 83605 ASSAY OF LACTIC ACID: CPT

## 2024-06-11 PROCEDURE — 87077 CULTURE AEROBIC IDENTIFY: CPT

## 2024-06-11 PROCEDURE — 87150 DNA/RNA AMPLIFIED PROBE: CPT

## 2024-06-11 PROCEDURE — 82330 ASSAY OF CALCIUM: CPT

## 2024-06-11 PROCEDURE — 87486 CHLMYD PNEUM DNA AMP PROBE: CPT

## 2024-06-11 PROCEDURE — 87541 LEGION PNEUMO DNA AMP PROB: CPT

## 2024-06-11 PROCEDURE — 94003 VENT MGMT INPAT SUBQ DAY: CPT

## 2024-06-11 PROCEDURE — 5A1935Z RESPIRATORY VENTILATION, LESS THAN 24 CONSECUTIVE HOURS: ICD-10-PCS | Performed by: INTERNAL MEDICINE

## 2024-06-11 PROCEDURE — 85025 COMPLETE CBC W/AUTO DIFF WBC: CPT

## 2024-06-11 PROCEDURE — 2500000003 HC RX 250 WO HCPCS

## 2024-06-11 PROCEDURE — 71250 CT THORAX DX C-: CPT

## 2024-06-11 PROCEDURE — 87581 M.PNEUMON DNA AMP PROBE: CPT

## 2024-06-11 PROCEDURE — 87086 URINE CULTURE/COLONY COUNT: CPT

## 2024-06-11 PROCEDURE — P9047 ALBUMIN (HUMAN), 25%, 50ML: HCPCS

## 2024-06-11 RX ORDER — ALBUMIN (HUMAN) 12.5 G/50ML
25 SOLUTION INTRAVENOUS ONCE
Status: COMPLETED | OUTPATIENT
Start: 2024-06-11 | End: 2024-06-11

## 2024-06-11 RX ORDER — HEPARIN SODIUM 10000 [USP'U]/100ML
5-30 INJECTION, SOLUTION INTRAVENOUS CONTINUOUS
Status: DISCONTINUED | OUTPATIENT
Start: 2024-06-11 | End: 2024-06-11

## 2024-06-11 RX ORDER — HEPARIN SODIUM 1000 [USP'U]/ML
80 INJECTION, SOLUTION INTRAVENOUS; SUBCUTANEOUS ONCE
Status: COMPLETED | OUTPATIENT
Start: 2024-06-11 | End: 2024-06-11

## 2024-06-11 RX ORDER — CLOPIDOGREL BISULFATE 75 MG/1
75 TABLET ORAL DAILY
Status: DISCONTINUED | OUTPATIENT
Start: 2024-06-11 | End: 2024-06-11

## 2024-06-11 RX ORDER — ATORVASTATIN CALCIUM 40 MG/1
40 TABLET, FILM COATED ORAL NIGHTLY
Status: DISCONTINUED | OUTPATIENT
Start: 2024-06-11 | End: 2024-06-11 | Stop reason: HOSPADM

## 2024-06-11 RX ORDER — ASPIRIN 81 MG/1
81 TABLET, CHEWABLE ORAL DAILY
Status: DISCONTINUED | OUTPATIENT
Start: 2024-06-11 | End: 2024-06-11

## 2024-06-11 RX ORDER — HEPARIN SODIUM 1000 [USP'U]/ML
80 INJECTION, SOLUTION INTRAVENOUS; SUBCUTANEOUS PRN
Status: DISCONTINUED | OUTPATIENT
Start: 2024-06-11 | End: 2024-06-11

## 2024-06-11 RX ORDER — ACETAMINOPHEN 160 MG/5ML
650 LIQUID ORAL EVERY 4 HOURS PRN
Status: DISCONTINUED | OUTPATIENT
Start: 2024-06-11 | End: 2024-06-11 | Stop reason: HOSPADM

## 2024-06-11 RX ORDER — LORAZEPAM 2 MG/ML
1 INJECTION INTRAMUSCULAR EVERY 6 HOURS PRN
Status: DISCONTINUED | OUTPATIENT
Start: 2024-06-11 | End: 2024-06-11 | Stop reason: HOSPADM

## 2024-06-11 RX ORDER — MORPHINE SULFATE 2 MG/ML
2 INJECTION, SOLUTION INTRAMUSCULAR; INTRAVENOUS
Status: DISCONTINUED | OUTPATIENT
Start: 2024-06-11 | End: 2024-06-11 | Stop reason: HOSPADM

## 2024-06-11 RX ORDER — HEPARIN SODIUM 1000 [USP'U]/ML
40 INJECTION, SOLUTION INTRAVENOUS; SUBCUTANEOUS PRN
Status: DISCONTINUED | OUTPATIENT
Start: 2024-06-11 | End: 2024-06-11

## 2024-06-11 RX ORDER — MORPHINE SULFATE 4 MG/ML
4 INJECTION, SOLUTION INTRAMUSCULAR; INTRAVENOUS
Status: DISCONTINUED | OUTPATIENT
Start: 2024-06-11 | End: 2024-06-11 | Stop reason: HOSPADM

## 2024-06-11 RX ADMIN — HEPARIN SODIUM AND DEXTROSE 18 UNITS/KG/HR: 10000; 5 INJECTION INTRAVENOUS at 02:15

## 2024-06-11 RX ADMIN — SODIUM CHLORIDE, POTASSIUM CHLORIDE, SODIUM LACTATE AND CALCIUM CHLORIDE: 600; 310; 30; 20 INJECTION, SOLUTION INTRAVENOUS at 00:13

## 2024-06-11 RX ADMIN — HEPARIN SODIUM 3500 UNITS: 1000 INJECTION INTRAVENOUS; SUBCUTANEOUS at 02:10

## 2024-06-11 RX ADMIN — VASOPRESSIN 0.03 UNITS/MIN: 20 INJECTION INTRAVENOUS at 02:59

## 2024-06-11 RX ADMIN — Medication 80 MCG/MIN: at 07:47

## 2024-06-11 RX ADMIN — PIPERACILLIN AND TAZOBACTAM 4500 MG: 4; .5 INJECTION, POWDER, LYOPHILIZED, FOR SOLUTION INTRAVENOUS at 03:12

## 2024-06-11 RX ADMIN — Medication 60 MCG/MIN: at 03:29

## 2024-06-11 RX ADMIN — ALBUMIN (HUMAN) 25 G: 0.25 INJECTION, SOLUTION INTRAVENOUS at 03:28

## 2024-06-11 ASSESSMENT — PULMONARY FUNCTION TESTS
PIF_VALUE: 25
PIF_VALUE: 22
PIF_VALUE: 23

## 2024-06-11 NOTE — PROCEDURES
PROCEDURE NOTE  Date: 6/11/2024   Name: Alpa Marte  YOB: 1955    Procedures  12 lead EKG completed. Results handed to Abimael RAMIREZ.

## 2024-06-11 NOTE — H&P
CRITICAL CARE- History & Physical      Patient: Alpa Marte    Unit/Bed:4D-11/011-A  YOB: 1955  MRN: [id number]   Acct: [id number]   PCP: Connie Augustin PA    Date of Service: Pt seen/examined on 06/10/24  and Admitted to Inpatient with expected LOS greater than two midnights due to medical therapy.     Chief Complaint:  Found unresponsive, per family for 2 days.     Assessment and Plan:-    #Acute Hypoxic Respiratory Failure 2/2 Cardiogenic Shock: Active  Baseline O2 Requirement: room air. ABG  06/10/24 : 7.25 / 34 / 524 / 15 on FiO2 100.   Plan:   -Wean O2 supplementation as tolerated maintaining O2 saturation >90%  -Continue lung protective strategies    +Ppeak ?35 cmH20    +Pplateau ?30 cmH20    +Vt >6-30% cc/kg IBW  to <8+30% cc/kg IBW     #SVT / VT: Active  Per sign out from [place], pt had SVT / VT and was cardioverted then started on amiodarone gtt  Plan:   -Recommend cardiology consultation in AM  -Continuous telemetry  -K >4. Mg >2    #Undifferentiated Shock: Active  Pt found down at home. Bilateral upper extremity digits are ischemic. Presented from [facility] on levophed and epinephrine. Lactate 6.9 at OSH. On arrival, per ABG, 5.93. At this time, differential includes distributive shock vs cardiogenic shock. Pt is cold and dry, therefore, I suspect cardiogenic shock but cannot exclude septic shock.   Plan:   -Wean levophed / epinephrine maintaining MAP > 65 mmHg  -TTE    #Ischemic Digits: Active  Bilateral Upper Extremity Ischemic Digits. Worsening. Suspected arterial thrombosis. Contacted Dr. Umesh Fernandez MD, IR. He states given patient's recent ICH, she is not a candidate for any thrombolytics.   Plan:   -Heparin gtt  -Will consider cardiology vs vascular consult in AM  -Poor prognostic indicator    #Acute Ischemic Stroke of the R Frontoparietal Region:Active  Last known well: per family, 06/08/24, however they are inebriated and unreliable. S/s: unresponsive. Initial NIHSS:    [] Left IJ [] Right Femoral [] Left Femoral     [] Right Subclavian [] Left Subclavian  [] Peripheral IV access  [] Arterial Line (Specify Site)    BAILEY CATHETER:-   [] No  [] Yes  (Date  )     ICU PROPHYLAXIS/THERAPY:   Stress ulcer:  [] PPI Agent  [] H2RA [] Sucralfate [] Other:   VTE:     [] Enoxaparin    [] Warfarin [] NOAC [] PCD Device:Bilat LE   [] Heparin: [] Subcut / [] IV     LABS/RADIOLOGY:-  No results for input(s): \"WBC\", \"HGB\", \"HCT\", \"PLT\" in the last 72 hours.  No results for input(s): \"NA\", \"K\", \"CL\", \"CO2\", \"BUN\", \"CREATININE\", \"CALCIUM\", \"PHOS\" in the last 72 hours.    Invalid input(s): \"MAGNES\"  No results for input(s): \"AST\", \"ALT\", \"BILIDIR\", \"BILITOT\", \"ALKPHOS\" in the last 72 hours.  No results for input(s): \"INR\" in the last 72 hours.  No results for input(s): \"CKTOTAL\", \"TROPONINI\" in the last 72 hours.  No results for input(s): \"PROCAL\" in the last 72 hours.  No results for input(s): \"LACTA\" in the last 72 hours.   Microbiology:    Blood culture #1:   Lab Results   Component Value Date/Time    BC  04/08/2024 02:30 PM     No growth 24 hours. No growth 48 hours. No growth at 5 days    BC  04/08/2024 02:30 PM     No growth 24 hours. No growth 48 hours. No growth at 5 days     Blood culture #2:No results found for: \"BLOODCULT2\"  Organism:  Lab Results   Component Value Date/Time    ORG Escherichia coli 04/04/2024 08:50 AM         Lab Results   Component Value Date/Time    LABGRAM  04/11/2024 09:59 AM     Many segmented neutrophils observed. No epithelial cells observed. No bacteria seen.     MRSA culture only:No results found for: \"MRSAC\"  Urine culture: No results found for: \"LABURIN\"  Respiratory culture: No results found for: \"CULTRESP\"  Aerobic and Anaerobic :  Lab Results   Component Value Date/Time    LABAERO No growth-preliminary No growth 04/11/2024 09:59 AM     Lab Results   Component Value Date/Time    LABANAE No growth-preliminary No growth 04/11/2024 09:59 AM       Urinalysis:

## 2024-06-11 NOTE — PROGRESS NOTES
Loop Referral made at this time: [phone]    On hold at this time. Not to withdraw or discontinue care until loop calls back.

## 2024-06-11 NOTE — PROGRESS NOTES
[facility]   Pharmacy Pharmacokinetic Monitoring Service - Vancomycin     Alpa Marte is a 69 y.o. female starting on vancomycin therapy for sepsis. Pharmacy consulted by Dr GEENA Corral for monitoring and adjustment.    Target Concentration: Dosing based on anticipated concentration < 15 mg/L due to renal impairment/insufficiency    Additional Antimicrobials: Zosyn    Pertinent Laboratory Values:   Wt Readings from Last 1 Encounters:   06/10/24 43.7 kg (96 lb 5.5 oz)     Temp Readings from Last 1 Encounters:   04/19/24 98 °F (36.7 °C) (Oral)     CrCl cannot be calculated (Patient's most recent lab result is older than the maximum 30 days allowed.).  No results for input(s): \"CREATININE\", \"BUN\", \"WBC\" in the last 72 hours.  Pertinent Cultures:  Date Source Results   6/10/24 Urine    6/10/24 MRSA    MRSA Nasal Swab: was ordered by provider, awaiting results.    Plan:  Concentration-guided dosing due to renal impairment/insufficiency  Start vancomycin 1000mg given before transfer to [facility] at ~ 2100 6/10  Renal labs as indicated   Vancomycin concentration ordered for 6/11 @ 1800 (before microbiology does calibration on machine)   Pharmacy will continue to monitor patient and adjust therapy as indicated    Thank you for the consult,  Manasa Caban RPh, BCPS, KRISTINP  6/10/2024     11:10 PM

## 2024-06-11 NOTE — DEATH NOTES
[facility]  Notice of Patient Passing      Patient Name- Alpa Marte   Acct Number- [id number]   Attending Physician- Brannon Castaneda MD    Admitted on-6/10/2024 10:15 PM     On 6/11/2024 at 0855 patient was found in 4D11 with:   Absence of vital signs.   Absence of neurological response.    Confirmed time of death at 0855.   Physician or On-call Physician notified of time of death- yes    Family present at time of death- yes, Brother   Spiritual care present at time of death- no    Physician was notified and orders were obtained to release the body.   Post-Mortem documentation completed; form printed, signed, and given to admitting.    Brandie Orellana RN RN Nursing Supervisor/ Manager  6/11/24   9:25 AM    ________________________________________________________________________    Complete information below if 's Case only:    Death Notification    Reported ________________   [facility]’s Office  ’s Case __________   Internal Use Only   Autopsy       Y ____    N____  ’s & Investigator’s Notes  Agency    ________________  FAX:  [phone]   Agency #   _______________     Call Date: [id number]   Call Time: 0914  Notified by: SYD Larson-RN   Of: [facility]    Type of Death:   [x] Notification  []Hospice  [x]Hospital < 24 Hour  [] ED Death  []Home  []Nursing Home      Pt Name: Alpa Marte   Address: [address] [place] [address]  [facility] [address]  Phone: [phone] (home)    SSN:     MRN: [id number]    AGE: 69 y.o.   YOB: 1955       GENDER: female     Primary Care Physician: Connie Augustin PA   Attending Physician Name: Dr. Castaneda    Date of Death: 06/11/24  Time of Death: 0855  Pronounced By: BRIAN LarsonN-RN, House Supervisor      Emergency Contact:   Name of Family Notified of Death: Ja Montoya   Relationship to Patient: Brother   Phone Number: [phone]  Address of Next  of Kin:     Cause of Death: Septic Shock    Co-Morbidities:  Patient Active Problem List   Diagnosis    Status post lumbar spinal fusion    Moderate malnutrition (HCC)    Transient alteration of awareness    Intracranial bleeding (HCC)    Cerebellar hemorrhage (HCC)    Severe malnutrition (HCC)    Meningitis    Post-op pain    Acute respiratory failure (HCC)    Unresponsive       Comments:     Name of  Home: [facility]  [facility]   Home Phone Number: [phone]    Who Will Sign Death Certificate:     [x] Dr. Saucedo

## 2024-06-11 NOTE — DISCHARGE SUMMARY
CRITICAL CARE DEATH NOTE      Patient:  Alpa Marte    Unit/Bed:4D-11/011-A  YOB: 1955  MRN: [id number]   PCP: Connie Augustin PA  Date of Admission: 6/10/2024  Chief Complaint:- found unresponsive by family for 2 days  Cause of death: septic shock  Date of death: 6/11/2024  Time of death: 0859    Assessment and Plan:    Acute hypoxic respiratory failure: worsening ABG. Initial ABG 7.25/34/524/15, repeat 7.05/34/301/9.  SVT:  Cardioverted and started on amiodarone gtt prior to transfer.  Septic shock, pulmonary etiology suspected: associated pancytopenia  Ischemic digits in bilateral upper extremities:  Concern for arterial thrombosis. Consult placed to Dr. Fernandez, notes that patient note a candidate for thrombolytics.  Acute ischemic stroke of right frontoparietal region:  LKW 6/8/2024 per family report. Patient unresponsive at time of admission to ICU. CT head w/o contrast noting right frontoparietal region.  SANTY:  Baseline creatine 0.3. Creatinine on admission 1.7.  Hx of lumbar fusion from L3 to pelvis:  Done by Dr. Caldera, most recent procedure done 3/26/2024. 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  Hx of hemorrhage to left cerebellar hemisphere: noted on MRI from 3/31/2024.  Malnutrition:  BMI 16.03    INITIAL H AND P AND ICU COURSE:  Patient was a 69-year-old female, medical history of HTN, restless leg syndrome, anxiety/depression, arthritis. Patient was previously seen by Dr. 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 Patient admitted to ICU on 3/28, transferred to stepdown, and then [facility] prior to finally being discharged home. Patient was found down at home unresponsive, LKW was two days prior to admission. Patient admitted to ICU given septic shock requiring multiple pressors for stabilization.    Given worsening condition, Dr. Corral, overnight ICU resident, made several attempts made to reach out to family to discuss

## 2024-06-11 NOTE — PROGRESS NOTES
Dr Uday Moore. 68 yo found down at home, LKW 2 days ago. Unresponsive, withdraws to pain only. Pan scan results pending, was here for head bleed 3/26-4/24. Intubated, on levo and amiodarone, fluids. On vent fiO2 100%,rate 12  peep10, ABG Ph 7.35 PCO2 30 PO2 437, they are decreasing fiO2. Pt breathing above vent . EKG sinus tach, episodes of afib rvr and vtach. K+ 7.7, treated, sod 136, Mg 3.1, Lactic 69, troponin 2.2, wbc 5, hgb 13.6, ammonia 202, etoh negative, drug screen marijuana only. Pressure necrosis to face, toes,R arm and fingers. Family unavailable to discuss code status, prior was full code. Vitals T 92, bear hugger in place, 178/70, 114, 28, 100% sat.

## 2024-06-11 NOTE — SIGNIFICANT EVENT
Mr. Ja Estevez (PoA) called back around 0630. I informed him of the severity of patient's critical illness. I informed him of her ischemic digits, new stroke, respiratory failure, renal failure, sepsis, shock, pancytopenia, and severe protein calorie malnutrition. I relayed that pt's prognosis was very poor and abysmal. Ja stated that patient would not want to \"live like a vegetable.\" She would also \"not want to live without her hands.\"     Ja decided to change patient's CODE STATUS. Ja asked if we could consult hospice because his father and mother had both received this service and he wanted it for her sister as well.     Resuscitation/Code Status Note on Alpa Marte (YOB: 1955)    At 0630 on June 11, 2024, resuscitation/code status decision was based on a thorough discussion with the patient's healthcare power of  - Ja Estevez .  The code status was made DNR-CC No additional code details.    Loop was contacted; will await their recommendations.    Signed:  Dr. Samir Corral MD  Internal Medicine, PGY-3, Chief Resident  06/11/24  7:07 AM    Staff: Brannon Michel MD

## 2024-06-11 NOTE — PROGRESS NOTES
Patient Weaning Progress    The patient's vent settings was not able to be weaned this shift.    Spontaneous weaning trial was not attempted due to defined parameters for SBT (spontaneous breathing trial) not being met.    Reason that defined ventilator parameters for SBT was not met              [x] Patient condition requires increased ventilator settings  [] Requires increased sedation   [x] Settings not within weaning range   [] SAT not completed   [] Physician orders    Evac tube was not hooked up with continuous low suction (20-30mmHg).    Cuff was not deflated to determine cuff leak.    Unable to get agreement for goals because no family is present and patient cannot respond.

## 2024-06-11 NOTE — PROGRESS NOTES
2217: Pt arrived to 4D11, 2220: Dr. Samir Corral at bedside   -anaya exchanged per Dr. Corral- urine culture sent.     2235: Levo increased to 50 per Dr. Samir Corral- All titrations outside of guidelines titrated per verbal order- see MAR for details.    2250: 35.8 temp- warming blanket applied    2250: Dr. Samir Corral at bedside to place art line.    -2258 successful placement- /66 , ABGs being drawn.

## 2024-06-11 NOTE — PROGRESS NOTES
0230 Dr. Samir Corral at bedside. Increased blood pressure support. Order for 1000ml LR bolus.    0243  Alerted provider of CK results. Order for albumin

## 2024-06-11 NOTE — SIGNIFICANT EVENT
Attempted to contact the family all night. No one would  the phone. Per [facility], they are all inebriated.     Pt will remain full code at this time. She is very very sick. She is at high risk of sustaining cardiac arrest. Very poor abysmal prognosis.     Electronically signed by Samir Corral MD on 6/11/2024 at 3:35 AM

## 2024-06-11 NOTE — CARE COORDINATION
06/11/24 0714   Service Assessment   Patient Orientation Sedated  (on vent)   Cognition Other (see comment)  (Sedated on vent)   History Provided By Other (see comment)  (Not appropriate at this time; family has decided to transition to DNRCC and withdraw life support)   Primary Caregiver Other (Comment)  (MARY)   Accompanied By/Relationship n/a   Support Systems Family Members   Patient's Healthcare Decision Maker is: Legal Next of Kin   PCP Verified by CM No  (MARY;Not appropriate at this time; family has decided to transition to DNRCC and withdraw life support)   Prior Functional Level Other (see comment)  (MARY;Not appropriate at this time; family has decided to transition to DNRCC and withdraw life support)   Current Functional Level Other (see comment)  (MARY; sedated on vent)   Can patient return to prior living arrangement No   Ability to make needs known: Unable   Family able to assist with home care needs: Other (comment)  (MARY;Not appropriate at this time; family has decided to transition to DNRCC and withdraw life support)   Would you like for me to discuss the discharge plan with any other family members/significant others, and if so, who? No   Financial Resources Medicaid;Medicare   Community Resources Other (Comment)  (MARY;Not appropriate at this time; family has decided to transition to DNRCC and withdraw life support)   Social/Functional History   Active  No  (MARY;Not appropriate at this time; family has decided to transition to DNRCC and withdraw life support)   Discharge Planning   Type of Residence Trailer/Mobile Home   Living Arrangements Alone   Current Services Prior To Admission Other (Comment)  (MARY;Not appropriate at this time; family has decided to transition to DNRCC and withdraw life support)   Potential Assistance Needed N/A   DME Ordered? No   Potential Assistance Purchasing Medications No   Type of Home Care Services None   Patient expects to be discharged to: Unknown   Services  At/After Discharge   Confirm Follow Up Transport Other (see comment)  (MARY;Not appropriate at this time; family has decided to transition to DNRCC and withdraw life support)   Condition of Participation: Discharge Planning   The Plan for Transition of Care is related to the following treatment goals: Keep patient comfortable     Patient Goals/Plan/Treatment Preferences: From home alone. Not appropriate for meet & greet at this time. Patient has been transitioned to DNRCC. Plan for withdrawal of life support. Hospice consulted.     If patient is discharged prior to next notation, then this note serves as note for discharge by case management.

## 2024-06-11 NOTE — PROGRESS NOTES
LOOP signed off, OK to proceed with hospice. They will reevaluate at cardiac time of death for tissue and eye.

## 2024-06-11 NOTE — PROGRESS NOTES
0830 - patient's brother and sister-in-law at the bedside. All questions and concerns addressed.    0840 - Father Jeovany at bedside anointing the patient    0844 - all continuous infusions stopped per family request and verbal order from Dr. Saini.    0845 - patient extubated per comfort care orders and family request.     0855 - Patient pulseless, with no heart tones or spontaneous breathing noted. Confirmed by Gina Corbett RN    0930 - Post mortem care completed.     1040 - patient transported to [facility] with transport at this time.     1043 - patient's ring found in a cup with a label on it. Sent with house supervisor to place on patient in the [facility].

## 2024-06-11 NOTE — PROGRESS NOTES
Patient arrived to unit from [place] via [facility]. Patient transferred to ICU bed and placed on continuous ICU bedside monitor. Patient admitted for Acute respiratory failure (HCC) [J96.00]. Vitals obtained. See flowsheets. Patient's IV access includes 22LAC, right groin CVC, left tibial IO. Current infusions and rates of infusion include Levo at 30mcg/min, Epi at 5mg, Amio at 0.5mg/min. Assessment completed by JAMES Varghese. Two nurse skin assessment completed by JAMES Varghese and JAMES Driver. See flowsheets for assessment details. Policies and procedures of ICU unable to be explained to patient at this time. Family member(s)/representative(s) present at time of admission include na. Patient rights explained to family member(s)/representatives and patient, as able. Patient/patient's family member(s)/representative(s) N/A to have physician notified of their admission. All questions posed by patient's family member(s)/representative(s) and patient answered at this time.

## 2024-06-11 NOTE — PROGRESS NOTES
Pharmacy Consult      Alpa Marte is a 69 y.o. female for whom pharmacy has been consulted to dose Zosyn.    Patient Active Problem List   Diagnosis    Status post lumbar spinal fusion    Moderate malnutrition (HCC)    Transient alteration of awareness    Intracranial bleeding (HCC)    Cerebellar hemorrhage (HCC)    Severe malnutrition (HCC)    Meningitis    Post-op pain    Acute respiratory failure (HCC)     Allergies:  Patient has no known allergies.     No results for input(s): \"CREATININE\" in the last 72 hours.  Ht/Wt:   Ht Readings from Last 1 Encounters:   05/03/24 1.651 m (5' 5\")        Wt Readings from Last 1 Encounters:   06/10/24 43.7 kg (96 lb 5.5 oz)       CrCl cannot be calculated (Patient's most recent lab result is older than the maximum 30 days allowed.).    Assessment/Plan:  Patient had 3375mg Zosyn at 2030 before transfer - will give 4500mg x 1 at 0230 and begin 3375mg q8h extended infusion as per protocol.  Calculated CrCl of 23 based on SCr from outside hospital.      Thank you for the consult.    Manasa Caban [facility], BCPS, BCGP  6/10/2024     11:03 PM

## 2024-06-11 NOTE — PROCEDURES
PROCEDURE NOTE  Date: 6/11/2024   Name: Alpa Marte  YOB: 1955    Insert Arterial Line    Date/Time: 6/11/2024 1:15 AM    Performed by: Samir Corral MD  Authorized by: Samir Corral MD  Consent: The procedure was performed in an emergent situation. Verbal consent not obtained. Written consent not obtained.  Required items: required blood products, implants, devices, and special equipment available  Patient identity confirmed: arm band, provided demographic data and hospital-assigned identification number  Time out: Immediately prior to procedure a \"time out\" was called to verify the correct patient, procedure, equipment, support staff and site/side marked as required.  Preparation: Patient was prepped and draped in the usual sterile fashion.  Indications: multiple ABGs, respiratory failure and hemodynamic monitoring  Location: left femoral  Anesthesia: see MAR for details    Sedation:  Patient sedated: yes  Sedatives: propofol  Analgesia: see MAR for details  Vitals: Vital signs were monitored during sedation.    Needle gauge: 21.  Seldinger technique: Seldinger technique used  Number of attempts: 1  Post-procedure: line sutured and dressing applied  Post-procedure CMS: normal and unchanged  Patient tolerance: patient tolerated the procedure well with no immediate complications

## 2024-06-12 LAB — BACTERIA UR CULT: NORMAL

## 2024-06-13 LAB
BACTERIA SPEC RESP CULT: ABNORMAL
BACTERIA UR CULT: NORMAL
GRAM STN SPEC: ABNORMAL
ORGANISM: ABNORMAL
ORGANISM: ABNORMAL

## 2024-06-16 LAB — BACTERIA BLD AEROBE CULT: NORMAL

## (undated) PROCEDURE — 009U3ZX DRAINAGE OF SPINAL CANAL, PERCUTANEOUS APPROACH, DIAGNOSTIC: ICD-10-PCS

## (undated) DEVICE — SUTURE VICRYL + SZ 2-0 L27IN ABSRB UD CP-1 1/2 CIR REV CUT VCP266H

## (undated) DEVICE — PAD OP RM W20XL72XH2IN PRECIS CUT FLAT EC BIOCLINIC

## (undated) DEVICE — CARBIDE MATCH HEAD

## (undated) DEVICE — INSTRUMENT BATTERY

## (undated) DEVICE — STERILE-Z SURGICAL PATIENT COVERS CLEAR POLYETHYLENE STERILE UNIVERSAL FIT 10 PER CASE: Brand: STERILE-Z

## (undated) DEVICE — CODMAN® SURGICAL PATTIES 1" X 1" (2.54CM X 2.54CM): Brand: CODMAN®

## (undated) DEVICE — OIL CARTRIDGE: Brand: CORE, MAESTRO

## (undated) DEVICE — GOWN,SIRUS,NON REINFRCD,LARGE,SET IN SL: Brand: MEDLINE

## (undated) DEVICE — DRAPE,INSTRUMENT,MAGNETIC,10X16: Brand: MEDLINE

## (undated) DEVICE — 2-TIER BTC,12/CS: Brand: MEDLINE

## (undated) DEVICE — BAG,BANDED,W/RUBBERBAND,STERILE,30X36: Brand: MEDLINE

## (undated) DEVICE — BIPOLAR SEALER 23-112-1 AQM 6.0: Brand: AQUAMANTYS ®

## (undated) DEVICE — KIT EVAC 400CC PVC RADPQ Y CONN

## (undated) DEVICE — BUR RND FLUT AGRSV 5MM

## (undated) DEVICE — SUTURE NRLN SZ 4-0 L18IN NONABSORBABLE BLK L17MM RB-1 1/2 C554D

## (undated) DEVICE — C-ARMOR C-ARM EQUIPMENT COVERS CLEAR STERILE UNIVERSAL FIT 12 PER CASE: Brand: C-ARMOR

## (undated) DEVICE — PROBE STIM 3 MM FOR PEDCL SCREW DISP

## (undated) DEVICE — SET EPI 18GA L3.5IN TUOHY NDL W/ 20GA CLS TIP NYL CATH

## (undated) DEVICE — SUTURE VICRYL + 1 L27IN ABSRB UD CT-1 L36MM 1/2 CIR TAPR PNT VCP261H

## (undated) DEVICE — DIFFUSER: Brand: CORE, MAESTRO

## (undated) DEVICE — AGENT HEMOSTATIC SURGIFLOW MATRIX KIT W/THROMBIN

## (undated) DEVICE — DRESSING ANITMICROBIAL FOAM OPTIFOAM POSTOP STRP 35 X 10 IN

## (undated) DEVICE — FAN SPRAY KIT: Brand: PULSAVAC®

## (undated) DEVICE — CATHETER IV 14GA L1.75IN OD2.146MM ID1.740MM ORNG VIALON

## (undated) DEVICE — PACK-MAJOR

## (undated) DEVICE — Device

## (undated) DEVICE — PREMIUM DRY TRAY LF: Brand: MEDLINE INDUSTRIES, INC.

## (undated) DEVICE — DRAPE MICROSCOPE 54 IN X 120 IN

## (undated) DEVICE — SPK10281 JACKSON TABLE KIT: Brand: SPK10281 JACKSON TABLE KIT

## (undated) DEVICE — BAND RUBBER ST

## (undated) DEVICE — 6.0MM ROUND FLUTED SOFT TOUCH

## (undated) DEVICE — GLOVE ORANGE PI 8   MSG9080

## (undated) DEVICE — GOWN,SIRUS,NONRNF,SETINSLV,XL,20/CS: Brand: MEDLINE

## (undated) DEVICE — SUTURE ETHILON SZ 3-0 L18IN NONABSORBABLE BLK L24MM FS-1 3/8 663G

## (undated) DEVICE — SEALANT TISS 4ML FIBRIN PREFIL SYR PRIMA FRZN W/ 1 DUPLOJET

## (undated) DEVICE — DRESSING ANTIMIC W3.5XL14IN IONIC SIL FOAM POSTOP STRP

## (undated) DEVICE — DISPOSABLE STANDARD BIPOLAR FORCEPS, NON-STICK,: Brand: SPETZLER-MALIS

## (undated) DEVICE — 7" HEIGHT PRONE HEADREST. WITH COMFORT FOAM AND RIGHT SIDE INTUBATION SLOT: Brand: SOULE MEDICAL

## (undated) DEVICE — DRAIN SURG 10FR PVC TB W/ TRCR MID PERF NO RESVR HUBLESS

## (undated) DEVICE — SOLUTION SCRB 4OZ 7.5% POVIDONE IOD ANTIMIC BTL

## (undated) DEVICE — BLADE ES L6IN ELASTOMERIC COAT EXT DURABLE BEND UPTO 90DEG

## (undated) DEVICE — 3M™ TEGADERM™ CHG CHLORHEXIDINE GLUCONATE I.V. PORT DRESSING STERILE U.S. ONLY 25/CARTON 4 CARTONS/CASE 1665: Brand: TEGADERM™

## (undated) DEVICE — DR SALMA'S SPINE: Brand: MEDLINE INDUSTRIES, INC.

## (undated) DEVICE — SOLUTION PREP PAINT POV IOD FOR SKIN MUCOUS MEM